# Patient Record
Sex: FEMALE | Race: OTHER | NOT HISPANIC OR LATINO | Employment: PART TIME | URBAN - METROPOLITAN AREA
[De-identification: names, ages, dates, MRNs, and addresses within clinical notes are randomized per-mention and may not be internally consistent; named-entity substitution may affect disease eponyms.]

---

## 2017-02-21 ENCOUNTER — GENERIC CONVERSION - ENCOUNTER (OUTPATIENT)
Dept: OTHER | Facility: OTHER | Age: 42
End: 2017-02-21

## 2017-03-17 ENCOUNTER — ALLSCRIPTS OFFICE VISIT (OUTPATIENT)
Dept: OTHER | Facility: OTHER | Age: 42
End: 2017-03-17

## 2017-04-18 ENCOUNTER — ALLSCRIPTS OFFICE VISIT (OUTPATIENT)
Dept: OTHER | Facility: OTHER | Age: 42
End: 2017-04-18

## 2017-05-10 RX ORDER — FAMOTIDINE 20 MG
1000 TABLET ORAL
COMMUNITY

## 2017-05-14 ENCOUNTER — ANESTHESIA EVENT (OUTPATIENT)
Dept: GASTROENTEROLOGY | Facility: MEDICAL CENTER | Age: 42
End: 2017-05-14
Payer: COMMERCIAL

## 2017-05-15 ENCOUNTER — ANESTHESIA (OUTPATIENT)
Dept: GASTROENTEROLOGY | Facility: MEDICAL CENTER | Age: 42
End: 2017-05-15
Payer: COMMERCIAL

## 2017-05-15 ENCOUNTER — HOSPITAL ENCOUNTER (OUTPATIENT)
Facility: MEDICAL CENTER | Age: 42
Setting detail: OUTPATIENT SURGERY
Discharge: HOME | End: 2017-05-15
Attending: INTERNAL MEDICINE | Admitting: INTERNAL MEDICINE
Payer: COMMERCIAL

## 2017-05-15 ENCOUNTER — GENERIC CONVERSION - ENCOUNTER (OUTPATIENT)
Dept: GASTROENTEROLOGY | Facility: MEDICAL CENTER | Age: 42
End: 2017-05-15

## 2017-05-15 VITALS
HEART RATE: 71 BPM | RESPIRATION RATE: 16 BRPM | WEIGHT: 180 LBS | DIASTOLIC BLOOD PRESSURE: 83 MMHG | BODY MASS INDEX: 28.25 KG/M2 | TEMPERATURE: 96.7 F | SYSTOLIC BLOOD PRESSURE: 119 MMHG | HEIGHT: 67 IN | OXYGEN SATURATION: 100 %

## 2017-05-15 RX ORDER — PROPOFOL 10 MG/ML
INJECTION, EMULSION INTRAVENOUS AS NEEDED
Status: DISCONTINUED | OUTPATIENT
Start: 2017-05-15 | End: 2017-05-15 | Stop reason: SURG

## 2017-05-15 RX ORDER — SODIUM CHLORIDE 9 MG/ML
125 INJECTION, SOLUTION INTRAVENOUS CONTINUOUS
Status: DISCONTINUED | OUTPATIENT
Start: 2017-05-15 | End: 2017-05-15 | Stop reason: HOSPADM

## 2017-05-15 RX ADMIN — LIDOCAINE HYDROCHLORIDE 50 MG: 20 INJECTION, SOLUTION INTRAVENOUS at 09:50

## 2017-05-15 RX ADMIN — PROPOFOL 75 MG: 10 INJECTION, EMULSION INTRAVENOUS at 10:00

## 2017-05-15 RX ADMIN — PROPOFOL 125 MG: 10 INJECTION, EMULSION INTRAVENOUS at 09:49

## 2017-05-15 RX ADMIN — PROPOFOL 50 MG: 10 INJECTION, EMULSION INTRAVENOUS at 10:10

## 2017-05-15 RX ADMIN — PROPOFOL 100 MG: 10 INJECTION, EMULSION INTRAVENOUS at 09:50

## 2017-05-15 RX ADMIN — SODIUM CHLORIDE 125 ML/HR: 0.9 INJECTION, SOLUTION INTRAVENOUS at 09:19

## 2017-06-15 ENCOUNTER — GENERIC CONVERSION - ENCOUNTER (OUTPATIENT)
Dept: OTHER | Facility: OTHER | Age: 42
End: 2017-06-15

## 2017-06-21 ENCOUNTER — GENERIC CONVERSION - ENCOUNTER (OUTPATIENT)
Dept: OTHER | Facility: OTHER | Age: 42
End: 2017-06-21

## 2017-07-17 ENCOUNTER — GENERIC CONVERSION - ENCOUNTER (OUTPATIENT)
Dept: OTHER | Facility: OTHER | Age: 42
End: 2017-07-17

## 2017-07-26 ENCOUNTER — GENERIC CONVERSION - ENCOUNTER (OUTPATIENT)
Dept: OTHER | Facility: OTHER | Age: 42
End: 2017-07-26

## 2017-09-06 ENCOUNTER — GENERIC CONVERSION - ENCOUNTER (OUTPATIENT)
Dept: OTHER | Facility: OTHER | Age: 42
End: 2017-09-06

## 2017-11-01 ENCOUNTER — GENERIC CONVERSION - ENCOUNTER (OUTPATIENT)
Dept: OTHER | Facility: OTHER | Age: 42
End: 2017-11-01

## 2017-11-01 ENCOUNTER — CONVERSION ENCOUNTER (OUTPATIENT)
Dept: MAMMOGRAPHY | Facility: CLINIC | Age: 42
End: 2017-11-01

## 2017-11-20 ENCOUNTER — ALLSCRIPTS OFFICE VISIT (OUTPATIENT)
Dept: OTHER | Facility: OTHER | Age: 42
End: 2017-11-20

## 2017-11-20 DIAGNOSIS — Z12.31 ENCOUNTER FOR SCREENING MAMMOGRAM FOR MALIGNANT NEOPLASM OF BREAST: ICD-10-CM

## 2017-11-21 NOTE — PROGRESS NOTES
Assessment    1  Well female exam with routine gynecological exam (V72 31) (Z01 419)   2  Contraceptive, surveillance, intrauterine device (V25 42) (Z30 431)   3  Visit for screening mammogram (V76 12) (Z12 31)    Plan  Contraceptive, surveillance, intrauterine device    · Follow-up PRN Evaluation and Treatment  Follow-up  Status: Complete  Done:20Nov2017 10:47AM   Ordered; Contraceptive, surveillance, intrauterine device; Ordered By: Obed Beckham Performed:  Due: 65AYY5183  Visit for screening mammogram    · * MAMMO SCREENING BILATERAL W CAD; Status:Hold For - Scheduling; Requestedfor:20Nov2017;    Perform:[facility] Radiology; [phone]; Ordered;for screening mammogram; Ordered By:Demetrius Roland; Well female exam with routine gynecological exam    · Follow-up visit in 1 year Evaluation and Treatment  Follow-up  Status: Hold For -Scheduling  Requested for: 16WLC2428   Ordered; Well female exam with routine gynecological exam; Ordered By: Obed Beckham Performed:  Due: 25BZL1344   · Vitamins can help you get daily requirements that your diet may not be giving you  ;Status:Complete;   Done: 26TOE9925 10:47AM   Ordered;female exam with routine gynecological exam; Ordered By:Demetrius Roland;   · Call [phone] if: You find a new or different kind of lump in your breast ;Status:Complete;   Done: 10RXY1479 10:47AM   Ordered;female exam with routine gynecological exam; Ordered By:Demetrius Roland;   · Call [phone] if: You have any warning signs of skin cancer ; Status:Complete;  Done: 24NXE3066 10:47AM   Ordered;female exam with routine gynecological exam; Ordered By:Demetrius Roland;    Discussion/Summary  health maintenance visit Currently, she eats a healthy diet and has an adequate exercise regimen  next cervical cancer screening is due 2018 Breast cancer screening: monthly self breast exam was advised, mammogram is current and mammogram has been ordered   Colorectal cancer screening: the next colonoscopy is due  Osteoporosis screening: bone mineral density testing is not indicated  Advice and education were given regarding nutrition, aerobic exercise, weight bearing exercise, weight loss, calcium supplements, vitamin D supplements, reproductive health, contraception and cardiovascular risk reduction  The patient has the current Goals: At goal  The patent has the current Barriers: None  Patient is able to Self-Care  Chief Complaint  Annual Gyn Examination      History of Present Illness  GYN HM, Adult Female [place]: The patient is being seen for a health maintenance evaluation  The last health maintenance visit was 1 year(s) ago  General Health: The patient's health since the last visit is described as good  She does not have regular dental visits  -- She complains of vision problems  -- She has hearing loss  Immunizations status: not up to date  Lifestyle:  She consumes a diverse and healthy diet  -- She has weight concerns  Weight control issues: overweight  -- She exercises regularly  She exercises 5 times per week for 60 minutes per session  Exercise includes walking,-- running/jogging-- and-- strength training -- She does not use tobacco -- She denies alcohol use  Reproductive health: the patient is premenopausal--   she reports no menstrual problems  Menstrual history:  age at menarche was 17  LMP: the last menstrual period was 10/24/2017-- and-- the duration was normal  Recent menstrual periods: she has been using 4 pads per 24 hours  The cycles have been regular-- and-- occur approximately every 28 days  The duration of her recent periods has been regular-- and-- usually last 8 days  -- she uses contraception  For contraception, she has an intrauterine device  -- she is sexually active  She is monogamous with a male partner,-- uses safe sex precautions-- and-- one lifetime partner  -- pregnancy history: G 4P 4,-- 4  Screening:   Additional History:  pt reports exercise 5x/week x 60 minreports 2-3 glasses milk daily, 1 serving of cheese daily 1 yogurt daily----1000mg ca++reports occ sbe and denies breast mases or nipple d/cfirst mammogram 11/1/2017--wnlpap 2015--wnl, repeat 2018    Review of Systems   Constitutional: No fever, no chills, feels well, no tiredness, no recent weight gain or loss-- and-- as noted in HPI   ENT: no ear ache, no loss of hearing, no nosebleeds or nasal discharge, no sore throat or hoarseness  Cardiovascular: no complaints of slow or fast heart rate, no chest pain, no palpitations, no leg claudication or lower extremity edema  Respiratory: no complaints of shortness of breath, no wheezing, no dyspnea on exertion, no orthopnea or PND  Breasts: no complaints of breast pain, breast lump or nipple discharge  Gastrointestinal: no complaints of abdominal pain, no constipation, no nausea or diarrhea, no vomiting, no bloody stools  Genitourinary: no complaints of dysuria, no incontinence, no pelvic pain, no dysmenorrhea, no vaginal discharge or abnormal vaginal bleeding  Musculoskeletal: no complaints of arthralgia, no myalgia, no joint swelling or stiffness, no limb pain or swelling  Integumentary: no complaints of skin rash or lesion, no itching or dry skin, no skin wounds  Neurological: no complaints of headache, no confusion, no numbness or tingling, no dizziness or fainting  Active Problems  1  Contraceptive, surveillance, intrauterine device (V25 42) (Z30 431)   2  Dietary calcium deficiency (269 3) (E58)   3  History of diverticulitis of colon (V12 79) (Z87 19)   4  History of Lack of exercise (V69 0) (Z72 3)   5  Low HDL (under 40) (272 5) (E78 6)   6   Well female exam with routine gynecological exam (V72 31) (Z01 419)    Past Medical History     · History of Chickenpox (052 9) (B01 9)   · History of Chronic folliculitis (918 7) (R63 3)   · History of Complication of intrauterine device (IUD), unspecified complication, initialencounter (996 76) (T83 9XXA)   · Denied: History of Genetic screening   · History of Gestational diabetes (648 80) (O24 419)   · Denied: History of Herpes simplex   · Denied: History of abnormal cervical Pap smear   · History of pregnancy (V13 29)   · History of Pap smear for cervical cancer screening (V76 2) (Z12 4)   · History of Vitamin D deficiency (268 9) (E55 9)    The active problems and past medical history were reviewed and updated today  Surgical History   · History of  Section   · History of Oral Surgery Tooth Extraction    The surgical history was reviewed and updated today  Family History  Mother    · Family history of uterine cancer (V16 49) (Z80 51)  Father    · No pertinent family history  Sister    · Family history of uterine fibroid (V18 7) (Z87 898)  Maternal Grandmother    · Family history of Brain cancer  Paternal Grandmother    · Family history of Hypertension  Maternal Aunt    · Family history of breast cancer (V16 3) (Z80 3)    The family history was reviewed and updated today  Social History     ·  ancestry   · Exercise habits   · active but no formal exercise   · Foods   · NO PORK! · High school graduate   · Denied: History of prescription drug abuse   · Housewife or homemaker   · Worship (Pentecostal) Araceli   · History of Lack of exercise (V69 0) (Z72 3)   ·    · Never a smoker   · No alcohol use   · No drug use  The social history was reviewed and updated today  The social history was reviewed and is unchanged  Current Meds   1  Paragard Intrauterine Copper Intrauterine Intrauterine Device; Therapy: (Recorded:2017) to Recorded   2  Vitamin D 1000 UNIT CAPS; Therapy: (Recorded:2016) to Recorded    Allergies  1   No Known Drug Allergies    Vitals   Recorded: 45IHX8021 70:81AD   Systolic 870, LUE, Sitting   Diastolic 70, LUE, Sitting   Height 5 ft 4 5 in   Weight 174 lb    BMI Calculated 29 41   BSA Calculated 1 85   LMP 2017       Physical Exam   Constitutional  General appearance: No acute distress, well appearing and well nourished  overweight  Neck  Neck: Normal, supple, trachea midline, no masses  Thyroid: Normal, no thyromegaly  Pulmonary  Respiratory effort: No increased work of breathing or signs of respiratory distress  Auscultation of lungs: Clear to auscultation  Cardiovascular  Auscultation of heart: Normal rate and rhythm, normal S1 and S2, no murmurs  Peripheral vascular exam: Normal pulses Throughout  Genitourinary  External genitalia: Normal and no lesions appreciated  Vagina: Normal, no lesions or dryness appreciated  Vagina: normal-- and-- no discharge  Urethra: Normal    Urethral meatus: Normal    Bladder: Normal, soft, non-tender and no prolapse or masses appreciated  Cervix: Normal, no palpable masses  Examination of the cervix revealed 2 cm, but-- normal findings,-- no polyps-- and-- no masses  A Pap smear was not performed  An IUD string  Uterus: Normal, non-tender, not enlarged, and no palpable masses  Adnexa/parametria: Normal, non-tender and no fullness or masses appreciated  Chest  Breasts: Normal and no dimpling or skin changes noted  normal appearance  Examination of the nipples revealed normal appearance-- and-- no discharge  Right Breast:  No masses palpated  Left Breast: No masses palpated  Normal axillary node palpated on the right  Normal axillary node palpated on the left  Abdomen  Abdomen: Normal, non-tender, and no organomegaly noted  Liver and spleen: No hepatomegaly or splenomegaly  Examination for hernias: No hernias appreciated  Lymphatic  Palpation of lymph nodes in neck, axillae, groin and/or other locations: No lymphadenopathy or masses noted  Skin  Skin and subcutaneous tissue: Normal skin turgor and no rashes     Psychiatric  Orientation to person, place, and time: Normal    Mood and affect: Normal        Signatures   Electronically signed by : RAMAN Alvarez ; Nov 20 2017 10:55AM EST (Author)

## 2018-01-09 NOTE — PROGRESS NOTES
Patient Health Assessment    Date:            06/15/2017  Blood Pressure:  117/72  Pulse:           76  Age:             42  Weight:          171 lbs  Height/Length:   5' 8"  Body Mass Index: 26 0  Provider:        [id number]  Clinic:          [facility]      comp exam and fmx  Medical Alert: none  Medications: VIT D2 1 25 MG (50,000 UNIT)  Allergies:      other  Since Last Visit: Medical Alert: No Change    Medications: Change    Allergies:        No Change  Pain Scale Type: Numeric Pain ScalePain Level: 0  Description: pt reports last cleaning 1-2 + years ago  pt reports a lot of  dental problems  initial charting   pt requested Dr Carrington Patterson pt speaks English and Japanese  Dr Oscar Rachel exam=dr whitney youssef plan pt case    ----- Signed on Thursday, Naomi 15, 2017 at 1:03:33 PM  -----  ----- Provider: [id number] - Declan Fabian RD -- Clinic: [place] -----

## 2018-01-10 NOTE — PROGRESS NOTES
Assessment    1  Chronic folliculitis (759 7) (B69 5)    Plan  Chronic folliculitis    · Follow-up PRN Evaluation and Treatment  Follow-up  Status: Complete  Done:  29CEC3072 04:37PM   Ordered; For: Chronic folliculitis; Ordered By: Benjamín Altamirano Performed:  Due: 53RIV2858    Discussion/Summary  Discussion Summary:   Breast awareness discussed  History of Present Illness  HPI: 48 yo  c LMP 12/30/15 using Paragard IUD for [facility] presents c/o change in color of breast inferiorly on the left  Pt first noticed this one week ago  The size has not changed since then  The area is not tender  Pt denies nipple discharge, F/C/N/V  Weight has been stable and appetite normal  The area is 1 mm diameter with brown discoloration  Pt has used nothing for it  Review of Systems   Female ROS: no pelvic pain and no dysuria  Focused-Female:   Constitutional: as noted in HPI  Cardiovascular: the heart rate was not fast    Respiratory: no shortness of breath  Breasts: as noted in HPI  Gastrointestinal: no complaints of abdominal pain, no constipation, no nausea or diarrhea, no vomiting, no bloody stools  Genitourinary: no complaints of dysuria, no incontinence, no pelvic pain, no dysmenorrhea, no vaginal discharge or abnormal vaginal bleeding  Musculoskeletal: no complaints of arthralgia, no myalgia, no joint swelling or stiffness, no limb pain or swelling  Integumentary: no complaints of skin rash or lesion, no itching or dry skin, no skin wounds  Neurological: no complaints of headache, no confusion, no numbness or tingling, no dizziness or fainting  Active Problems    1  Contraceptive, surveillance, intrauterine device (V25 42) (Z30 431)   2  Dietary calcium deficiency (269 3) (E58)   3  Lack of exercise (V69 0) (Z72 3)   4  Pap smear for cervical cancer screening (V76 2) (Z12 4)   5  Well female exam with routine gynecological exam (V72 31) (Z01 419)    Past Medical History    1   History of Chickenpox (052 9) (B01 9)   2  Denied: History of Genetic screening   3  History of Gestational diabetes (648 80) (O24 419)   4  Denied: History of Herpes simplex   5  Denied: History of abnormal cervical Pap smear   6  History of pregnancy (V13 29)  Active Problems And Past Medical History Reviewed: The active problems and past medical history were reviewed and updated today  Surgical History    1  History of  Section   2  History of Oral Surgery Tooth Extraction  Surgical History Reviewed: The surgical history was reviewed and updated today  Family History    1  No pertinent family history    2  No pertinent family history    3  Family history of Brain cancer    4  Family history of Hypertension  Family History Reviewed: The family history was reviewed and updated today  Social History    ·  ancestry   · Exercise habits   · Foods   · High school graduate   · Denied: History of prescription drug abuse   · Housewife or homemaker   · Jewish (Islam) Araceli   · Lack of exercise (V69 0) (Z72 3)   ·    · Never a smoker   · No alcohol use   · No drug use  Social History Reviewed: The social history was reviewed and updated today  Current Meds   1  Paragard Intrauterine Copper Intrauterine Intrauterine Device; ParaGard T 380A   Intrauterine IUD: place 1 device by intrauterine route once; Therapy: 70HSJ8858 to Recorded  Medication List Reviewed: The medication list was reviewed and updated today  Allergies    1  No Known Drug Allergies    Vitals  Vital Signs [Data Includes: Current Encounter]    Recorded: 97PPQ7943 46:68XR   Systolic 016   Diastolic 70   Height 5 ft 4 5 in   Weight 144 lb    BMI Calculated 24 34   BSA Calculated 1 71   LMP 59UCT3145     Physical Exam    Constitutional   General appearance: No acute distress, well appearing and well nourished  Neck   Neck: Normal, supple, trachea midline, no masses  - nodes     Pulmonary   Respiratory effort: No increased work of breathing or signs of respiratory distress  Chest 1 mm hyperpigmented follicle 3588 3 cm from areola of left breast  No hyperemia or fluctuance  NO breast masses or tenderness noted  Lymphatic neck and axillary nodes wnl  Skin   Skin and subcutaneous tissue: Normal skin turgor and no rashes      Psychiatric   Orientation to person, place, and time: Normal     Mood and affect: Normal        Signatures   Electronically signed by : RAMAN Roberts ; Jan 18 2016  4:49PM EST                       (Author)

## 2018-01-10 NOTE — PROGRESS NOTES
Patient Health Assessment    Date:            07/26/2017  Blood Pressure:  112/57  Pulse:           78  Age:             42  Weight:          171 lbs  Height/Length:   5' 8"  Body Mass Index: 26 0  Provider:        LANDY  Clinic:          [facility]  Medications: Augmentin 500-125 MG    VIT D2 1 25 MG (50,000 UNIT)    BETAMETH DIP CRE 0 05% 0 05    CIPROFLOXACN TAB 500MG 500    METRONIDAZOLE 500 MG TABLET 500 MG    SUPREP BOWEL PREP KIT  Allergies:      other  Since Last Visit: Medical Alert: No Change    Medications: No Change    Allergies:        No Change  Pain Scale Type: Numeric Pain ScalePain Level: 0  Description:      Patient presents 20 mins late for ext #14  Reviewed PMH, patient denies any changes  Explained benefits and risks of  procedure to pt, pt understands and consents to treatment  Signed and  uploaded consent form to doc center  Applied topical benzocaine, administered  carps of 2% Lidocaine w/ 1:100,000  via infiltartion Buccal and palatal,  carps 4% articaine 1:100,000 epi via  local infiltration  Gingiva elevated with #9 periosteal elevator, tooth #14  luxated with straight elevators and removed with forceps  Socket curetted  Placed gauze and provided verbal and written POI  Instructed pt to take OTC  pain meds PRN pain  Pt tolerated procedure well, left satisfied and ambulatory  Removed clasps on the existing paial to minimize tissue injury      NV: ext #18  MQ    ----- Signed on Wednesday, July 26, 2017 at 10:31:29 AM  -----  ----- Provider: LANDY Pichardo DDS -- Clinic: [facility] -----

## 2018-01-11 NOTE — PROGRESS NOTES
Patient has a chronic infection on non RCT tooth #18 with a post in the  distal canal  Possible sargenti tech used in [facility]  Patient says that occasionally her lymph nodes would swell up on the left  side only with pain  #18 has a large PAP  presented a limited treatment plan to address phase I  infection control  NV  section lower bridge remove crown on # 18 and remove the post start RCT  on #18        ----- Signed on Thursday, Naomi 15, 2017 at 1:13:09 PM  -----  ----- Provider: [id number] Maurice Vazquez DDS -- Clinic: [facility] -----

## 2018-01-12 VITALS
HEIGHT: 65 IN | BODY MASS INDEX: 30.16 KG/M2 | TEMPERATURE: 97.6 F | OXYGEN SATURATION: 97 % | HEART RATE: 72 BPM | WEIGHT: 181 LBS | DIASTOLIC BLOOD PRESSURE: 60 MMHG | SYSTOLIC BLOOD PRESSURE: 110 MMHG

## 2018-01-13 VITALS
HEIGHT: 65 IN | SYSTOLIC BLOOD PRESSURE: 122 MMHG | DIASTOLIC BLOOD PRESSURE: 70 MMHG | BODY MASS INDEX: 28.99 KG/M2 | WEIGHT: 174 LBS

## 2018-01-13 NOTE — PROGRESS NOTES
Pt presents for stage II  endo #18  Patient is complaining that #14 broke off  and it is bothering her  PMH reviewed, no changes  Applied topical  benzocaine, administered  carps 2% lido 1:100k epi via  and  carps 4% articaine   1:100k epi via   Caries removed on #14 , very deep mesially with gingival  overgrowth  Tooth is nonrestorable due to the extent of the decay  Patient  opted withextractions and  implant replacment  Also discussed the resorability of #18  #18 has no opposing dentitions  NV: Ext # 18 and 14      /MQ    ----- Signed on Monday, July 17, 2017 at 12:02:34 PM  -----  ----- Provider: [id number] Prakash Paredes DDS -- Clinic: [facility] -----

## 2018-01-15 NOTE — PROGRESS NOTES
Patient Health Assessment    Date:            09/06/2017  Blood Pressure:  107/59  Pulse:           67  Age:             42  Weight:          177 lbs  Height/Length:   5' 8"  Body Mass Index: 26 8  Provider:        LANDY  [facility]  Medications: Augmentin 500-125 MG    VIT D2 1 25 MG (50,000 UNIT)    BETAMETH DIP CRE 0 05% 0 05    CIPROFLOXACN TAB 500MG 500    METRONIDAZOLE 500 MG TABLET 500 MG    SUPREP BOWEL PREP KIT  Allergies:      other  Since Last Visit: Medical Alert: No Change    Medications: No Change    Allergies:        No Change  Pain Scale Type: Numeric Pain ScalePain Level: 0  Description:  Patient presents for ext #18  Reviewed PMH, patient denies any changes  Explained benefits and risks of  procedure to pt, pt understands and consents to treatment  Signed and  uploaded consent form to doc center  Applied topical benzocaine, administered  carps of 2% Lidocaine w/ 1:100,000  via RON block,  carps 4% articaine 1:100,000 epi via local infiltration  Gingiva elevated with #9 periosteal elevator, tooth # luxated with straight  elevators and removed with forceps     Placed gauze and provided verbal and  written POI  Instructed pt to take 600mg ibuprofen and 500mg tylenol PRN  pain  Pt tolerated procedure well, left satisfied and ambulatory  NV: Implant placement on the upper      MQ    ----- Signed on Wednesday, September 06, 2017 at 10:22:21 AM  -----  ----- Provider: LANDY Paul DDS -- [facility]: [facility] -----

## 2018-01-17 NOTE — PROGRESS NOTES
Patient was originally scheduled for alginates  She is complaining about #18  Today;  Gave 2 carp 2%lido with 1/100k epi  Sectioned the the bridge just distal to the pontic  Sectioned crown on #18   crown removed  removed recurrent decay  and used cavitron to loosen up  screwed post  Removed post and debride all canals, used the remaining tooth  structures as reference points  we need to recheck length at next visit  and  placed Ca(OH)2 and cotton pellet  and IRM  To minimize the risk of flare up  antibiotic we RX   NV  Instrumentation        ----- Signed on Wednesday, June 21, 2017 at 1:00:38 PM  -----  ----- Provider: [id number] Geovanny Copeland DDS -- Clinic: [facility] -----

## 2018-04-28 ENCOUNTER — HOSPITAL ENCOUNTER (OUTPATIENT)
Dept: RADIOLOGY | Facility: HOSPITAL | Age: 43
Discharge: HOME | End: 2018-04-28
Payer: COMMERCIAL

## 2018-04-28 ENCOUNTER — OFFICE VISIT (OUTPATIENT)
Dept: OBGYN CLINIC | Facility: HOSPITAL | Age: 43
End: 2018-04-28
Payer: COMMERCIAL

## 2018-04-28 VITALS
DIASTOLIC BLOOD PRESSURE: 76 MMHG | HEIGHT: 67 IN | WEIGHT: 171 LBS | BODY MASS INDEX: 26.84 KG/M2 | HEART RATE: 78 BPM | SYSTOLIC BLOOD PRESSURE: 128 MMHG

## 2018-04-28 DIAGNOSIS — M17.11 PRIMARY OSTEOARTHRITIS OF RIGHT KNEE: ICD-10-CM

## 2018-04-28 DIAGNOSIS — M25.561 ACUTE PAIN OF RIGHT KNEE: ICD-10-CM

## 2018-04-28 DIAGNOSIS — M25.561 ACUTE PAIN OF RIGHT KNEE: Primary | ICD-10-CM

## 2018-04-28 PROCEDURE — 73562 X-RAY EXAM OF KNEE 3: CPT

## 2018-04-28 PROCEDURE — 99203 OFFICE O/P NEW LOW 30 MIN: CPT | Performed by: PHYSICIAN ASSISTANT

## 2018-04-28 RX ORDER — CELECOXIB 200 MG/1
200 CAPSULE ORAL DAILY
Qty: 30 CAPSULE | Refills: 0 | Status: SHIPPED | OUTPATIENT
Start: 2018-04-28 | End: 2018-11-01 | Stop reason: ALTCHOICE

## 2018-04-28 RX ORDER — COPPER 313.4 MG/1
INTRAUTERINE DEVICE INTRAUTERINE
COMMUNITY
End: 2020-08-17

## 2018-04-28 NOTE — PROGRESS NOTES
Assessment/Plan   Diagnoses and all orders for this visit:    Acute pain of right knee  -     XR knee 3 vw right non injury; Future  -     celecoxib (CeleBREX) 200 mg capsule; Take 1 capsule (200 mg total) by mouth daily  -     Ambulatory referral to Physical Therapy; Future    Primary osteoarthritis of right knee  -     celecoxib (CeleBREX) 200 mg capsule; Take 1 capsule (200 mg total) by mouth daily  -     Ambulatory referral to Physical Therapy; Future    Other orders  -     PARAGARD INTRAUTERINE COPPER IUD; by Intrauterine route          Subjective   Patient ID: Rk Moon is a 41 y o  female  Vitals:    18 1113   BP: 128/76   Pulse: 66     44yo female comes in for an evaluation of her right knee  She has been having pain for approximately 2 years and has been diagnosed with arthritis  The pain is anterior and increases most when she is dancing  The dull, mild, pain does not radiate and is not associated with numbness  The following portions of the patient's history were reviewed and updated as appropriate: allergies, current medications, past family history, past medical history, past social history, past surgical history and problem list     Review of Systems  Ortho Exam  Past Medical History:   Diagnosis Date    Diverticulitis      Past Surgical History:   Procedure Laterality Date     SECTION      x4    [place] COLONOSCOPY FLX DX W/COLLJ SPEC WHEN PFRMD N/A 5/15/2017    Procedure: COLONOSCOPY;  Surgeon: Elias Gallego MD;  Location: [facility]; Service: Gastroenterology     History reviewed  No pertinent family history  Social History     Occupational History    Not on file  Social History Main Topics    Smoking status: Former Smoker    Smokeless tobacco: Former User     Quit date: 5/15/2016    Alcohol use No    Drug use: No    Sexual activity: Not on file       Review of Systems   Constitutional: Negative  HENT: Negative  Eyes: Negative      Respiratory: Negative  Cardiovascular: Negative  Gastrointestinal: Negative  Endocrine: Negative  Genitourinary: Negative  Musculoskeletal: As below      Allergic/Immunologic: Negative  Neurological: Negative  Hematological: Negative  Psychiatric/Behavioral: Negative  Objective   Physical Exam    · Constitutional: Awake, Alert, Oriented  · Eyes: EOMI  · Psych: Mood and affect appropriate  · Heart: regular rate and rhythm  · Lungs: No audible wheezing  · Abdomen: soft  · Lymph: no lymphedema   right Knee:  - Appearance   No swelling, discoloration, deformity, or ecchymosis  - Effusion   none  - Palpation   No tenderness about the medial / lateral joint lines, patella, patellar tendon, MCL, LCL, hamstrings, or medial / lateral plateaus   - ROM   Extension: 0 and Flexion: 140  - Special Tests   Sima's Test negative, Lachman's Test negative, Anterior Drawer Test negative, Posterior Drawer Test negative, Valgus Stress Test negative, Varus Stress Test negative and Patellar apprehension negative  - Motor   normal 5/5 in all planes  - NVI distally    I have personally reviewed pertinent films in PACS and my interpretation is bone spur formation of the patella, anterior compartment arthritis

## 2018-06-05 ENCOUNTER — TELEPHONE (OUTPATIENT)
Dept: OBGYN CLINIC | Facility: CLINIC | Age: 43
End: 2018-06-05

## 2018-06-05 NOTE — TELEPHONE ENCOUNTER
Patient called stating she has the [place] IUD and has been experiencing heavy bleeding and clots  She is changing her pad every 4 hours Per Dr Janell Mathews having the Paraguard could make her periods heavier, if she would like an appointment patient may schedule  Patient aware and will call back if worsens

## 2018-10-29 RX ORDER — ATENOLOL 25 MG/1
25 TABLET ORAL DAILY
Refills: 0 | COMMUNITY
Start: 2018-09-27 | End: 2018-11-01 | Stop reason: SDUPTHER

## 2018-11-01 ENCOUNTER — OFFICE VISIT (OUTPATIENT)
Dept: CARDIOLOGY CLINIC | Facility: CLINIC | Age: 43
End: 2018-11-01
Payer: COMMERCIAL

## 2018-11-01 VITALS
DIASTOLIC BLOOD PRESSURE: 70 MMHG | BODY MASS INDEX: 25.24 KG/M2 | OXYGEN SATURATION: 98 % | SYSTOLIC BLOOD PRESSURE: 108 MMHG | HEIGHT: 67 IN | HEART RATE: 81 BPM | WEIGHT: 160.8 LBS

## 2018-11-01 DIAGNOSIS — R00.2 PALPITATIONS: Primary | ICD-10-CM

## 2018-11-01 DIAGNOSIS — I49.3 PVC'S (PREMATURE VENTRICULAR CONTRACTIONS): ICD-10-CM

## 2018-11-01 PROCEDURE — 93000 ELECTROCARDIOGRAM COMPLETE: CPT | Performed by: INTERNAL MEDICINE

## 2018-11-01 PROCEDURE — 99244 OFF/OP CNSLTJ NEW/EST MOD 40: CPT | Performed by: INTERNAL MEDICINE

## 2018-11-01 RX ORDER — ATENOLOL 25 MG/1
25 TABLET ORAL DAILY
Qty: 90 TABLET | Refills: 3 | Status: SHIPPED | OUTPATIENT
Start: 2018-11-01 | End: 2019-02-04 | Stop reason: SDUPTHER

## 2018-11-01 RX ORDER — DIPHENOXYLATE HYDROCHLORIDE AND ATROPINE SULFATE 2.5; .025 MG/1; MG/1
1 TABLET ORAL
COMMUNITY

## 2018-11-01 NOTE — LETTER
November 1, 2018     Marilu Zepeda MD  [facility]    Patient: Tapan Gutiérrez   YOB: 1975   Date of Visit: 11/1/2018       Dear Dr Saba Public: Thank you for referring [employer] to me for evaluation  Below are my notes for this consultation  If you have questions, please do not hesitate to call me  I look forward to following your patient along with you  Sincerely,        Jose Antonio Logan MD        CC: No Recipients  Jose Antonio Logan MD  11/1/2018  2:39 PM  Sign at close encounter   [address]    [address], [address]  [address]  [address]  Phone#  [phone]  Fax#  [phone]  *-*-*-*-*-*-*-*-*-*-*-*-*-*-*-*-*-*-*-*-*-*-*-*-*-*-*-*-*-*-*-*-*-*-*-*-*-*-*-*-*-*-*-*-*-*-*-*-*-*-*-*-*-*    ENCOUNTER DATE: 11/01/18 2:38 PM  PATIENT NAME: Tapan Gutiérrez   1975    [id number]  Age: 41 y o  Sex: female  AUTHOR: Nataliia Hemphill MD  PRIMARYCARE PHYSICIAN: Marilu Zepeda MD    *-*-*-*-*-*-*-*-*-*-*-*-*-*-*-*-*-*-*-*-*-*-*-*-*-*-*-*-*-*-*-*-*-*-*-*-*-*-*-*-*-*-*-*-*-*-*-*-*-*-*-*-*-*-  REASON FOR REFERRAL:   Evaluation of PVCs    *-*-*-*-*-*-*-*-*-*-*-*-*-*-*-*-*-*-*-*-*-*-*-*-*-*-*-*-*-*-*-*-*-*-*-*-*-*-*-*-*-*-*-*-*-*-*-*-*-*-*-*-*-*-  CARDIOLOGY ASSESSMENT & PLAN:  Palpitations   41 year young female with the palpitations which seems to be more often occurring with emotional stress  Event monitoring suggesting of isolated premature ventricular contractions  Has found significant improvement with addition of beta-blocker  There is no recent decline in exercise tolerance  Current ECG is unremarkable for evidence of prior infarction  No family history of premature CAD or sudden cardiac death   Laboratory evaluation including thyroid function test is normal      Evaluation is consistent with probably benign PVCs, possibly precipitated in emotionally distressed state     -   Recommend exercise treadmill stress test and a 2D echocardiogram for further evaluation  If her left ventricular function is normal and she has no exercised provoked increasing PVCs or nonsustained or sustained arrhythmias then no further workup is necessary at this time  She would need periodic follow-up to assess the burden  in the future  -  We will continue her current beta-blocker medication atenolol  She is made awareMedication is associated with intrauterine growth  Retardation in pregnant women and has pregnancy category C   Status  She acknowledges that she cannot be pregnant at this time as she uses an IUD and she does not intend to become pregnant at this time  *-*-*-*-*-*-*-*-*-*-*-*-*-*-*-*-*-*-*-*-*-*-*-*-*-*-*-*-*-*-*-*-*-*-*-*-*-*-*-*-*-*-*-*-*-*-*-*-*-*-*-*-*-*-  CURRENT ECG:  Results for orders placed or performed in visit on 18   POCT ECG    Narrative    Sinus rhythm, HR 81 beats per min, left axis deviation, normal intervals, delayed R-wave transition, no significant ST-T abnormalities  No PVCs were noted on  this EKG        *-*-*-*-*-*-*-*-*-*-*-*-*-*-*-*-*-*-*-*-*-*-*-*-*-*-*-*-*-*-*-*-*-*-*-*-*-*-*-*-*-*-*-*-*-*-*-*-*-*-*-*-*-*-  HISTORY OF PRESENT ILLNESS:   patient is a 41 year female of [place] origin has been referred for evaluation of palpitations since 2018  She has no significant past medical history except for history of diverticulitis, history of  and history of intrauterine device implantation  She reports that since  2018  She has been experiencing on and off palpitation episodes  She reports that symptoms started a day after she had dental implants  She felt them like strong extra heart beats interspersed with normal heart beats  She was evaluated at [facility] on 2018 and was noted to have sinus rhythm with PVCs  Her blood work was overall unremarkable and subsequently she was discharged home    She followed up with her primary care physician and she was put on an event monitor between 2018 and 2018  This was significant for predominant sinus rhythm with occasional occurrence of PVCs in bigeminy pattern  The total burden of PVCs is not reported         She reports that since the event she went to the emergency room on an of she continues to feel the palpitations  The symptoms where worse in the month of 2018  She describes that the symptoms are often  Precipitated when she is in an emotionally stressed situation or when she is upset  Occasionally she also feels these after having a heavy meal   Symptoms are felt as is extra heartbeats rather than sustained palpitations  Events occur almost every day  These are not associated with diaphoresis, presyncope / syncope or typical chest pain  Symptoms often occur when she is active rather than when she is at rest  She does report that she feels very tired when she gets the PVCs  There is no relationship of symptoms with it with her menstrual cycle  In 2018 after her event monitor her primary care physician started her on atenolol therapy and since then she feels much better with reduction in frequency of symptoms  Now she rarely feels these and these are not its as prolonged as before  She denies any recent decline in her exercise tolerance   And also denies any typical anginal like symptoms  No orthopnea, PND or pedal edema either  *-*-*-*-*-*-*-*-*-*-*-*-*-*-*-*-*-*-*-*-*-*-*-*-*-*-*-*-*-*-*-*-*-*-*-*-*-*-*-*-*-*-*-*-*-*-*-*-*-*-*-*-*-*  PAST MEDICAL HISTORY:   Past Medical History:   Diagnosis Date    Diverticulitis        PAST SURGICAL HISTORY:   Past Surgical History:   Procedure Laterality Date     SECTION      x4    LA COLONOSCOPY FLX DX W/COLLJ SPEC WHEN PFRMD N/A 5/15/2017    Procedure: COLONOSCOPY;  Surgeon: Vita Lopez MD;  Location: 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  *-*-*-*-*-*-*-*-*-*-*-*-*-*-*-*-*-*-*-*-*-*-*-*-*-*-*-*-*-*-*-*-*-*-*-*-*-*-*-*-*-*-*-*-*-*-*-*-*-*-*-*-*-*  ALLERGIES:  No Known Allergies  *-*-*-*-*-*-*-*-*-*-*-*-*-*-*-*-*-*-*-*-*-*-*-*-*-*-*-*-*-*-*-*-*-*-*-*-*-*-*-*-*-*-*-*-*-*-*-*-*-*-*-*-*-*  CURRENT OUTPATIENT MEDICATIONS:     Current Outpatient Prescriptions:     atenolol (TENORMIN) 25 mg tablet, Take 25 mg by mouth daily, Disp: , Rfl: 0    multivitamin (THERAGRAN) TABS, Take 1 tablet by mouth, Disp: , Rfl:     PARAGARD INTRAUTERINE COPPER IUD, by Intrauterine route, Disp: , Rfl:     Vitamin D, Cholecalciferol, 1000 units CAPS, Take 1,000 Units by mouth, Disp: , Rfl:     *-*-*-*-*-*-*-*-*-*-*-*-*-*-*-*-*-*-*-*-*-*-*-*-*-*-*-*-*-*-*-*-*-*-*-*-*-*-*-*-*-*-*-*-*-*-*-*-*-*-*-*-*-*  REVIEW OF SYMPTOMS:    Positive for:   Palpitations, fatigue  Negative for: All remaining as reviewed below and in HPI  SYSTEM SYMPTOMS REVIEWED:  Generalweight change, fever, night sweats  Respirato      Cardiovascularchest pain, syncope, dyspnea on exertion, edema, decline in exercise tolerance, claudication   Gastrointestinalpersistent vomiting, diarrhea, abdominal distention, blood in stool   Muscular or skeletaljoint pain or swelling   Neurologicheadaches, syncope, abnormal movement  Hematologichistory of easy bruising and bleeding   Endocrinethyroid enlargement, heat or cold intolerance, polyuria   Psychiatricanxiety, depression     *-*-*-*-*-*-*-*-*-*-*-*-*-*-*-*-*-*-*-*-*-*-*-*-*-*-*-*-*-*-*-*-*-*-*-*-*-*-*-*-*-*-*-*-*-*-*-*-*-*-*-*-*-*-  VITAL SIGNS:  Vitals:    11/01/18 1347   BP: 108/70   Pulse: 81   SpO2: 98%   Weight: 72 9 kg (160 lb 12 8 oz)   Height: 5' 7" (1 702 m)       *-*-*-*-*-*-*-*-*-*-*-*-*-*-*-*-*-*-*-*-*-*-*-*-*-*-*-*-*-*-*-*-*-*-*-*-*-*-*-*-*-*-*-*-*-*-*-*-*-*-*-*-*-*-  PHYSICAL EXAM:  General Appearance:    Alert, cooperative, no distress, appears stated age   Head, Eyes, ENT:    No obvious abnormality, moist mucous mebranes  Neck:   Supple, no carotid bruit or JVD   Back:     Symmetric, no curvature  Lungs:     Respirations unlabored  Clear to auscultation bilaterally,    Chest wall:    No tenderness or deformity   Heart:    Regular rate and rhythm, S1 and S2 normal, no murmur, rub  or gallop  Abdomen:     Soft, non-tender, No obvious masses, or organomegaly   Extremities:   Extremities normal, no cyanosis or edema    Skin:   Skin color, texture, turgor normal, no rashes or lesions     *-*-*-*-*-*-*-*-*-*-*-*-*-*-*-*-*-*-*-*-*-*-*-*-*-*-*-*-*-*-*-*-*-*-*-*-*-*-*-*-*-*-*-*-*-*-*-*-*-*-*-*-*-*-  LABORATORY DATA:  I have personally reviewed pertinent labs  Recent blood work performed on October 20, 2018 indicates normal renal function and normal LFTs, normal hemoglobin and hematocrit, normal thyroid function tests, borderline decreased calcium at 8 0 3, low vitamin-D levels  No results found for: NA, K, CL, CO2, ANIONGAP, BUN, CREATININE, EGFR, GLUCOSE, CALCIUM, AST, ALT, ALKPHOS, PROT, BILITOT, MG  No results found for: WBC, HGB, PLT  No results found for: PT, PTT, INR  No results found for: CKMB, BNP, DIGOXIN  No results found for: TSH  No results found for: CHOL, HDL, LDL, TRIG   No results found for: HGBA1C  No results found for: BLOODCX, SPUTUMCULTUR, GRAMSTAIN, URINECX, WOUNDCULT, BODYFLUIDCUL, MRSACULTURE, INFLUAPCR, INFLUBPCR, RSVPCR, LEGIONELLAUR, CDIFFTOXINB    *-*-*-*-*-*-*-*-*-*-*-*-*-*-*-*-*-*-*-*-*-*-*-*-*-*-*-*-*-*-*-*-*-*-*-*-*-*-*-*-*-*-*-*-*-*-*-*-*-*-*-*-*-*-  RADIOLOGY RESULTS:  Xr Knee 3 Vw Right Non Injury    Result Date: 5/4/2018  Impression: Mild osteoarthritis   Workstation performed: [id number]       *-*-*-*-*-*-*-*-*-*-*-*-*-*-*-*-*-*-*-*-*-*-*-*-*-*-*-*-*-*-*-*-*-*-*-*-*-*-*-*-*-*-*-*-*-*-*-*-*-*-*-*-*-*-  ECHOCARDIOGRAM AND OTHER CARDIOLOGY RESULTS:  No results found for this or any previous visit  No results found for this or any previous visit  No results found for this or any previous visit  No results found for this or any previous visit       *-*-*-*-*-*-*-*-*-*-*-*-*-*-*-*-*-*-*-*-*-*-*-*-*-*-*-*-*-*-*-*-*-*-*-*-*-*-*-*-*-*-*-*-*-*-*-*-*-*-*-*-*-*-  SIGNATURES:   @[facility]@   Guido Mir MD     CC:   MD Rocky Ding MD

## 2018-11-01 NOTE — PATIENT INSTRUCTIONS
CARDIOLOGY ASSESSMENT & PLAN:  Palpitations   41 year young female with the palpitations which seems to be more often occurring with emotional stress  Event monitoring suggesting of isolated premature ventricular contractions  Has found significant improvement with addition of beta-blocker  There is no recent decline in exercise tolerance  Current ECG is unremarkable for evidence of prior infarction  No family history of premature CAD or sudden cardiac death  Laboratory evaluation including thyroid function test is normal      Evaluation is consistent with probably benign PVCs, possibly precipitated in emotionally distressed state     -   Recommend exercise treadmill stress test and a 2D echocardiogram for further evaluation  If her left ventricular function is normal and she has no exercised provoked increasing PVCs or nonsustained or sustained arrhythmias then no further workup is necessary at this time  She would need periodic follow-up to assess the burden  in the future  -  We will continue her current beta-blocker medication atenolol  She is made awareMedication is associated with intrauterine growth  Retardation in pregnant women and has pregnancy category C   Status  She acknowledges that she cannot be pregnant at this time as she uses an IUD and she does not intend to become pregnant at this time

## 2018-11-01 NOTE — LETTER
November 1, 2018     Flakita Watson MD  [facility]    Patient: Max Mcmullen   YOB: 1975   Date of Visit: 11/1/2018       Dear Dr Ruth Gibson: [facility]  Below are my notes for this consultation  If you have questions, please do not hesitate to call me  I look forward to following your patient along with you  Sincerely,        Jose Antonio Logan MD        CC: No Recipients  Jose Antonio Logan MD  11/1/2018  2:33 PM  [facility]   [facility]   [address] [place] [address], [address]   [address]  [address]  Phone#  [phone]  Fax#  [phone]  *-*-*-*-*-*-*-*-*-*-*-*-*-*-*-*-*-*-*-*-*-*-*-*-*-*-*-*-*-*-*-*-*-*-*-*-*-*-*-*-*-*-*-*-*-*-*-*-*-*-*-*-*-*    Sagrario Peck DATE: 11/01/18 2:18 PM  PATIENT NAME: Max Mcmullen   1975    [id number]  Age: 41 y o  Sex: female  AUTHOR: Mara Whitley MD  PRIMARYCARE PHYSICIAN: Flakita Watson MD    *-*-*-*-*-*-*-*-*-*-*-*-*-*-*-*-*-*-*-*-*-*-*-*-*-*-*-*-*-*-*-*-*-*-*-*-*-*-*-*-*-*-*-*-*-*-*-*-*-*-*-*-*-*-  REASON FOR REFERRAL:   Evaluation of PVCs    *-*-*-*-*-*-*-*-*-*-*-*-*-*-*-*-*-*-*-*-*-*-*-*-*-*-*-*-*-*-*-*-*-*-*-*-*-*-*-*-*-*-*-*-*-*-*-*-*-*-*-*-*-*-  CARDIOLOGY ASSESSMENT & PLAN:  No problem-specific Assessment & Plan notes found for this encounter  *-*-*-*-*-*-*-*-*-*-*-*-*-*-*-*-*-*-*-*-*-*-*-*-*-*-*-*-*-*-*-*-*-*-*-*-*-*-*-*-*-*-*-*-*-*-*-*-*-*-*-*-*-*-  CURRENT ECG:  Results for orders placed or performed in visit on 11/01/18   POCT ECG    Narrative    Sinus rhythm, HR 81 beats per min, left axis deviation, normal intervals, delayed R-wave transition, no significant ST-T abnormalities  No PVCs were noted on  this EKG        *-*-*-*-*-*-*-*-*-*-*-*-*-*-*-*-*-*-*-*-*-*-*-*-*-*-*-*-*-*-*-*-*-*-*-*-*-*-*-*-*-*-*-*-*-*-*-*-*-*-*-*-*-*-  HISTORY OF PRESENT ILLNESS:   patient is a 41 year female of [place] origin has been referred for evaluation of palpitations since 2018  She has no significant past medical history except for history of diverticulitis, history of  and history of intrauterine device implantation  She reports that since  2018  She has been experiencing on and off palpitation episodes  She reports that symptoms started a day after she had dental implants  She felt them like strong extra heart beats interspersed with normal heart beats  She was evaluated at [facility] on 2018 and was noted to have sinus rhythm with PVCs  Her blood work was overall unremarkable and subsequently she was discharged home  She followed up with her primary care physician and she was put on an event monitor between 2018 and 2018  This was significant for predominant sinus rhythm with occasional occurrence of PVCs in bigeminy pattern  The total burden of PVCs is not reported         She reports that since the event she went to the emergency room on an of she continues to feel the palpitations  The symptoms where worse in the month of 2018  She describes that the symptoms are often  Precipitated when she is in an emotionally stressed situation or when she is upset  Occasionally she also feels these after having a heavy meal   Symptoms are felt as is extra heartbeats rather than sustained palpitations  Events occur almost every day  These are not associated with diaphoresis, presyncope / syncope or typical chest pain  Symptoms often occur when she is active rather than when she is at rest  She does report that she feels very tired when she gets the PVCs  There is no relationship of symptoms with it with her menstrual cycle  In 2018 after her event monitor her primary care physician started her on atenolol therapy and since then she feels much better with reduction in frequency of symptoms  Now she rarely feels these and these are not its as prolonged as before    She denies any recent decline in her exercise tolerance   And also denies any typical anginal like symptoms  No orthopnea, PND or pedal edema either  *-*-*-*-*-*-*-*-*-*-*-*-*-*-*-*-*-*-*-*-*-*-*-*-*-*-*-*-*-*-*-*-*-*-*-*-*-*-*-*-*-*-*-*-*-*-*-*-*-*-*-*-*-*  PAST MEDICAL HISTORY:   Past Medical History:   Diagnosis Date    Diverticulitis        PAST SURGICAL HISTORY:   Past Surgical History:   Procedure Laterality Date     SECTION      x4    VT COLONOSCOPY FLX DX W/COLLJ SPEC WHEN PFRMD N/A 5/15/2017    Procedure: COLONOSCOPY;  Surgeon: Yuli Biggs MD;  Location: [facility] GI [facility]; Service: Gastroenterology       FAMILY HISTORY:  No family history on file  SOCIAL HISTORY:  @[facility]3@  History   Smoking Status    Former Smoker   Smokeless Tobacco    Former User    Quit date: 5/15/2016     History   Alcohol Use No     History   Drug Use No       She smokes hookah occasionally, once 1-2 week  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  *-*-*-*-*-*-*-*-*-*-*-*-*-*-*-*-*-*-*-*-*-*-*-*-*-*-*-*-*-*-*-*-*-*-*-*-*-*-*-*-*-*-*-*-*-*-*-*-*-*-*-*-*-*  ALLERGIES:  No Known Allergies  *-*-*-*-*-*-*-*-*-*-*-*-*-*-*-*-*-*-*-*-*-*-*-*-*-*-*-*-*-*-*-*-*-*-*-*-*-*-*-*-*-*-*-*-*-*-*-*-*-*-*-*-*-*  CURRENT OUTPATIENT MEDICATIONS:     Current Outpatient Prescriptions:     atenolol (TENORMIN) 25 mg tablet, Take 25 mg by mouth daily, Disp: , Rfl: 0    multivitamin (THERAGRAN) TABS, Take 1 tablet by mouth, Disp: , Rfl:     PARAGARD INTRAUTERINE COPPER IUD, by Intrauterine route, Disp: , Rfl:     Vitamin D, Cholecalciferol, 1000 units CAPS, Take 1,000 Units by mouth, Disp: , Rfl:     *-*-*-*-*-*-*-*-*-*-*-*-*-*-*-*-*-*-*-*-*-*-*-*-*-*-*-*-*-*-*-*-*-*-*-*-*-*-*-*-*-*-*-*-*-*-*-*-*-*-*-*-*-*  REVIEW OF SYMPTOMS:    Positive for:  ***  Negative for: All remaining as reviewed below and in HPI      SYSTEM SYMPTOMS REVIEWED:  Generalweight change, fever, night sweats  Respirato  Cardiovascularchest pain, syncope, dyspnea on exertion, edema, decline in exercise tolerance, claudication   Gastrointestinalpersistent vomiting, diarrhea, abdominal distention, blood in stool   Muscular or skeletaljoint pain or swelling   Neurologicheadaches, syncope, abnormal movement  Hematologichistory of easy bruising and bleeding   Endocrinethyroid enlargement, heat or cold intolerance, polyuria   Psychiatricanxiety, depression     *-*-*-*-*-*-*-*-*-*-*-*-*-*-*-*-*-*-*-*-*-*-*-*-*-*-*-*-*-*-*-*-*-*-*-*-*-*-*-*-*-*-*-*-*-*-*-*-*-*-*-*-*-*-  VITAL SIGNS:  Vitals:    11/01/18 1347   BP: 108/70   Pulse: 81   SpO2: 98%   Weight: 72 9 kg (160 lb 12 8 oz)   Height: 5' 7" (1 702 m)       *-*-*-*-*-*-*-*-*-*-*-*-*-*-*-*-*-*-*-*-*-*-*-*-*-*-*-*-*-*-*-*-*-*-*-*-*-*-*-*-*-*-*-*-*-*-*-*-*-*-*-*-*-*-  PHYSICAL EXAM:  General Appearance:    Alert, cooperative, no distress, appears stated age***   Head, Eyes, ENT:    No obvious abnormality, moist mucous mebranes  Neck:   Supple, no carotid bruit or JVD   Back:     Symmetric, no curvature  Lungs:     Respirations unlabored  Clear to auscultation bilaterally,    Chest wall:    No tenderness or deformity   Heart:    Regular rate and rhythm, S1 and S2 normal, no murmur, rub  or gallop  ***   Abdomen:     Soft, non-tender, No obvious masses, or organomegaly   Extremities:   Extremities normal, no cyanosis or edema ***   Skin:   Skin color, texture, turgor normal, no rashes or lesions     *-*-*-*-*-*-*-*-*-*-*-*-*-*-*-*-*-*-*-*-*-*-*-*-*-*-*-*-*-*-*-*-*-*-*-*-*-*-*-*-*-*-*-*-*-*-*-*-*-*-*-*-*-*-  LABORATORY DATA:  I have personally reviewed pertinent labs  Recent blood work performed on October 20, 2018 indicates normal renal function and normal LFTs, normal hemoglobin and hematocrit, normal thyroid function tests, borderline decreased calcium at 8 0 3, low vitamin-D levels      No results found for: NA, K, CL, CO2, ANIONGAP, BUN, CREATININE, EGFR, GLUCOSE, CALCIUM, AST, ALT, ALKPHOS, PROT, BILITOT, MG  No results found for: WBC, HGB, PLT  No results found for: PT, PTT, INR  No results found for: CKMB, BNP, DIGOXIN  No results found for: TSH  No results found for: CHOL, HDL, LDL, TRIG   No results found for: HGBA1C  No results found for: BLOODCX, SPUTUMCULTUR, GRAMSTAIN, URINECX, WOUNDCULT, BODYFLUIDCUL, MRSACULTURE, INFLUAPCR, INFLUBPCR, RSVPCR, LEGIONELLAUR, CDIFFTOXINB    *-*-*-*-*-*-*-*-*-*-*-*-*-*-*-*-*-*-*-*-*-*-*-*-*-*-*-*-*-*-*-*-*-*-*-*-*-*-*-*-*-*-*-*-*-*-*-*-*-*-*-*-*-*-  RADIOLOGY RESULTS:  Xr Knee 3 Vw Right Non Injury    Result Date: 5/4/2018  Impression: Mild osteoarthritis  Workstation performed: [id number]       *-*-*-*-*-*-*-*-*-*-*-*-*-*-*-*-*-*-*-*-*-*-*-*-*-*-*-*-*-*-*-*-*-*-*-*-*-*-*-*-*-*-*-*-*-*-*-*-*-*-*-*-*-*-  ECHOCARDIOGRAM AND OTHER CARDIOLOGY RESULTS:  No results found for this or any previous visit  No results found for this or any previous visit  No results found for this or any previous visit  No results found for this or any previous visit       *-*-*-*-*-*-*-*-*-*-*-*-*-*-*-*-*-*-*-*-*-*-*-*-*-*-*-*-*-*-*-*-*-*-*-*-*-*-*-*-*-*-*-*-*-*-*-*-*-*-*-*-*-*-  SIGNATURES:   [unfilled]   Mara Whitley MD     CC:   MD Nory Olivia MD

## 2018-11-01 NOTE — PROGRESS NOTES
[address]    [address], [address]   [address]  [address]  Phone#  [phone]  Fax#  [phone]  *-*-*-*-*-*-*-*-*-*-*-*-*-*-*-*-*-*-*-*-*-*-*-*-*-*-*-*-*-*-*-*-*-*-*-*-*-*-*-*-*-*-*-*-*-*-*-*-*-*-*-*-*-*    ENCOUNTER DATE: 11/01/18 2:38 PM  PATIENT NAME: Jazmyn Pleitez   1975    [id number]  Age: 41 y o  Sex: female  AUTHOR: Mary Amin MD  PRIMARYCARE PHYSICIAN: Colette Fuller MD    *-*-*-*-*-*-*-*-*-*-*-*-*-*-*-*-*-*-*-*-*-*-*-*-*-*-*-*-*-*-*-*-*-*-*-*-*-*-*-*-*-*-*-*-*-*-*-*-*-*-*-*-*-*-  REASON FOR REFERRAL:   Evaluation of PVCs    *-*-*-*-*-*-*-*-*-*-*-*-*-*-*-*-*-*-*-*-*-*-*-*-*-*-*-*-*-*-*-*-*-*-*-*-*-*-*-*-*-*-*-*-*-*-*-*-*-*-*-*-*-*-  CARDIOLOGY ASSESSMENT & PLAN:  Palpitations   41 year young female with the palpitations which seems to be more often occurring with emotional stress  Event monitoring suggesting of isolated premature ventricular contractions  Has found significant improvement with addition of beta-blocker  There is no recent decline in exercise tolerance  Current ECG is unremarkable for evidence of prior infarction  No family history of premature CAD or sudden cardiac death  Laboratory evaluation including thyroid function test is normal      Evaluation is consistent with probably benign PVCs, possibly precipitated in emotionally distressed state     -   Recommend exercise treadmill stress test and a 2D echocardiogram for further evaluation  If her left ventricular function is normal and she has no exercised provoked increasing PVCs or nonsustained or sustained arrhythmias then no further workup is necessary at this time  She would need periodic follow-up to assess the burden  in the future  -  We will continue her current beta-blocker medication atenolol  She is made awareMedication is associated with intrauterine growth  Retardation in pregnant women and has pregnancy category C   Status   She acknowledges that she cannot be pregnant at this time as she uses an IUD and she does not intend to become pregnant at this time  *-*-*-*-*-*-*-*-*-*-*-*-*-*-*-*-*-*-*-*-*-*-*-*-*-*-*-*-*-*-*-*-*-*-*-*-*-*-*-*-*-*-*-*-*-*-*-*-*-*-*-*-*-*-  CURRENT ECG:  Results for orders placed or performed in visit on 18   POCT ECG    Narrative    Sinus rhythm, HR 81 beats per min, left axis deviation, normal intervals, delayed R-wave transition, no significant ST-T abnormalities  No PVCs were noted on  this EKG        *-*-*-*-*-*-*-*-*-*-*-*-*-*-*-*-*-*-*-*-*-*-*-*-*-*-*-*-*-*-*-*-*-*-*-*-*-*-*-*-*-*-*-*-*-*-*-*-*-*-*-*-*-*-  HISTORY OF PRESENT ILLNESS:   patient is a 41 year female of [place] origin has been referred for evaluation of palpitations since 2018  She has no significant past medical history except for history of diverticulitis, history of  and history of intrauterine device implantation  She reports that since  2018  She has been experiencing on and off palpitation episodes  She reports that symptoms started a day after she had dental implants  She felt them like strong extra heart beats interspersed with normal heart beats  She was evaluated at [facility] Room on 2018 and was noted to have sinus rhythm with PVCs  Her blood work was overall unremarkable and subsequently she was discharged home  She followed up with her primary care physician and she was put on an event monitor between 2018 and 2018  This was significant for predominant sinus rhythm with occasional occurrence of PVCs in bigeminy pattern  The total burden of PVCs is not reported         She reports that since the event she went to the emergency room on an of she continues to feel the palpitations  The symptoms where worse in the month of 2018  She describes that the symptoms are often  Precipitated when she is in an emotionally stressed situation or when she is upset  Occasionally she also feels these after having a heavy meal   Symptoms are felt as is extra heartbeats rather than sustained palpitations  Events occur almost every day  These are not associated with diaphoresis, presyncope / syncope or typical chest pain  Symptoms often occur when she is active rather than when she is at rest  She does report that she feels very tired when she gets the PVCs  There is no relationship of symptoms with it with her menstrual cycle  In 2018 after her event monitor her primary care physician started her on atenolol therapy and since then she feels much better with reduction in frequency of symptoms  Now she rarely feels these and these are not its as prolonged as before  She denies any recent decline in her exercise tolerance   And also denies any typical anginal like symptoms  No orthopnea, PND or pedal edema either  *-*-*-*-*-*-*-*-*-*-*-*-*-*-*-*-*-*-*-*-*-*-*-*-*-*-*-*-*-*-*-*-*-*-*-*-*-*-*-*-*-*-*-*-*-*-*-*-*-*-*-*-*-*  PAST MEDICAL HISTORY:   Past Medical History:   Diagnosis Date    Diverticulitis        PAST SURGICAL HISTORY:   Past Surgical History:   Procedure Laterality Date     SECTION      x4    ID COLONOSCOPY FLX DX W/COLLJ SPEC WHEN PFRMD N/A 5/15/2017    Procedure: COLONOSCOPY;  Surgeon: Merline Monique MD;  Location: [facility] GI [facility]; Service: Gastroenterology       FAMILY HISTORY:  No family history on file  SOCIAL HISTORY:  [unfilled]  History   Smoking Status    Former Smoker   Smokeless Tobacco    Former User    Quit date: 5/15/2016     History   Alcohol Use No     History   Drug Use No       She smokes hookah occasionally, once 1-2 week  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      *-*-*-*-*-*-*-*-*-*-*-*-*-*-*-*-*-*-*-*-*-*-*-*-*-*-*-*-*-*-*-*-*-*-*-*-*-*-*-*-*-*-*-*-*-*-*-*-*-*-*-*-*-*  ALLERGIES:  No Known Allergies  *-*-*-*-*-*-*-*-*-*-*-*-*-*-*-*-*-*-*-*-*-*-*-*-*-*-*-*-*-*-*-*-*-*-*-*-*-*-*-*-*-*-*-*-*-*-*-*-*-*-*-*-*-*  CURRENT OUTPATIENT MEDICATIONS:     Current Outpatient Prescriptions:     atenolol (TENORMIN) 25 mg tablet, Take 25 mg by mouth daily, Disp: , Rfl: 0    multivitamin (THERAGRAN) TABS, Take 1 tablet by mouth, Disp: , Rfl:     PARAGARD INTRAUTERINE COPPER IUD, by Intrauterine route, Disp: , Rfl:     Vitamin D, Cholecalciferol, 1000 units CAPS, Take 1,000 Units by mouth, Disp: , Rfl:     *-*-*-*-*-*-*-*-*-*-*-*-*-*-*-*-*-*-*-*-*-*-*-*-*-*-*-*-*-*-*-*-*-*-*-*-*-*-*-*-*-*-*-*-*-*-*-*-*-*-*-*-*-*  REVIEW OF SYMPTOMS:    Positive for:   Palpitations, fatigue  Negative for: All remaining as reviewed below and in HPI  SYSTEM SYMPTOMS REVIEWED:  General--weight change, fever, night sweats  Respirato  Cardiovascular--chest pain, syncope, dyspnea on exertion, edema, decline in exercise tolerance, claudication   Gastrointestinal--persistent vomiting, diarrhea, abdominal distention, blood in stool   Muscular or skeletal--joint pain or swelling   Neurologic--headaches, syncope, abnormal movement  Hematologic--history of easy bruising and bleeding   Endocrine--thyroid enlargement, heat or cold intolerance, polyuria   Psychiatric--anxiety, depression     *-*-*-*-*-*-*-*-*-*-*-*-*-*-*-*-*-*-*-*-*-*-*-*-*-*-*-*-*-*-*-*-*-*-*-*-*-*-*-*-*-*-*-*-*-*-*-*-*-*-*-*-*-*-  VITAL SIGNS:  Vitals:    11/01/18 1347   BP: 108/70   Pulse: 81   SpO2: 98%   Weight: 72 9 kg (160 lb 12 8 oz)   Height: 5' 7" (1 702 m)       *-*-*-*-*-*-*-*-*-*-*-*-*-*-*-*-*-*-*-*-*-*-*-*-*-*-*-*-*-*-*-*-*-*-*-*-*-*-*-*-*-*-*-*-*-*-*-*-*-*-*-*-*-*-  PHYSICAL EXAM:  General Appearance:    Alert, cooperative, no distress, appears stated age   Head, Eyes, ENT:    No obvious abnormality, moist mucous mebranes  Neck:   Supple, no carotid bruit or JVD   Back:     Symmetric, no curvature  Lungs:     Respirations unlabored   Clear to auscultation bilaterally,    Chest wall:    No tenderness or deformity   Heart:    Regular rate and rhythm, S1 and S2 normal, no murmur, rub  or gallop  Abdomen:     Soft, non-tender, No obvious masses, or organomegaly   Extremities:   Extremities normal, no cyanosis or edema    Skin:   Skin color, texture, turgor normal, no rashes or lesions     *-*-*-*-*-*-*-*-*-*-*-*-*-*-*-*-*-*-*-*-*-*-*-*-*-*-*-*-*-*-*-*-*-*-*-*-*-*-*-*-*-*-*-*-*-*-*-*-*-*-*-*-*-*-  LABORATORY DATA:  I have personally reviewed pertinent labs  Recent blood work performed on October 20, 2018 indicates normal renal function and normal LFTs, normal hemoglobin and hematocrit, normal thyroid function tests, borderline decreased calcium at 8 0 3, low vitamin-D levels  No results found for: NA, K, CL, CO2, ANIONGAP, BUN, CREATININE, EGFR, GLUCOSE, CALCIUM, AST, ALT, ALKPHOS, PROT, BILITOT, MG  No results found for: WBC, HGB, PLT  No results found for: PT, PTT, INR  No results found for: CKMB, BNP, DIGOXIN  No results found for: TSH  No results found for: CHOL, HDL, LDL, TRIG   No results found for: HGBA1C  No results found for: BLOODCX, SPUTUMCULTUR, GRAMSTAIN, URINECX, WOUNDCULT, BODYFLUIDCUL, MRSACULTURE, INFLUAPCR, INFLUBPCR, RSVPCR, LEGIONELLAUR, CDIFFTOXINB    *-*-*-*-*-*-*-*-*-*-*-*-*-*-*-*-*-*-*-*-*-*-*-*-*-*-*-*-*-*-*-*-*-*-*-*-*-*-*-*-*-*-*-*-*-*-*-*-*-*-*-*-*-*-  RADIOLOGY RESULTS:  Xr Knee 3 Vw Right Non Injury    Result Date: 5/4/2018  Impression: Mild osteoarthritis  Workstation performed: [id number]       *-*-*-*-*-*-*-*-*-*-*-*-*-*-*-*-*-*-*-*-*-*-*-*-*-*-*-*-*-*-*-*-*-*-*-*-*-*-*-*-*-*-*-*-*-*-*-*-*-*-*-*-*-*-  ECHOCARDIOGRAM AND OTHER CARDIOLOGY RESULTS:  No results found for this or any previous visit  No results found for this or any previous visit  No results found for this or any previous visit  No results found for this or any previous visit  *-*-*-*-*-*-*-*-*-*-*-*-*-*-*-*-*-*-*-*-*-*-*-*-*-*-*-*-*-*-*-*-*-*-*-*-*-*-*-*-*-*-*-*-*-*-*-*-*-*-*-*-*-*-  SIGNATURES:   @LVX@   [facility] MD Alfonzo     CC:   MD Hemant Valentine MD

## 2018-11-01 NOTE — ASSESSMENT & PLAN NOTE
41 year young female with the palpitations which seems to be more often occurring with emotional stress  Event monitoring suggesting of isolated premature ventricular contractions  Has found significant improvement with addition of beta-blocker  There is no recent decline in exercise tolerance  Current ECG is unremarkable for evidence of prior infarction  No family history of premature CAD or sudden cardiac death  Laboratory evaluation including thyroid function test is normal      Evaluation is consistent with probably benign PVCs, possibly precipitated in emotionally distressed state     -   Recommend exercise treadmill stress test and a 2D echocardiogram for further evaluation  If her left ventricular function is normal and she has no exercised provoked increasing PVCs or nonsustained or sustained arrhythmias then no further workup is necessary at this time  She would need periodic follow-up to assess the burden  in the future  -  We will continue her current beta-blocker medication atenolol  She is made awareMedication is associated with intrauterine growth  Retardation in pregnant women and has pregnancy category C   Status  She acknowledges that she cannot be pregnant at this time as she uses an IUD and she does not intend to become pregnant at this time

## 2018-11-09 ENCOUNTER — TRANSCRIBE ORDERS (OUTPATIENT)
Dept: ADMINISTRATIVE | Facility: HOSPITAL | Age: 43
End: 2018-11-09

## 2018-11-09 DIAGNOSIS — Z12.39 SCREENING BREAST EXAMINATION: Primary | ICD-10-CM

## 2018-11-14 ENCOUNTER — HOSPITAL ENCOUNTER (OUTPATIENT)
Dept: MAMMOGRAPHY | Facility: CLINIC | Age: 43
Discharge: HOME | End: 2018-11-14
Payer: COMMERCIAL

## 2018-11-14 VITALS — BODY MASS INDEX: 25.11 KG/M2 | HEIGHT: 67 IN | WEIGHT: 160 LBS

## 2018-11-14 DIAGNOSIS — Z12.39 SCREENING BREAST EXAMINATION: ICD-10-CM

## 2018-11-14 PROCEDURE — 77067 SCR MAMMO BI INCL CAD: CPT

## 2018-11-19 ENCOUNTER — HOSPITAL ENCOUNTER (OUTPATIENT)
Dept: NON INVASIVE DIAGNOSTICS | Facility: HOSPITAL | Age: 43
Discharge: HOME | End: 2018-11-19
Attending: INTERNAL MEDICINE
Payer: COMMERCIAL

## 2018-11-19 DIAGNOSIS — R00.2 PALPITATIONS: ICD-10-CM

## 2018-11-19 DIAGNOSIS — I49.3 PVC'S (PREMATURE VENTRICULAR CONTRACTIONS): ICD-10-CM

## 2018-11-19 PROCEDURE — 93017 CV STRESS TEST TRACING ONLY: CPT

## 2018-11-19 PROCEDURE — 93016 CV STRESS TEST SUPVJ ONLY: CPT | Performed by: INTERNAL MEDICINE

## 2018-11-19 PROCEDURE — 93306 TTE W/DOPPLER COMPLETE: CPT | Performed by: INTERNAL MEDICINE

## 2018-11-19 PROCEDURE — 93018 CV STRESS TEST I&R ONLY: CPT | Performed by: INTERNAL MEDICINE

## 2018-11-19 PROCEDURE — 93306 TTE W/DOPPLER COMPLETE: CPT

## 2018-11-23 LAB
CHEST PAIN STATEMENT: NORMAL
MAX DIASTOLIC BP: 50 MMHG
MAX HEART RATE: 164 BPM
MAX PREDICTED HEART RATE: 179 BPM
MAX. SYSTOLIC BP: 136 MMHG
PROTOCOL NAME: NORMAL
REASON FOR TERMINATION: NORMAL
TARGET HR FORMULA: NORMAL
TEST INDICATION: NORMAL
TIME IN EXERCISE PHASE: NORMAL

## 2018-12-19 ENCOUNTER — ANNUAL EXAM (OUTPATIENT)
Dept: OBGYN CLINIC | Facility: CLINIC | Age: 43
End: 2018-12-19
Payer: COMMERCIAL

## 2018-12-19 VITALS
HEIGHT: 67 IN | OXYGEN SATURATION: 98 % | HEART RATE: 74 BPM | BODY MASS INDEX: 25.58 KG/M2 | DIASTOLIC BLOOD PRESSURE: 64 MMHG | WEIGHT: 163 LBS | SYSTOLIC BLOOD PRESSURE: 116 MMHG

## 2018-12-19 DIAGNOSIS — Z30.431 CONTRACEPTIVE, SURVEILLANCE, INTRAUTERINE DEVICE: ICD-10-CM

## 2018-12-19 DIAGNOSIS — Z12.31 VISIT FOR SCREENING MAMMOGRAM: ICD-10-CM

## 2018-12-19 DIAGNOSIS — Z01.419 ENCOUNTER FOR ANNUAL ROUTINE GYNECOLOGICAL EXAMINATION: Primary | ICD-10-CM

## 2018-12-19 DIAGNOSIS — E58 DIETARY CALCIUM DEFICIENCY: ICD-10-CM

## 2018-12-19 DIAGNOSIS — Z12.4 PAP SMEAR FOR CERVICAL CANCER SCREENING: ICD-10-CM

## 2018-12-19 PROCEDURE — 87624 HPV HI-RISK TYP POOLED RSLT: CPT | Performed by: OBSTETRICS & GYNECOLOGY

## 2018-12-19 PROCEDURE — 99396 PREV VISIT EST AGE 40-64: CPT | Performed by: OBSTETRICS & GYNECOLOGY

## 2018-12-19 PROCEDURE — G0145 SCR C/V CYTO,THINLAYER,RESCR: HCPCS | Performed by: OBSTETRICS & GYNECOLOGY

## 2018-12-19 RX ORDER — ERGOCALCIFEROL 1.25 MG/1
50000 CAPSULE ORAL WEEKLY
Refills: 0 | COMMUNITY
Start: 2018-11-01 | End: 2018-12-19

## 2018-12-19 NOTE — PROGRESS NOTES
Pt is a 41 y o   with Patient's last menstrual period was 2018 (exact date)  using [facility] presents for preventive care  She notes the same partner since her last STI evaluation  In her lifetime she has been involved with 1 partner   Safe sexual practices (monogomy, condoms) are followed consistently  · She does  feel safe in the relationship  She does feel safe in her home  · Her calcium intake encompasses multivitamin and milk (cow, goat, almond, cashew, soy, etc) for a total of 3-4 servings daily on average  She does take additional Vitamin D (MVI or supplement)  · She exercises 5 times per week  · Her menses occur every 28 Days, last 8-10 days and require panty liner for spotting x 3, maxi pad every 6 hours x 4 days, and then panty liner x 3 days  Menstrual History:  OB History      Para Term  AB Living    4 4 4     4    SAB TAB Ectopic Multiple Live Births                     Obstetric Comments    FT C/S x 4    Menarche: 14    28/8-10/3 days spotting, 4 days menses, 3 days spotting  Menarche age: 17  Patient's last menstrual period was 2018 (exact date)  ·      · She has never recieved an HPV vaccine  · tobacco use : does not use tobacco              · Colonoscopy:  wnl  · Pap: --wnl, repeat today  · Mammogram: 2018--wnl, repeat 2019    Past Medical History:   Diagnosis Date    Diverticulitis     Palpitations     Varicella vaccination        Past Surgical History:   Procedure Laterality Date     SECTION      x4    MOUTH SURGERY      implant of tooth x 2    NC COLONOSCOPY FLX DX W/COLLJ SPEC WHEN PFRMD N/A 5/15/2017    Procedure: COLONOSCOPY;  Surgeon: Lana Gastelum MD;  Location: [facility] GI [facility];   Service: Gastroenterology    WISDOM TOOTH EXTRACTION         OB History    Para Term  AB Living   4 4 4     4   SAB TAB Ectopic Multiple Live Births                  # Outcome Date GA Lbr Isaias/2nd Weight Sex Delivery Anes PTL Lv   4 Term            3 Term            2 Term            1 Term               Obstetric Comments   FT C/S x 4      Menarche: 14      28/8-10/3 days spotting, 4 days menses, 3 days spotting          Gyn HX:  denies STD, abnormal pap, significant dysmenorrhea or irregular menses       Current Outpatient Prescriptions:     atenolol (TENORMIN) 25 mg tablet, Take 1 tablet (25 mg total) by mouth daily, Disp: 90 tablet, Rfl: 3    multivitamin (THERAGRAN) TABS, Take 1 tablet by mouth, Disp: , Rfl:     PARAGARD INTRAUTERINE COPPER IUD, by Intrauterine route, Disp: , Rfl:     Vitamin D, Cholecalciferol, 1000 units CAPS, Take 1,000 Units by mouth, Disp: , Rfl:     No Known Allergies    Social History     Social History    Marital status: /Civil Union     Spouse name: Constantino Soulier     Number of children: 4    Years of education: HS     Occupational History    unemployed      Social History Main Topics    Smoking status: Former Smoker    Smokeless tobacco: Never Used      Comment: Hooka    Alcohol use No    Drug use: No    Sexual activity: Yes     Partners: Male     Birth control/ protection: IUD      Comment: Lifetime patners; 1     Other Topics Concern    None     Social History Narrative    Church: [employer]    Accepts blood products        Exercise: 5x/week x 30 min    Calcium: Multivitamin daily, 1 c milk daily       Family History   Problem Relation Age of Onset    Brain cancer Maternal Grandmother     Breast cancer Maternal Aunt 79    Uterine cancer Mother     Diabetes Mother     Hypertension Mother     No Known Problems Father     Fibroids Sister     Other Sister         bilateral hip replacement    No Known Problems Brother     Liver disease Sister         cyst on liver    No Known Problems Sister     No Known Problems Sister     No Known Problems Sister     No Known Problems Brother     Lung disease Brother     Hypertension Brother     No Known Problems Brother  Other Brother         palpitations    No Known Problems Half-Brother     Lung cancer Maternal Uncle     Ovarian cancer Neg Hx     Colon cancer Neg Hx        Blood pressure 116/64, pulse 74, height 5' 7" (1 702 m), weight 73 9 kg (163 lb), last menstrual period 2018, SpO2 98 %  and Body mass index is 25 53 kg/m²  Physical Exam   Constitutional: She is oriented to person, place, and time  She appears well-developed and well-nourished  HENT:   Head: Normocephalic and atraumatic  Eyes: Conjunctivae and EOM are normal    Neck: Normal range of motion  Neck supple  No tracheal deviation present  No thyromegaly present  Cardiovascular: Normal rate, regular rhythm and normal heart sounds  Pulmonary/Chest: Effort normal and breath sounds normal  No stridor  No respiratory distress  She has no wheezes  She has no rales  Abdominal: Soft  Bowel sounds are normal  She exhibits no distension and no mass  There is no tenderness  There is no rebound and no guarding  Musculoskeletal: Normal range of motion  She exhibits no edema or tenderness  Lymphadenopathy:     She has no cervical adenopathy  Neurological: She is alert and oriented to person, place, and time  Skin: Skin is warm  No rash noted  No erythema  Psychiatric: She has a normal mood and affect  Her behavior is normal  Judgment and thought content normal      Breasts: breasts appear normal, no suspicious masses, no skin or nipple changes or axillary nodes, symmetric fibrous changes in both upper outer quadrants         vulva: normal external genitalia for age and no lesions, masses, epithelial changes, or exudate  vagina: color pink and rugae  well formed rugae  cervix: parous, no lesions ,  IUD string  3 cm and pap obtained  uterus: NSSC, AF, NT, mobile  adnexa: no masses or tenderness      A/P:  Pt is a 41 y o   with      Diagnoses and all orders for this visit:    Encounter for annual routine gynecological examination    Pap smear for cervical cancer screening  -     Cancel: Thinprep Tis Pap and HPV mRNA E6/E7 Reflex HPV 16,18/45  -     Liquid-based pap, screening    Visit for screening mammogram  -     Mammo screening bilateral w cad; Future    Contraceptive, surveillance, intrauterine device    Dietary calcium deficiency       Patient advised recommendation of daily dietary calcium of  1000 mg calcium  Patient advised recommendation of exercise 5 times per week for 30 minutes  Patient advised recommendation of BMI to be between 19-25

## 2018-12-21 LAB
HPV HR 12 DNA CVX QL NAA+PROBE: NEGATIVE
HPV16 DNA CVX QL NAA+PROBE: NEGATIVE
HPV18 DNA CVX QL NAA+PROBE: NEGATIVE

## 2018-12-24 LAB
LAB AP GYN PRIMARY INTERPRETATION: NORMAL
Lab: NORMAL

## 2019-02-04 ENCOUNTER — OFFICE VISIT (OUTPATIENT)
Dept: CARDIOLOGY CLINIC | Facility: CLINIC | Age: 44
End: 2019-02-04
Payer: COMMERCIAL

## 2019-02-04 VITALS
HEIGHT: 67 IN | DIASTOLIC BLOOD PRESSURE: 60 MMHG | HEART RATE: 62 BPM | BODY MASS INDEX: 26.21 KG/M2 | OXYGEN SATURATION: 97 % | WEIGHT: 167 LBS | SYSTOLIC BLOOD PRESSURE: 100 MMHG

## 2019-02-04 DIAGNOSIS — R00.2 PALPITATIONS: Primary | ICD-10-CM

## 2019-02-04 DIAGNOSIS — I49.3 PVC'S (PREMATURE VENTRICULAR CONTRACTIONS): ICD-10-CM

## 2019-02-04 PROCEDURE — 93000 ELECTROCARDIOGRAM COMPLETE: CPT | Performed by: INTERNAL MEDICINE

## 2019-02-04 PROCEDURE — 99214 OFFICE O/P EST MOD 30 MIN: CPT | Performed by: INTERNAL MEDICINE

## 2019-02-04 RX ORDER — ATENOLOL 25 MG/1
25 TABLET ORAL DAILY
Qty: 90 TABLET | Refills: 6 | Status: SHIPPED | OUTPATIENT
Start: 2019-02-04 | End: 2020-02-19

## 2019-02-04 NOTE — ASSESSMENT & PLAN NOTE
Improved symptoms on beta blockers along with giving up smoking hookah  Echocardiogram and stress test in November 2018 were overall unremarkable  Normal thyroid function at last blood work in October  Blood pressure is borderline low but she is not symptomatic     - Patient is advised to continue current medical therapy  - Dietary and medical compliance are reinforced  - Advised  to report any concerning symptoms such as chest pain, shortness of breath, decline in exercise tolerance or presyncope/syncope  I have spent 20 minutes with Patient  today in which greater than 50% of this time was spent in counseling/coordination of care regarding Diagnostic results  Patient and family education Prognosis

## 2019-02-04 NOTE — PATIENT INSTRUCTIONS
CARDIOLOGY ASSESSMENT & PLAN:  Palpitations  Improved symptoms on beta blockers along with giving up smoking jose  Echocardiogram and stress test in November 2018 were overall unremarkable  Normal thyroid function at last blood work in October  Blood pressure is borderline low but she is not symptomatic     - Patient is advised to continue current medical therapy  - Dietary and medical compliance are reinforced  - Advised  to report any concerning symptoms such as chest pain, shortness of breath, decline in exercise tolerance or presyncope/syncope  I have spent 20 minutes with Patient  today in which greater than 50% of this time was spent in counseling/coordination of care regarding Diagnostic results  Patient and family education Prognosis

## 2019-02-04 NOTE — PROGRESS NOTES
[facility]   [address] [facility], [place] [address]  Phone#  [phone]  Fax#  [phone]  *-*-*-*-*-*-*-*-*-*-*-*-*-*-*-*-*-*-*-*-*-*-*-*-*-*-*-*-*-*-*-*-*-*-*-*-*-*-*-*-*-*-*-*-*-*-*-*-*-*-*-*-*-*    ENCOUNTER DATE: 19 2:33 PM    PATIENT NAME: Magan Link   1975    [id number]  Age: 41 y o  Sex: female    AUTHOR: Jose Antonio Logan MD  PRIMARYCARE PHYSICIAN: Mirta Martell MD    DIAGNOSES:  1  Symptomatic premature ventricular contractions  2  History of  in the past  3  History of uterine implant  4  History of diverticulitis  Echocardiogram 2018:  - Normal left ventricular cavity size, wall thickness and systolic and diastolic function  EF approximately 60-65%  - No significant chamber hypertrophy or enlargement  - Mild aortic valve sclerosis, no aortic valve stenosis or regurgitation  - mild mitral valve leaflet sclerosis and early changes of mitral annular calcification, trace mitral and tricuspid valve regurgitation   - No obvious pulmonary hypertension   - No pericardial effusion  Exercise treadmill stress test in 2018:  Stress results: Duration of exercise was 10 min and 30 sec  Target heart rate was achieved  There was no chest pain during stress  IMPRESSIONS: 1  Exercise treadmill stress test is negative for ischemia at 91% of patient's age predicted maximal heart rate  2  Good exercise capacity  3  Normal resting blood pressure and appropriate blood pressure response to exercise  4  No chest pain reported with exercise  5  No significant arrhythmia noted with exercise  CURRENT ECG:  Results for orders placed or performed in visit on 19   POCT ECG    Narrative    Sinus rhythm, HR 62 beats per min, left axis deviation, borderline low QRS voltages, borderline incomplete right bundle-branch block, no significant ST T wave abnormalities         CARDIOLOGY ASSESSMENT & PLAN:  Palpitations  Improved symptoms on beta blockers along with giving up smoking hookah  Echocardiogram and stress test in November 2018 were overall unremarkable  Normal thyroid function at last blood work in October  Blood pressure is borderline low but she is not symptomatic     - Patient is advised to continue current medical therapy  - Dietary and medical compliance are reinforced  - Advised  to report any concerning symptoms such as chest pain, shortness of breath, decline in exercise tolerance or presyncope/syncope  I have spent 20 minutes with Patient  today in which greater than 50% of this time was spent in counseling/coordination of care regarding Diagnostic results  Patient and family education Prognosis  INTERVAL HISTORY & HISTORY OF PRESENT ILLNESS:  Ronald Fong is here for follow-up regarding her cardiac comorbidities which include:  Palpitations due to symptomatic premature ventricular contractions  She was seen by us in November 2018 and was referred to undergo a stress test and echocardiogram   She was advised to discontinue use of Hookah and continue use of beta-blocker  Today she reports improved symptoms of palpitations since last visit with only rare episodes of transient feeling of extra heartbeats  She has given up smoking hookah for 3 months and feels much better  Echocardiogram and treadmill stress test done in November were overall good  There have been no recent active symptoms of chest discomfort or exertional angina  There is no dyspnea with usual activities or exertion  No orthopnea, PND or pedal edema  No lightheadedness, presyncope, or syncope  Denies any recent hospitalizations or other illnesses  Reports being compliant with medications  She is very active and exercises regularly  REVIEW OF SYMPTOMS:    Positive for:  Occasional transient feeling of extra heartbeats  Negative for: All remaining as reviewed below and in HPI      SYSTEM SYMPTOMS REVIEWED:  General--weight change, fever, night sweats  Respirato  Cardiovascular--chest pain, syncope, dyspnea on exertion, edema, decline in exercise tolerance, claudication   Gastrointestinal--persistent vomiting, diarrhea, abdominal distention, blood in stool   Muscular or skeletal--joint pain or swelling   Neurologic--headaches, syncope, abnormal movement  Hematologic--history of easy bruising and bleeding   Endocrine--thyroid enlargement, heat or cold intolerance, polyuria   Psychiatric--anxiety, depression     *-*-*-*-*-*-*-*-*-*-*-*-*-*-*-*-*-*-*-*-*-*-*-*-*-*-*-*-*-*-*-*-*-*-*-*-*-*-*-*-*-*-*-*-*-*-*-*-*-*-*-*-*-*-  VITAL SIGNS:  Vitals:    02/04/19 1347   BP: 100/60   BP Location: Left arm   Patient Position: Sitting   Cuff Size: Adult   Pulse: 62   SpO2: 97%   Weight: 75 8 kg (167 lb)   Height: 5' 7" (1 702 m)       *-*-*-*-*-*-*-*-*-*-*-*-*-*-*-*-*-*-*-*-*-*-*-*-*-*-*-*-*-*-*-*-*-*-*-*-*-*-*-*-*-*-*-*-*-*-*-*-*-*-*-*-*-*-  PHYSICAL EXAM:  General Appearance:    Alert, cooperative, no distress, appears stated age   Head, Eyes, ENT:    No obvious abnormality, moist mucous mebranes  Neck:   Supple, no carotid bruit or JVD   Back:     Symmetric, no curvature  Lungs:     Respirations unlabored  Clear to auscultation bilaterally,    Chest wall:    No tenderness or deformity   Heart:    Regular rate and rhythm, S1 and S2 normal, no murmur, rub  or gallop     Abdomen:     Soft, non-tender, No obvious masses, or organomegaly   Extremities:   Extremities normal, no cyanosis or edema    Skin:   Skin color, texture, turgor normal, no rashes or lesions      *-*-*-*-*-*-*-*-*-*-*-*-*-*-*-*-*-*-*-*-*-*-*-*-*-*-*-*-*-*-*-*-*-*-*-*-*-*-*-*-*-*-*-*-*-*-*-*-*-*-*-*-*-*-  CURRENT MEDICATION LIST:    Current Outpatient Prescriptions:     atenolol (TENORMIN) 25 mg tablet, Take 1 tablet (25 mg total) by mouth daily, Disp: 90 tablet, Rfl: 3    multivitamin (THERAGRAN) TABS, Take 1 tablet by mouth, Disp: , Rfl:     PARAGARD INTRAUTERINE COPPER IUD, by Intrauterine route, Disp: , Rfl:     Vitamin D, Cholecalciferol, 1000 units CAPS, Take 1,000 Units by mouth, Disp: , Rfl:     ALLERGIES:  No Known Allergies    *-*-*-*-*-*-*-*-*-*-*-*-*-*-*-*-*-*-*-*-*-*-*-*-*-*-*-*-*-*-*-*-*-*-*-*-*-*-*-*-*-*-*-*-*-*-*-*-*-*-*-*-*-*-    LABORATORY DATA:  I have personally reviewed pertinent labs  No results found for: NA, K, CL, CO2, ANIONGAP, BUN, CREATININE, EGFR, GLUCOSE, CALCIUM, AST, ALT, ALKPHOS, PROT, BILITOT, MG  No results found for: WBC, HGB, PLT  No results found for: PT, PTT, INR  No results found for: CKMB, BNP, DIGOXIN  No results found for: TSH  No results found for: CHOL, HDL, LDL, TRIG   No results found for: HGBA1C  No results found for: Jasmin Tigre, GRAMSTAIN, URINECX, WOUNDCULT, BODYFLUIDCUL, MRSACULTURE, INFLUAPCR, INFLUBPCR, RSVPCR, LEGIONELLAUR, CDIFFTOXINB    *-*-*-*-*-*-*-*-*-*-*-*-*-*-*-*-*-*-*-*-*-*-*-*-*-*-*-*-*-*-*-*-*-*-*-*-*-*-*-*-*-*-*-*-*-*-*-*-*-*-*-*-*-*-  PREVIOUS CARDIOLOGY & RADIOLOGY RESULTS:  Results for orders placed during the hospital encounter of 18   Echo complete with contrast if indicated    [place] [address] [place], [place] [address]    Transthoracic Echocardiogram  2D, M-mode, Doppler, and Color Doppler    Study date:  2018    Patient: Joshua Zaldivar  MR number: [id number]  Account number: [de-identified]  : 1975  Age: 41 years  Gender: Female  Status: Outpatient  Location: [address] [place] [address]  Height: 67 in  Weight: 160 lb  BP: 108/ 70 mmHg    Indications: Tachycardia, palpitations    Diagnoses: R00 0 - Tachycardia, unspecified    Sonographer:  Umu Vidal CCT,RCS  Primary Physician:  Marion Sauceda MD  Referring Physician:  Mendez Pablo MD  Group:  [facility]  Interpreting Physician:  Mendez Pablo MD    IMPRESSIONS:  - Normal left ventricular cavity size, wall thickness and systolic and diastolic function  EF approximately 60-65%    - No significant chamber hypertrophy or enlargement  - Mild aortic valve sclerosis, no aortic valve stenosis or regurgitation  - mild mitral valve leaflet sclerosis and early changes of mitral annular calcification, trace mitral and tricuspid valve regurgitation   - No obvious pulmonary hypertension   - No pericardial effusion  No previous echocardiogram is available for comparison  SUMMARY    LEFT VENTRICLE:  Normal left ventricular cavity size, normal wall thickness, normal left ventricle systolic function and wall motion  Ejection fraction is estimated as around 60-65%  Normal diastolic function  RIGHT VENTRICLE:  Normal right ventricular size and systolic function  Normal estimated right ventricular systolic pressure  LEFT ATRIUM:  Normal left atrial cavity size  Intact interatrial septum  RIGHT ATRIUM:  Normal right atrial cavity size  MITRAL VALVE:  Mild mitral valve leaflet sclerosis, early changes of mitral annular calcification  Trace mitral valve regurgitation  AORTIC VALVE:  Tricuspid aortic valve with mild sclerosis  No aortic valve stenosis or regurgitation  TRICUSPID VALVE:  Trace tricuspid valve regurgitation  PULMONIC VALVE:  No significant pulmonic valve regurgitation  AORTA:  Aortic root and proximal ascending aorta are normal in size on 2D imaging  IVC, HEPATIC VEINS:  Inferior vena cava is normal in size and demonstrates appropriate respiratory phasic changes in diameter  PERICARDIUM:  No pericardial effusion  HISTORY: PRIOR HISTORY: Palpitations, former smoker    PROCEDURE: The study was performed in the [facility]  This was a routine study  The transthoracic approach was used  The study included complete 2D imaging, M-mode, complete spectral Doppler, and color Doppler  The heart rate was  72 bpm, at the start of the study  Images were obtained from the parasternal, apical, subcostal, and suprasternal notch acoustic windows   Image quality was adequate  LEFT VENTRICLE: Normal left ventricular cavity size, normal wall thickness, normal left ventricle systolic function and wall motion  Ejection fraction is estimated as around 60-65%  Normal diastolic function  RIGHT VENTRICLE: Normal right ventricular size and systolic function  Normal estimated right ventricular systolic pressure  LEFT ATRIUM: Normal left atrial cavity size  Intact interatrial septum  RIGHT ATRIUM: Normal right atrial cavity size  MITRAL VALVE: Mild mitral valve leaflet sclerosis, early changes of mitral annular calcification  Trace mitral valve regurgitation  AORTIC VALVE: Tricuspid aortic valve with mild sclerosis  No aortic valve stenosis or regurgitation  TRICUSPID VALVE: Trace tricuspid valve regurgitation  PULMONIC VALVE: No significant pulmonic valve regurgitation  PERICARDIUM: No pericardial effusion  AORTA: Aortic root and proximal ascending aorta are normal in size on 2D imaging  SYSTEMIC VEINS: IVC: Inferior vena cava is normal in size and demonstrates appropriate respiratory phasic changes in diameter      SYSTEM MEASUREMENT TABLES    2D  %FS: 32 43 %  Ao Diam: 2 76 cm  EDV(Teich): 84 73 ml  EF(Teich): 61 01 %  ESV(Teich): 33 04 ml  IVSd: 0 93 cm  LA Area: 14 46 cm2  LA Diam: 2 87 cm  LVEDV MOD A4C: 98 34 ml  LVEF MOD A4C: 64 89 %  LVESV MOD A4C: 34 52 ml  LVIDd: 4 34 cm  LVIDs: 2 93 cm  LVLd A4C: 8 36 cm  LVLs A4C: 6 8 cm  LVPWd: 0 86 cm  RA Area: 9 46 cm2  RVIDd: 3 02 cm  SV MOD A4C: 63 81 ml  SV(Teich): 51 7 ml    CW  AV Vmax: 1 62 m/s  AV maxPG: 10 44 mmHg  RAP: 10 mmHg  TR Vmax: 2 27 m/s  TR maxP 6 mmHg    PW  E': 0 15 m/s  E' Sept: 0 15 m/s  E/E': 5 13  E/E' Sept: 5 05  LVOT Vmax: 0 96 m/s  LVOT maxPG: 3 69 mmHg  MV A Rasmey: 0 65 m/s  MV Dec Yancey: 3 72 m/s2  MV DecT: 207 9 ms  MV E Ramsey: 0 77 m/s  MV E/A Ratio: 1 19  MV PHT: 60 29 ms  MVA By PHT: 3 65 cm2  PAEDP: 15 56 mmHg  PRend P 56 mmHg  PRend Vmax: 1 18 m/s  RVSP: 30 6 mmHg    [facility]redited [facility]    Prepared and electronically signed by    Janalyn Oppenheim, MD  Signed [id number] 10:30:11       No results found for this or any previous visit  No results found for this or any previous visit  No results found for this or any previous visit  No results found  *-*-*-*-*-*-*-*-*-*-*-*-*-*-*-*-*-*-*-*-*-*-*-*-*-*-*-*-*-*-*-*-*-*-*-*-*-*-*-*-*-*-*-*-*-*-*-*-*-*-*-*-*-*-  SIGNATURES:   @INR@   Janalyn Oppenheim, MD     CC:   Boston Rodriguez MD   No ref   provider found   *-*-*-*-*-*-*-*-*-*-*-*-*-*-*-*-*-*-*-*-*-*-*-*-*-*-*-*-*-*-*-*-*-*-*-*-*-*-*-*-*-*-*-*-*-*-*-*-*-*-*-*-*-*-    Social History     Social History    Marital status: /Civil Union     Spouse name: Leslee Zayas Number of children: 4    Years of education: HS     Occupational History    unemployed      Social History Main Topics    Smoking status: Former Smoker    Smokeless tobacco: Never Used      Comment: Hooka    Alcohol use No    Drug use: No    Sexual activity: Yes     Partners: Male     Birth control/ protection: IUD      Comment: Lifetime patners; 1     Other Topics Concern    Not on file     Social History Narrative    Jehovah's witness: Baptism    Accepts blood products        Exercise: 5x/week x 30 min    Calcium: Multivitamin daily, 1 c milk daily      Family History   Problem Relation Age of Onset    Brain cancer Maternal Grandmother     Breast cancer Maternal Aunt 79    Uterine cancer Mother     Diabetes Mother     Hypertension Mother     No Known Problems Father     Fibroids Sister     Other Sister         bilateral hip replacement    No Known Problems Brother     Liver disease Sister         cyst on liver    No Known Problems Sister     No Known Problems Sister     No Known Problems Sister     No Known Problems Brother     Lung disease Brother     Hypertension Brother     No Known Problems Brother     Other Brother         palpitations    No Known Problems Half-Brother     Lung cancer Maternal Uncle     Ovarian cancer Neg Hx     Colon cancer Neg Hx      Past Surgical History:   Procedure Laterality Date     SECTION      x4    MOUTH SURGERY      implant of tooth x 2    IA COLONOSCOPY FLX DX W/COLLJ SPEC WHEN PFRMD N/A 5/15/2017    Procedure: COLONOSCOPY;  Surgeon: Davis You MD;  Location: [facility];   Service: Gastroenterology    WISDOM TOOTH EXTRACTION

## 2019-02-04 NOTE — LETTER
February 4, 2019     Boston Rodriguez MD  [address] [place] [address]    Patient: Madeleine Abraham   YOB: 1975   Date of Visit: 2/4/2019       Dear Dr Matt King: Thank you for referring [facility] to me for evaluation  Below are my notes for this consultation  If you have questions, please do not hesitate to call me  I look forward to following your patient along with you           Sincerely,        Jose Antonio Logan MD        CC: No Recipients

## 2019-05-07 ENCOUNTER — TRANSCRIBE ORDERS (OUTPATIENT)
Dept: ADMINISTRATIVE | Facility: HOSPITAL | Age: 44
End: 2019-05-07

## 2019-05-07 ENCOUNTER — HOSPITAL ENCOUNTER (OUTPATIENT)
Dept: RADIOLOGY | Facility: HOSPITAL | Age: 44
Discharge: HOME | End: 2019-05-07
Payer: COMMERCIAL

## 2019-05-07 DIAGNOSIS — M25.512 LEFT SHOULDER PAIN, UNSPECIFIED CHRONICITY: ICD-10-CM

## 2019-05-07 DIAGNOSIS — M25.512 LEFT SHOULDER PAIN, UNSPECIFIED CHRONICITY: Primary | ICD-10-CM

## 2019-05-07 PROCEDURE — 73030 X-RAY EXAM OF SHOULDER: CPT

## 2019-05-17 ENCOUNTER — TELEPHONE (OUTPATIENT)
Dept: OBGYN CLINIC | Facility: CLINIC | Age: 44
End: 2019-05-17

## 2019-11-04 ENCOUNTER — TELEPHONE (OUTPATIENT)
Dept: OBGYN CLINIC | Facility: CLINIC | Age: 44
End: 2019-11-04

## 2019-11-04 DIAGNOSIS — N91.0 DELAYED MENSES: Primary | ICD-10-CM

## 2019-11-04 NOTE — TELEPHONE ENCOUNTER
Patient asking to speak to Dr Alon Orantes - she was becoming increasingly frustrated due to language barrier and not being able to explain what she needed  She stated she has her Paraguard and has not gotten her period for over 5 days and is very concerned  She tried explaining more information regarding a medication, but I was unable to assist her

## 2019-11-05 ENCOUNTER — APPOINTMENT (OUTPATIENT)
Dept: LAB | Facility: HOSPITAL | Age: 44
End: 2019-11-05
Payer: COMMERCIAL

## 2019-11-05 ENCOUNTER — TELEPHONE (OUTPATIENT)
Dept: OBGYN CLINIC | Facility: CLINIC | Age: 44
End: 2019-11-05

## 2019-11-05 DIAGNOSIS — N91.0 DELAYED MENSES: ICD-10-CM

## 2019-11-05 DIAGNOSIS — Z00.00 ROUTINE ADULT HEALTH MAINTENANCE: ICD-10-CM

## 2019-11-05 LAB
25(OH)D3 SERPL-MCNC: 33.4 NG/ML (ref 30–100)
ALBUMIN SERPL BCP-MCNC: 4.5 G/DL (ref 3–5.2)
ALP SERPL-CCNC: 49 U/L (ref 43–122)
ALT SERPL W P-5'-P-CCNC: 22 U/L (ref 9–52)
ANION GAP SERPL CALCULATED.3IONS-SCNC: 8 MMOL/L (ref 5–14)
AST SERPL W P-5'-P-CCNC: 21 U/L (ref 14–36)
BASOPHILS # BLD AUTO: 0.1 THOUSANDS/ΜL (ref 0–0.1)
BASOPHILS NFR BLD AUTO: 1 % (ref 0–1)
BILIRUB SERPL-MCNC: 0.6 MG/DL
BUN SERPL-MCNC: 9 MG/DL (ref 5–25)
CALCIUM SERPL-MCNC: 9.3 MG/DL (ref 8.4–10.2)
CHLORIDE SERPL-SCNC: 103 MMOL/L (ref 97–108)
CHOLEST SERPL-MCNC: 175 MG/DL
CO2 SERPL-SCNC: 29 MMOL/L (ref 22–30)
CREAT SERPL-MCNC: 0.67 MG/DL (ref 0.6–1.2)
EOSINOPHIL # BLD AUTO: 0.1 THOUSAND/ΜL (ref 0–0.4)
EOSINOPHIL NFR BLD AUTO: 1 % (ref 0–6)
ERYTHROCYTE [DISTWIDTH] IN BLOOD BY AUTOMATED COUNT: 12.1 %
GFR SERPL CREATININE-BSD FRML MDRD: 109 ML/MIN/1.73SQ M
GLUCOSE P FAST SERPL-MCNC: 94 MG/DL (ref 70–99)
HCG SERPL QL: NEGATIVE
HCT VFR BLD AUTO: 37.1 % (ref 36–46)
HDLC SERPL-MCNC: 41 MG/DL
HGB BLD-MCNC: 12.8 G/DL (ref 12–16)
LDLC SERPL CALC-MCNC: 117 MG/DL
LYMPHOCYTES # BLD AUTO: 2.5 THOUSANDS/ΜL (ref 0.5–4)
LYMPHOCYTES NFR BLD AUTO: 27 % (ref 25–45)
MCH RBC QN AUTO: 29.6 PG (ref 26–34)
MCHC RBC AUTO-ENTMCNC: 34.5 G/DL (ref 31–36)
MCV RBC AUTO: 86 FL (ref 80–100)
MONOCYTES # BLD AUTO: 0.5 THOUSAND/ΜL (ref 0.2–0.9)
MONOCYTES NFR BLD AUTO: 6 % (ref 1–10)
NEUTROPHILS # BLD AUTO: 6.1 THOUSANDS/ΜL (ref 1.8–7.8)
NEUTS SEG NFR BLD AUTO: 66 % (ref 45–65)
NONHDLC SERPL-MCNC: 134 MG/DL
PLATELET # BLD AUTO: 264 THOUSANDS/UL (ref 150–450)
PMV BLD AUTO: 9.7 FL (ref 8.9–12.7)
POTASSIUM SERPL-SCNC: 4.3 MMOL/L (ref 3.6–5)
PROT SERPL-MCNC: 7.6 G/DL (ref 5.9–8.4)
RBC # BLD AUTO: 4.32 MILLION/UL (ref 4–5.2)
SODIUM SERPL-SCNC: 140 MMOL/L (ref 137–147)
TRIGL SERPL-MCNC: 87 MG/DL
WBC # BLD AUTO: 9.3 THOUSAND/UL (ref 4.5–11)

## 2019-11-05 PROCEDURE — 82306 VITAMIN D 25 HYDROXY: CPT

## 2019-11-05 PROCEDURE — 36415 COLL VENOUS BLD VENIPUNCTURE: CPT

## 2019-11-05 PROCEDURE — 80053 COMPREHEN METABOLIC PANEL: CPT

## 2019-11-05 PROCEDURE — 85025 COMPLETE CBC W/AUTO DIFF WBC: CPT

## 2019-11-05 PROCEDURE — 84703 CHORIONIC GONADOTROPIN ASSAY: CPT

## 2019-11-05 PROCEDURE — 80061 LIPID PANEL: CPT

## 2019-11-05 NOTE — TELEPHONE ENCOUNTER
----- Message from Ady Jackson MD sent at 11/5/2019  1:37 PM EST -----  Please call the patient and reassure her that her pregnancy test is negative

## 2019-11-05 NOTE — TELEPHONE ENCOUNTER
I spoke to the patient  She reports she is 10 days late for her menses, and is asymptomatic  She took a pregnancy test that was negative  When she was cleaning out the garbage 6 hours later, pt noted the test appeared positive so she is very confused  Pt currently at [facility] lab getting blood work for her PCP  HCG qualitative added on to her blood work  Will call pt with results   Pt concerned as she has an IUD

## 2019-12-04 ENCOUNTER — OFFICE VISIT (OUTPATIENT)
Dept: CARDIOLOGY CLINIC | Facility: CLINIC | Age: 44
End: 2019-12-04
Payer: COMMERCIAL

## 2019-12-04 VITALS
SYSTOLIC BLOOD PRESSURE: 114 MMHG | DIASTOLIC BLOOD PRESSURE: 72 MMHG | BODY MASS INDEX: 26.37 KG/M2 | WEIGHT: 168 LBS | HEART RATE: 88 BPM | OXYGEN SATURATION: 100 % | HEIGHT: 67 IN

## 2019-12-04 DIAGNOSIS — R00.2 PALPITATIONS: Primary | ICD-10-CM

## 2019-12-04 DIAGNOSIS — R12 HEARTBURN: ICD-10-CM

## 2019-12-04 PROCEDURE — 99214 OFFICE O/P EST MOD 30 MIN: CPT | Performed by: INTERNAL MEDICINE

## 2019-12-04 RX ORDER — FAMOTIDINE 20 MG/1
20 TABLET, FILM COATED ORAL DAILY
Qty: 30 TABLET | Refills: 3 | Status: SHIPPED | OUTPATIENT
Start: 2019-12-04 | End: 2020-07-23

## 2019-12-04 NOTE — ASSESSMENT & PLAN NOTE
Palpitations secondary to premature ventricular contractions although there is no documented evidence  Exercise stress test and echocardiogram in 2018 were benign  Symptoms seem to be precipitated by fatty and protein rich foods  Recently checked electrolytes were normal     - am starting her on a course of Pepcid 20 mg daily to see if symptoms improve  If symptoms persist and are relating to food then would consider evaluation for gallbladder disease   - for now we will continue her current atenolol   - I am advising her to keep a diary of symptoms and call us and let us know if symptoms persist over the next few weeks in that case we will consider doing Holter monitor

## 2019-12-04 NOTE — PROGRESS NOTES
[facility]  [address] [facility], [place] [address]  Phone#  [phone]  Fax#  [phone]  *-*-*-*-*-*-*-*-*-*-*-*-*-*-*-*-*-*-*-*-*-*-*-*-*-*-*-*-*-*-*-*-*-*-*-*-*-*-*-*-*-*-*-*-*-*-*-*-*-*-*-*-*-*  ENCOUNTER DATE: 19 4:51 PM  PATIENT NAME: Buddy Gilman   1975    [id number]  Age: 45 y o  Sex: female  AUTHOR: Jose Antonio Logan MD  PRIMARYCARE PHYSICIAN: Cassandra Lorenz MD    DIAGNOSES:  1  Symptomatic premature ventricular contractions  2  History of  in the past  3  History of uterine implant  4  History of diverticulitis  Echocardiogram 2018:  - Normal left ventricular cavity size, wall thickness and systolic and diastolic function  EF approximately 60-65%  - No significant chamber hypertrophy or enlargement  - Mild aortic valve sclerosis, no aortic valve stenosis or regurgitation  - mild mitral valve leaflet sclerosis and early changes of mitral annular calcification, trace mitral and tricuspid valve regurgitation   - No obvious pulmonary hypertension   - No pericardial effusion  Exercise treadmill stress test in 2018:  Stress results: Duration of exercise was 10 min and 30 sec  Target heart rate was achieved  There was no chest pain during stress  IMPRESSIONS: 1  Exercise treadmill stress test is negative for ischemia at 91% of patient's age predicted maximal heart rate  2  Good exercise capacity  3  Normal resting blood pressure and appropriate blood pressure response to exercise  4  No chest pain reported with exercise  5  No significant arrhythmia noted with exercise  CURRENT ECG:  No results found for this visit on 19  CARDIOLOGY ASSESSMENT & PLAN:  1  Palpitations     2  Heartburn  famotidine (PEPCID) 20 mg tablet     Palpitations  Palpitations secondary to premature ventricular contractions although there is no documented evidence  Exercise stress test and echocardiogram in 2018 were benign    Symptoms seem to be precipitated by fatty and protein rich foods  Recently checked electrolytes were normal     - am starting her on a course of Pepcid 20 mg daily to see if symptoms improve  If symptoms persist and are relating to food then would consider evaluation for gallbladder disease   - for now we will continue her current atenolol   - I am advising her to keep a diary of symptoms and call us and let us know if symptoms persist over the next few weeks in that case we will consider doing Holter monitor  INTERVAL HISTORY & HISTORY OF PRESENT ILLNESS:  Mildred Shahid is here for follow-up regarding her cardiac comorbidities which include:  Symptomatic PVCs  Patient is here for follow-up  She mentions that she was overall feeling well until about 2 weeks back when following heavy meal she started to developed palpitations again  She felt the PVCs as strong extra heartbeats occurring frequently  Symptom was associated with fatigue but no definite dizziness  No presyncope/syncope  Is since that event 2 weeks back the following days also she experienced some symptoms mostly around eating food especially when it is heavy  Over the next week week and have symptoms are gradually improving but she still fees extra heartbeats occasionally  Other than this she was doing well with good exercise tolerance  Has had no orthopnea, PND or pedal edema  She continues to be active  REVIEW OF SYMPTOMS:    Positive for:  Intermittent palpitations felt as extra heartbeats, nausea, a GERD symptoms    Negative for: All remaining as reviewed below and in HPI      SYSTEM SYMPTOMS REVIEWED:  General--weight change, fever, night sweats  Respiratory--cough, wheezing, shortness of breath, sputum production  Cardiovascular--chest pain, syncope, dyspnea on exertion, edema, decline in exercise tolerance, claudication   Gastrointestinal--persistent vomiting, diarrhea, abdominal distention, blood in stool   Muscular or skeletal--joint pain or swelling   Neurologic--headaches, syncope, abnormal movement  Hematologic--history of easy bruising and bleeding   Endocrine--thyroid enlargement, heat or cold intolerance, polyuria   Psychiatric--anxiety, depression     *-*-*-*-*-*-*-*-*-*-*-*-*-*-*-*-*-*-*-*-*-*-*-*-*-*-*-*-*-*-*-*-*-*-*-*-*-*-*-*-*-*-*-*-*-*-*-*-*-*-*-*-*-*-  VITAL SIGNS:  Vitals:    12/04/19 1602   BP: 114/72   BP Location: Right arm   Patient Position: Sitting   Cuff Size: Adult   Pulse: 88   SpO2: 100%   Weight: 76 2 kg (168 lb)   Height: 5' 7" (1 702 m)     Weight (last 2 days)     Date/Time   Weight    12/04/19 1602   76 2 (168)           ,   Wt Readings from Last 3 Encounters:   12/04/19 76 2 kg (168 lb)   10/29/19 78 5 kg (173 lb)   02/04/19 75 8 kg (167 lb)    , Body mass index is 26 31 kg/m²  *-*-*-*-*-*-*-*-*-*-*-*-*-*-*-*-*-*-*-*-*-*-*-*-*-*-*-*-*-*-*-*-*-*-*-*-*-*-*-*-*-*-*-*-*-*-*-*-*-*-*-*-*-*-  PHYSICAL EXAM:  General Appearance:    Alert, cooperative, no distress, appears stated age   Head, Eyes, ENT:    No obvious abnormality, moist mucous mebranes  Neck:   Supple, no carotid bruit or JVD   Back:     Symmetric, no curvature  Lungs:     Respirations unlabored  Clear to auscultation bilaterally,    Chest wall:    No tenderness or deformity   Heart:    Regular rate and rhythm, S1 and S2 normal, no murmur, rub  or gallop     Abdomen:     Soft, non-tender, No obvious masses, or organomegaly   Extremities:   Extremities normal, no cyanosis or edema    Skin:   Skin color, texture, turgor normal, no rashes or lesions     *-*-*-*-*-*-*-*-*-*-*-*-*-*-*-*-*-*-*-*-*-*-*-*-*-*-*-*-*-*-*-*-*-*-*-*-*-*-*-*-*-*-*-*-*-*-*-*-*-*-*-*-*-*-  CURRENT MEDICATION LIST:    Current Outpatient Medications:     atenolol (TENORMIN) 25 mg tablet, Take 1 tablet (25 mg total) by mouth daily, Disp: 90 tablet, Rfl: 6    multivitamin (THERAGRAN) TABS, Take 1 tablet by mouth, Disp: , Rfl:     PARAGARD INTRAUTERINE COPPER IUD, by Intrauterine route, Disp: , Rfl:     Vitamin D, Cholecalciferol, 1000 units CAPS, Take 1,000 Units by mouth, Disp: , Rfl:     famotidine (PEPCID) 20 mg tablet, Take 1 tablet (20 mg total) by mouth daily, Disp: 30 tablet, Rfl: 3    ALLERGIES:  No Known Allergies    *-*-*-*-*-*-*-*-*-*-*-*-*-*-*-*-*-*-*-*-*-*-*-*-*-*-*-*-*-*-*-*-*-*-*-*-*-*-*-*-*-*-*-*-*-*-*-*-*-*-*-*-*-*-  The LABORATORY DATA:  I have personally reviewed pertinent labs  Potassium   Date Value Ref Range Status   11/05/2019 4 3 3 6 - 5 0 mmol/L Final     Chloride   Date Value Ref Range Status   11/05/2019 103 97 - 108 mmol/L Final     CO2   Date Value Ref Range Status   11/05/2019 29 22 - 30 mmol/L Final     BUN   Date Value Ref Range Status   11/05/2019 9 5 - 25 mg/dL Final     Creatinine   Date Value Ref Range Status   11/05/2019 0 67 0 60 - 1 20 mg/dL Final     Comment:     Standardized to IDMS reference method     eGFR   Date Value Ref Range Status   11/05/2019 109 >60 ml/min/1 73sq m Final     Calcium   Date Value Ref Range Status   11/05/2019 9 3 8 4 - 10 2 mg/dL Final     AST   Date Value Ref Range Status   11/05/2019 21 14 - 36 U/L Final     Comment:       Specimen collection should occur prior to Sulfasalazine administration due to the potential for falsely depressed results  ALT   Date Value Ref Range Status   11/05/2019 22 9 - 52 U/L Final     Comment:       Specimen collection should occur prior to Sulfasalazine administration due to the potential for falsely depressed results        Alkaline Phosphatase   Date Value Ref Range Status   11/05/2019 49 43 - 122 U/L Final     WBC   Date Value Ref Range Status   11/05/2019 9 30 4 50 - 11 00 Thousand/uL Final     Hemoglobin   Date Value Ref Range Status   11/05/2019 12 8 12 0 - 16 0 g/dL Final     Platelets   Date Value Ref Range Status   11/05/2019 264 150 - 450 Thousands/uL Final     No results found for: PT, PTT, INR  No results found for: CKMB, DIGOXIN  No results found for: TSH  HDL, Direct   Date Value Ref Range Status   2019 41 >=40 mg/dL Final     Comment:       HDL Cholesterol:       Low     <41 mg/dL  Specimen collection should occur prior to Metamizole administration due to the potential for falsley depressed results  Triglycerides   Date Value Ref Range Status   2019 87 <150 mg/dL Final     Comment:       Triglyceride:     Normal          <150 mg/dl     Borderline High 150-199 mg/dl     High            200-499 mg/dl        Very High       >499 mg/dl    Specimen collection should occur prior to N-Acetylcysteine or Metamizole administration due to the potential for falsely depressed results  No results found for: HGBA1C  No results found for: [place] Beat, GRAMSTAIN, URINECX, WOUNDCULT, BODYFLUIDCUL, MRSACULTURE, INFLUAPCR, INFLUBPCR, RSVPCR, LEGIONELLAUR, CDIFFTOXINB    *-*-*-*-*-*-*-*-*-*-*-*-*-*-*-*-*-*-*-*-*-*-*-*-*-*-*-*-*-*-*-*-*-*-*-*-*-*-*-*-*-*-*-*-*-*-*-*-*-*-*-*-*-*-  PREVIOUS CARDIOLOGY & RADIOLOGY RESULTS:  Results for orders placed during the hospital encounter of 18   Echo complete with contrast if indicated    [address]    Transthoracic Echocardiogram  2D, M-mode, Doppler, and Color Doppler    Study date:  2018    Patient: Viky Mejia  MR number: [id number]  Account number: [de-identified]  : 1975  Age: 41 years  Gender: Female  Status: Outpatient  Location: [address] [place] [address]  Height: 67 in  Weight: 160 lb  BP: 108/ 70 mmHg    Indications: Tachycardia, palpitations    Diagnoses: R00 0 - Tachycardia, unspecified    Sonographer:  CHELSEA Potts,RCS  Primary Physician:  Elberta Sicard, MD  Referring Physician:  Yaron Villarreal MD  [facility]  Interpreting Physician:  Yaron Villarreal MD    IMPRESSIONS:  - Normal left ventricular cavity size, wall thickness and systolic and diastolic function  EF approximately 60-65%    - No significant chamber hypertrophy or enlargement  - Mild aortic valve sclerosis, no aortic valve stenosis or regurgitation  - mild mitral valve leaflet sclerosis and early changes of mitral annular calcification, trace mitral and tricuspid valve regurgitation   - No obvious pulmonary hypertension   - No pericardial effusion  No previous echocardiogram is available for comparison  SUMMARY    LEFT VENTRICLE:  Normal left ventricular cavity size, normal wall thickness, normal left ventricle systolic function and wall motion  Ejection fraction is estimated as around 60-65%  Normal diastolic function  RIGHT VENTRICLE:  Normal right ventricular size and systolic function  Normal estimated right ventricular systolic pressure  LEFT ATRIUM:  Normal left atrial cavity size  Intact interatrial septum  RIGHT ATRIUM:  Normal right atrial cavity size  MITRAL VALVE:  Mild mitral valve leaflet sclerosis, early changes of mitral annular calcification  Trace mitral valve regurgitation  AORTIC VALVE:  Tricuspid aortic valve with mild sclerosis  No aortic valve stenosis or regurgitation  TRICUSPID VALVE:  Trace tricuspid valve regurgitation  PULMONIC VALVE:  No significant pulmonic valve regurgitation  AORTA:  Aortic root and proximal ascending aorta are normal in size on 2D imaging  IVC, HEPATIC VEINS:  Inferior vena cava is normal in size and demonstrates appropriate respiratory phasic changes in diameter  PERICARDIUM:  No pericardial effusion  HISTORY: PRIOR HISTORY: Palpitations, former smoker    PROCEDURE: The study was performed in the [employer]  This was a routine study  The transthoracic approach was used  The study included complete 2D imaging, M-mode, complete spectral Doppler, and color Doppler  The heart rate was  72 bpm, at the start of the study  Images were obtained from the parasternal, apical, subcostal, and suprasternal notch acoustic windows  Image quality was adequate      LEFT VENTRICLE: Normal left ventricular cavity size, normal wall thickness, normal left ventricle systolic function and wall motion  Ejection fraction is estimated as around 60-65%  Normal diastolic function  RIGHT VENTRICLE: Normal right ventricular size and systolic function  Normal estimated right ventricular systolic pressure  LEFT ATRIUM: Normal left atrial cavity size  Intact interatrial septum  RIGHT ATRIUM: Normal right atrial cavity size  MITRAL VALVE: Mild mitral valve leaflet sclerosis, early changes of mitral annular calcification  Trace mitral valve regurgitation  AORTIC VALVE: Tricuspid aortic valve with mild sclerosis  No aortic valve stenosis or regurgitation  TRICUSPID VALVE: Trace tricuspid valve regurgitation  PULMONIC VALVE: No significant pulmonic valve regurgitation  PERICARDIUM: No pericardial effusion  AORTA: Aortic root and proximal ascending aorta are normal in size on 2D imaging  SYSTEMIC VEINS: IVC: Inferior vena cava is normal in size and demonstrates appropriate respiratory phasic changes in diameter      SYSTEM MEASUREMENT TABLES    2D  %FS: 32 43 %  Ao Diam: 2 76 cm  EDV(Teich): 84 73 ml  EF(Teich): 61 01 %  ESV(Teich): 33 04 ml  IVSd: 0 93 cm  LA Area: 14 46 cm2  LA Diam: 2 87 cm  LVEDV MOD A4C: 98 34 ml  LVEF MOD A4C: 64 89 %  LVESV MOD A4C: 34 52 ml  LVIDd: 4 34 cm  LVIDs: 2 93 cm  LVLd A4C: 8 36 cm  LVLs A4C: 6 8 cm  LVPWd: 0 86 cm  RA Area: 9 46 cm2  RVIDd: 3 02 cm  SV MOD A4C: 63 81 ml  SV(Teich): 51 7 ml    CW  AV Vmax: 1 62 m/s  AV maxPG: 10 44 mmHg  RAP: 10 mmHg  TR Vmax: 2 27 m/s  TR maxP 6 mmHg    PW  E': 0 15 m/s  E' Sept: 0 15 m/s  E/E': 5 13  E/E' Sept: 5 05  LVOT Vmax: 0 96 m/s  LVOT maxPG: 3 69 mmHg  MV A Ramsey: 0 65 m/s  MV Dec [place]: 3 72 m/s2  MV DecT: 207 9 ms  MV E Ramsey: 0 77 m/s  MV E/A Ratio: 1 19  MV PHT: 60 29 ms  MVA By PHT: 3 65 cm2  PAEDP: 15 56 mmHg  PRend P 56 mmHg  PRend Vmax: 1 18 m/s  RVSP: 30 6 mmHg    [employer] Accredited Echocardiography [facility]    Prepared and electronically signed by    Donaldo Browning MD  Signed [id number] 10:30:11       No results found for this or any previous visit  No results found for this or any previous visit  No results found for this or any previous visit  XR shoulder 2+ vw left  Narrative: LEFT SHOULDER    INDICATION:   M25 512: Pain in left shoulder  COMPARISON:  None    VIEWS:  XR SHOULDER 2+ VW LEFT     FINDINGS:    There is no acute fracture or dislocation  No significant degenerative changes  No lytic or blastic lesions are seen  Soft tissues are unremarkable  Impression: No acute osseous abnormality      Workstation performed: [id number]        *-*-*-*-*-*-*-*-*-*-*-*-*-*-*-*-*-*-*-*-*-*-*-*-*-*-*-*-*-*-*-*-*-*-*-*-*-*-*-*-*-*-*-*-*-*-*-*-*-*-*-*-*-*-  SIGNATURES:   @BAB@   Donaldo Browning MD     *-*-*-*-*-*-*-*-*-*-*-*-*-*-*-*-*-*-*-*-*-*-*-*-*-*-*-*-*-*-*-*-*-*-*-*-*-*-*-*-*-*-*-*-*-*-*-*-*-*-*-*-*-*-    Social History     Socioeconomic History    Marital status: /Civil Union     Spouse name: Laquita Sawant Number of children: 4    Years of education: HS    Highest education level: Not on file   Occupational History    Occupation: unemployed   Social Needs    Financial resource strain: Not on file    Food insecurity:     Worry: Not on file     Inability: Not on file   ePatientFinder needs:     Medical: Not on file     Non-medical: Not on file   Tobacco Use    Smoking status: Former Smoker    Smokeless tobacco: Former User     Quit date: 5/15/2016    Tobacco comment: Hooka   Substance and Sexual Activity    Alcohol use: No    Drug use: No    Sexual activity: Yes     Partners: Male     Birth control/protection: IUD     Comment: Lifetime patners; 1   Lifestyle    Physical activity:     Days per week: Not on file     Minutes per session: Not on file    Stress: Not on file   Relationships    Social connections:     Talks on phone: Not on file     Gets together: Not on file     Attends Alevism service: Not on file     Active member of club or organization: Not on file     Attends meetings of clubs or organizations: Not on file     Relationship status: Not on file    Intimate partner violence:     Fear of current or ex partner: Not on file     Emotionally abused: Not on file     Physically abused: Not on file     Forced sexual activity: Not on file   Other Topics Concern    Not on file   Social History Narrative    Caodaism: Buddhism    Accepts blood products        Exercise: 5x/week x 30 min    Calcium: Multivitamin daily, 1 c milk daily      Family History   Problem Relation Age of Onset    Brain cancer Maternal Grandmother     Breast cancer Maternal Aunt 79    Uterine cancer Mother     Diabetes Mother     Hypertension Mother     No Known Problems Father     Fibroids Sister     Other Sister         bilateral hip replacement    No Known Problems Brother     Liver disease Sister         cyst on liver    No Known Problems Sister     No Known Problems Sister     No Known Problems Sister     No Known Problems Brother     Lung disease Brother     Hypertension Brother     No Known Problems Brother     Other Brother         palpitations    No Known Problems Half-Brother     Lung cancer Maternal Uncle     Ovarian cancer Neg Hx     Colon cancer Neg Hx      Past Surgical History:   Procedure Laterality Date     SECTION      x4    MOUTH SURGERY      implant of tooth x 2    OR COLONOSCOPY FLX DX W/COLLJ SPEC WHEN PFRMD N/A 5/15/2017    Procedure: COLONOSCOPY;  Surgeon: Stefany Pozo MD;  Location: [facility];   Service: Gastroenterology    WISDOM TOOTH EXTRACTION

## 2019-12-04 NOTE — PATIENT INSTRUCTIONS
CARDIOLOGY ASSESSMENT & PLAN:  1  Palpitations       Palpitations  Palpitations secondary to premature ventricular contractions although there is no documented evidence  Exercise stress test and echocardiogram in 2018 were benign  Symptoms seem to be precipitated by fatty and protein rich foods  Recently checked electrolytes were normal     - am starting her on a course of Pepcid 20 mg daily to see if symptoms improve  If symptoms persist and are relating to food then would consider evaluation for gallbladder disease   - for now we will continue her current atenolol   - I am advising her to keep a diary of symptoms and call us and let us know if symptoms persist over the next few weeks in that case we will consider doing Holter monitor  DIAGNOSES:  1  Symptomatic premature ventricular contractions  2  History of  in the past  3  History of uterine implant  4  History of diverticulitis  Echocardiogram 2018:  - Normal left ventricular cavity size, wall thickness and systolic and diastolic function  EF approximately 60-65%  - No significant chamber hypertrophy or enlargement  - Mild aortic valve sclerosis, no aortic valve stenosis or regurgitation  - mild mitral valve leaflet sclerosis and early changes of mitral annular calcification, trace mitral and tricuspid valve regurgitation   - No obvious pulmonary hypertension   - No pericardial effusion  Exercise treadmill stress test in 2018:  Stress results: Duration of exercise was 10 min and 30 sec  Target heart rate was achieved  There was no chest pain during stress  IMPRESSIONS: 1  Exercise treadmill stress test is negative for ischemia at 91% of patient's age predicted maximal heart rate  2  Good exercise capacity  3  Normal resting blood pressure and appropriate blood pressure response to exercise  4  No chest pain reported with exercise  5  No significant arrhythmia noted with exercise

## 2019-12-23 ENCOUNTER — HOSPITAL ENCOUNTER (OUTPATIENT)
Dept: MAMMOGRAPHY | Facility: CLINIC | Age: 44
Discharge: HOME | End: 2019-12-23
Payer: COMMERCIAL

## 2019-12-23 VITALS — HEIGHT: 66 IN | WEIGHT: 168 LBS | BODY MASS INDEX: 27 KG/M2

## 2019-12-23 DIAGNOSIS — Z12.31 VISIT FOR SCREENING MAMMOGRAM: ICD-10-CM

## 2019-12-23 PROCEDURE — 77067 SCR MAMMO BI INCL CAD: CPT

## 2019-12-23 PROCEDURE — 77063 BREAST TOMOSYNTHESIS BI: CPT

## 2020-01-02 ENCOUNTER — ANNUAL EXAM (OUTPATIENT)
Dept: OBGYN CLINIC | Facility: CLINIC | Age: 45
End: 2020-01-02
Payer: COMMERCIAL

## 2020-01-02 VITALS
BODY MASS INDEX: 32.91 KG/M2 | DIASTOLIC BLOOD PRESSURE: 72 MMHG | WEIGHT: 174.3 LBS | HEIGHT: 61 IN | SYSTOLIC BLOOD PRESSURE: 116 MMHG

## 2020-01-02 DIAGNOSIS — E66.9 OBESITY, CLASS I, BMI 30-34.9: ICD-10-CM

## 2020-01-02 DIAGNOSIS — Z12.31 VISIT FOR SCREENING MAMMOGRAM: ICD-10-CM

## 2020-01-02 DIAGNOSIS — Z01.419 ENCOUNTER FOR ANNUAL ROUTINE GYNECOLOGICAL EXAMINATION: Primary | ICD-10-CM

## 2020-01-02 DIAGNOSIS — E58 DIETARY CALCIUM DEFICIENCY: ICD-10-CM

## 2020-01-02 DIAGNOSIS — Z30.431 CONTRACEPTIVE, SURVEILLANCE, INTRAUTERINE DEVICE: ICD-10-CM

## 2020-01-02 PROBLEM — E66.811 OBESITY, CLASS I, BMI 30-34.9: Status: ACTIVE | Noted: 2020-01-02

## 2020-01-02 PROBLEM — R00.2 HEART PALPITATIONS: Status: ACTIVE | Noted: 2020-01-02

## 2020-01-02 PROCEDURE — S0612 ANNUAL GYNECOLOGICAL EXAMINA: HCPCS | Performed by: OBSTETRICS & GYNECOLOGY

## 2020-01-02 NOTE — PROGRESS NOTES
Pt is a 45 y o  [id number] with Patient's last menstrual period was 2019 (approximate)  using Paragard for [facility] presents for preventive care  She notes the same partner since her last STI evaluation  In her lifetime she has been involved with 1 partner   Safe sexual practices (monogomy, condoms) are followed consistently  · She does  feel safe in the relationship  She does feel safe in her home  · Her calcium intake encompasses multivitamin, milk (cow, goat, almond, cashew, soy, etc) and yogurt for a total of 3-4 servings daily on average  She does take additional Vitamin D (MVI or supplement)  · She exercises 4-5 times per week  · Her menses occur every 28 Days, last 8-10 days and require panty liner for 3 days followed by 4 days of regular pad every 4 hours followed by pantyliner every 6-7 hours  Menstrual History:  OB History        4    Para   4    Term   4            AB        Living   4       SAB        TAB        Ectopic        Multiple        Live Births               Obstetric Comments   FT C/S x 4    Menarche: 14    28/8-10/3 days spotting, 4 days menses, 3 days spotting  On 4 days of menses regular pad every 4 hours            Menarche age: 17  Patient's last menstrual period was 2019 (approximate)  ·      · She has never recieved an HPV vaccine and does not desire to begin or continue the HPV vaccination series    · tobacco use : does not use tobacco              · Colonoscopy: -diverticulosis, repeat   · Pap: 2018-wnl, HRHPV neg  · Mammogram: 3D 2019-wnl, Repeat rx for 1 year given    Past Medical History:   Diagnosis Date    Diverticulitis     Palpitations     Varicella vaccination        Past Surgical History:   Procedure Laterality Date     SECTION      x4    MOUTH SURGERY      implant of tooth x 2    UT COLONOSCOPY FLX DX W/COLLJ SPEC WHEN PFRMD N/A 5/15/2017    Procedure: COLONOSCOPY;  Surgeon: Gustavo Raygoza MD;  Location: [facility]; Service: Gastroenterology    WISDOM TOOTH EXTRACTION         OB History    Para Term  AB Living   4 4 4     4   SAB TAB Ectopic Multiple Live Births                  # Outcome Date GA Lbr Isaias/2nd Weight Sex Delivery Anes PTL Lv   4 Term            3 Term            2 Term            1 Term               Obstetric Comments   FT C/S x 4      Menarche: 14      28/8-10/3 days spotting, 4 days menses, 3 days spotting   On 4 days of menses regular pad every 4 hours            Current Outpatient Medications:     atenolol (TENORMIN) 25 mg tablet, Take 1 tablet (25 mg total) by mouth daily, Disp: 90 tablet, Rfl: 6    famotidine (PEPCID) 20 mg tablet, Take 1 tablet (20 mg total) by mouth daily, Disp: 30 tablet, Rfl: 3    multivitamin (THERAGRAN) TABS, Take 1 tablet by mouth, Disp: , Rfl:     PARAGARD INTRAUTERINE COPPER IUD, by Intrauterine route, Disp: , Rfl:     Vitamin D, Cholecalciferol, 1000 units CAPS, Take 1,000 Units by mouth, Disp: , Rfl:     No Known Allergies    Social History     Socioeconomic History    Marital status: /Civil Union     Spouse name: Broderick Boast Number of children: 4    Years of education: HS    Highest education level: None   Occupational History    Occupation: unemployed   Social Needs    Financial resource strain: None    Food insecurity:     Worry: None     Inability: None    Transportation needs:     Medical: None     Non-medical: None   Tobacco Use    Smoking status: Former Smoker    Smokeless tobacco: Never Used    Tobacco comment: Hooka   Substance and Sexual Activity    Alcohol use: No    Drug use: No    Sexual activity: Yes     Partners: Male     Birth control/protection: IUD     Comment: Lifetime patners; 1   Lifestyle    Physical activity:     Days per week: None     Minutes per session: None    Stress: None   Relationships    Social connections:     Talks on phone: None     Gets together: None     Attends Caodaism service: None Active member of club or organization: None     Attends meetings of clubs or organizations: None     Relationship status: None    Intimate partner violence:     Fear of current or ex partner: None     Emotionally abused: None     Physically abused: None     Forced sexual activity: None   Other Topics Concern    None   Social History Narrative    Yazdanism: Buddhism    Accepts blood products        Exercise: 5x/week x 30 min    Calcium: Multivitamin daily, 1 c almond milk daily, 1 yogurt 3-4x/week        Family History   Problem Relation Age of Onset    Brain cancer Maternal Grandmother     Breast cancer Maternal Aunt 79    Uterine cancer Mother     Diabetes Mother     Hypertension Mother     No Known Problems Father     Fibroids Sister     Other Sister         bilateral hip replacement    No Known Problems Brother     Liver disease Sister         cyst on liver    No Known Problems Sister     No Known Problems Sister     No Known Problems Sister     No Known Problems Brother     Lung disease Brother     Hypertension Brother     No Known Problems Brother     Other Brother         palpitations    No Known Problems Half-Brother     Lung cancer Maternal Uncle     No Known Problems Daughter     No Known Problems Maternal Grandfather     Hypertension Paternal Grandmother     Diabetes Paternal Grandmother     No Known Problems Paternal Grandfather     No Known Problems Daughter     No Known Problems Daughter     Fibroids Maternal Aunt 40        hysterectomy    Ovarian cancer Neg Hx     Colon cancer Neg Hx        Blood pressure 116/72, height 5' 0 65" (1 541 m), weight 79 1 kg (174 lb 4 8 oz), last menstrual period 12/06/2019  and Body mass index is 33 31 kg/m²  Physical Exam   Constitutional: She is oriented to person, place, and time  She appears well-developed and well-nourished  HENT:   Head: Normocephalic and atraumatic     Eyes: Conjunctivae and EOM are normal    Neck: Normal range of motion  Neck supple  No tracheal deviation present  No thyromegaly present  Cardiovascular: Normal rate, regular rhythm and normal heart sounds  Pulmonary/Chest: Effort normal and breath sounds normal  No stridor  No respiratory distress  She has no wheezes  She has no rales  Abdominal: Soft  Bowel sounds are normal  She exhibits no distension and no mass  There is no tenderness  There is no rebound and no guarding  Musculoskeletal: Normal range of motion  She exhibits no edema or tenderness  Lymphadenopathy:     She has no cervical adenopathy  Neurological: She is alert and oriented to person, place, and time  Skin: Skin is warm  No rash noted  No erythema  Psychiatric: She has a normal mood and affect  Her behavior is normal  Judgment and thought content normal        Breasts: breasts appear normal, no suspicious masses, no skin or nipple changes or axillary nodes, symmetric fibrous changes in both upper outer quadrants  vulva: normal external genitalia for age and no lesions, masses, epithelial changes, or exudate  vagina: color pink, rugae  well formed rugae and bleeding  scant amount  cervix: parous, no lesions  and  IUD string  3 cm  uterus: NSSC, AF, NT, mobile  adnexa: no masses or tenderness      A/P:  Pt is a 45 y o  [id number] with      Diagnoses and all orders for this visit:    Encounter for annual routine gynecological examination    Visit for screening mammogram  -     Mammo screening bilateral w 3d & cad; Future    Contraceptive, surveillance, intrauterine device  -iud strings visualized    Dietary calcium deficiency  Patient advised recommendation of daily dietary calcium of  1000 mg calcium  Obesity, Class I, BMI 30-34 9  Patient advised recommendation of BMI to be between 19-25

## 2020-01-22 ENCOUNTER — HOSPITAL ENCOUNTER (OUTPATIENT)
Dept: RADIOLOGY | Facility: HOSPITAL | Age: 45
Discharge: HOME | End: 2020-01-22
Payer: COMMERCIAL

## 2020-01-22 ENCOUNTER — TRANSCRIBE ORDERS (OUTPATIENT)
Dept: ADMINISTRATIVE | Facility: HOSPITAL | Age: 45
End: 2020-01-22

## 2020-01-22 DIAGNOSIS — R76.11 NONSPECIFIC REACTION TO TUBERCULIN TEST: Primary | ICD-10-CM

## 2020-01-22 DIAGNOSIS — R76.11 NONSPECIFIC REACTION TO TUBERCULIN TEST: ICD-10-CM

## 2020-01-22 PROCEDURE — 71046 X-RAY EXAM CHEST 2 VIEWS: CPT

## 2020-02-19 DIAGNOSIS — I49.3 PVC'S (PREMATURE VENTRICULAR CONTRACTIONS): ICD-10-CM

## 2020-02-19 DIAGNOSIS — R00.2 PALPITATIONS: ICD-10-CM

## 2020-02-19 RX ORDER — ATENOLOL 25 MG/1
TABLET ORAL
Qty: 90 TABLET | Refills: 5 | Status: SHIPPED | OUTPATIENT
Start: 2020-02-19 | End: 2021-04-16 | Stop reason: SDUPTHER

## 2020-07-23 ENCOUNTER — OFFICE VISIT (OUTPATIENT)
Dept: OBGYN CLINIC | Facility: CLINIC | Age: 45
End: 2020-07-23
Payer: COMMERCIAL

## 2020-07-23 VITALS
HEART RATE: 74 BPM | SYSTOLIC BLOOD PRESSURE: 120 MMHG | DIASTOLIC BLOOD PRESSURE: 80 MMHG | BODY MASS INDEX: 26.15 KG/M2 | WEIGHT: 162 LBS | TEMPERATURE: 98.1 F

## 2020-07-23 DIAGNOSIS — N92.1 MENORRHAGIA WITH IRREGULAR CYCLE: Primary | ICD-10-CM

## 2020-07-23 DIAGNOSIS — Z30.431 CONTRACEPTIVE, SURVEILLANCE, INTRAUTERINE DEVICE: ICD-10-CM

## 2020-07-23 PROCEDURE — 99214 OFFICE O/P EST MOD 30 MIN: CPT | Performed by: NURSE PRACTITIONER

## 2020-07-23 PROCEDURE — 1036F TOBACCO NON-USER: CPT | Performed by: NURSE PRACTITIONER

## 2020-07-23 RX ORDER — AMOXICILLIN AND CLAVULANATE POTASSIUM 875; 125 MG/1; MG/1
TABLET, FILM COATED ORAL
COMMUNITY
Start: 2020-07-07 | End: 2020-07-23

## 2020-07-23 NOTE — PROGRESS NOTES
Assessment/Plan:    1  Menorrhagia with irregular cycle  Check lab work and pelvic US  Possible endometrial biopsy  RV 2-3 w for review of findings/ management     - US pelvis complete non OB; Future  - TSH, 3rd generation with Free T4 reflex; Future  - CBC and differential; Future  - Follicle stimulating hormone; Future  - Estradiol; Future  - hCG, quantitative; Future    2  Contraceptive, surveillance, intrauterine device  Normal IUD check      Subjective:      Patient ID: Dylan Woo is a 39 y o  female  HPI   PROBLEM VISIT  CC: menorrhagia w/ irregular cycle    38 yo  presents for evaluation of heavy, elongated and irregular bleeding  She states that she has had a paraguard IUD for 7 years  Over the past 3 years her periods have become irregular  More recently progressively worse  Bleeding will last 13 days, it'll reoccur 1-2 wks later  Flow can be very heavy with clots, x 4 days  See ROS    Seen last 2020 by SB for yearly    The following portions of the patient's history were reviewed and updated as appropriate: allergies, current medications, past family history, past medical history, past social history, past surgical history and problem list     Review of Systems   Constitutional: Negative for chills, fatigue and fever  Respiratory: Negative for cough and shortness of breath  Cardiovascular: Positive for palpitations (known hx of )  Negative for chest pain  Gastrointestinal: Negative for abdominal distention, abdominal pain, constipation, diarrhea, nausea and vomiting  Diverticulitis -- treated with antibiotic   Genitourinary: Positive for menstrual problem (irregular, heavy; cramps w/ menses) and vaginal bleeding  Negative for difficulty urinating, dysuria, frequency, genital sores, pelvic pain, urgency and vaginal discharge  Musculoskeletal: Negative for arthralgias and myalgias  Neurological: Negative for dizziness, syncope, light-headedness and headaches  Psychiatric/Behavioral: Negative for agitation, dysphoric mood and sleep disturbance  The patient is not nervous/anxious  Objective:    /80 (BP Location: Left arm, Patient Position: Sitting, Cuff Size: Standard)   Pulse 74   Temp 98 1 °F (36 7 °C) (Tympanic)   Wt 73 5 kg (162 lb)   LMP 07/22/2020 (Exact Date)   BMI 26 15 kg/m²      Physical Exam   Constitutional: She is oriented to person, place, and time  She appears well-developed and well-nourished  No distress  HENT:   Head: Normocephalic and atraumatic  Eyes: Pupils are equal, round, and reactive to light  Pulmonary/Chest: Effort normal    Abdominal: Soft  Genitourinary: Pelvic exam was performed with patient supine  There is no rash, tenderness, lesion or injury on the right labia  There is no rash, tenderness, lesion or injury on the left labia  Uterus is not enlarged and not tender  Cervix exhibits no motion tenderness, no discharge and no friability  Right adnexum displays no mass and no tenderness  Left adnexum displays no mass and no tenderness  There is bleeding (scant) in the vagina  No erythema or tenderness in the vagina  No foreign body in the vagina  No signs of injury around the vagina  No vaginal discharge found  Lymphadenopathy:        Right: No inguinal adenopathy present  Left: No inguinal adenopathy present  Neurological: She is alert and oriented to person, place, and time  Psychiatric: She has a normal mood and affect   Her behavior is normal  Thought content normal

## 2020-07-25 ENCOUNTER — HOSPITAL ENCOUNTER (OUTPATIENT)
Dept: ULTRASOUND IMAGING | Facility: HOSPITAL | Age: 45
Discharge: HOME | End: 2020-07-25
Payer: COMMERCIAL

## 2020-07-25 DIAGNOSIS — N92.1 MENORRHAGIA WITH IRREGULAR CYCLE: ICD-10-CM

## 2020-07-25 PROCEDURE — 76856 US EXAM PELVIC COMPLETE: CPT

## 2020-07-25 PROCEDURE — 76830 TRANSVAGINAL US NON-OB: CPT

## 2020-07-27 ENCOUNTER — APPOINTMENT (OUTPATIENT)
Dept: LAB | Facility: HOSPITAL | Age: 45
End: 2020-07-27
Payer: COMMERCIAL

## 2020-07-27 DIAGNOSIS — N92.1 MENORRHAGIA WITH IRREGULAR CYCLE: ICD-10-CM

## 2020-07-27 DIAGNOSIS — N92.1 MENORRHAGIA WITH IRREGULAR CYCLE: Primary | ICD-10-CM

## 2020-07-27 LAB
B-HCG SERPL-ACNC: <3 MIU/ML
BASOPHILS # BLD AUTO: 0.1 THOUSANDS/ΜL (ref 0–0.1)
BASOPHILS NFR BLD AUTO: 1 % (ref 0–1)
EOSINOPHIL # BLD AUTO: 0.2 THOUSAND/ΜL (ref 0–0.4)
EOSINOPHIL NFR BLD AUTO: 3 % (ref 0–6)
ERYTHROCYTE [DISTWIDTH] IN BLOOD BY AUTOMATED COUNT: 12.7 %
ESTRADIOL SERPL-MCNC: 19 PG/ML
FSH SERPL-ACNC: 37.9 MIU/ML
HCT VFR BLD AUTO: 37.9 % (ref 36–46)
HGB BLD-MCNC: 12.7 G/DL (ref 12–16)
LYMPHOCYTES # BLD AUTO: 2.3 THOUSANDS/ΜL (ref 0.5–4)
LYMPHOCYTES NFR BLD AUTO: 29 % (ref 25–45)
MCH RBC QN AUTO: 28.7 PG (ref 26–34)
MCHC RBC AUTO-ENTMCNC: 33.6 G/DL (ref 31–36)
MCV RBC AUTO: 86 FL (ref 80–100)
MONOCYTES # BLD AUTO: 0.6 THOUSAND/ΜL (ref 0.2–0.9)
MONOCYTES NFR BLD AUTO: 7 % (ref 1–10)
NEUTROPHILS # BLD AUTO: 4.9 THOUSANDS/ΜL (ref 1.8–7.8)
NEUTS SEG NFR BLD AUTO: 61 % (ref 45–65)
PLATELET # BLD AUTO: 261 THOUSANDS/UL (ref 150–450)
PMV BLD AUTO: 9.4 FL (ref 8.9–12.7)
RBC # BLD AUTO: 4.43 MILLION/UL (ref 4–5.2)
TSH SERPL DL<=0.05 MIU/L-ACNC: 1.27 UIU/ML (ref 0.47–4.68)
WBC # BLD AUTO: 8.1 THOUSAND/UL (ref 4.5–11)

## 2020-07-27 PROCEDURE — 82670 ASSAY OF TOTAL ESTRADIOL: CPT

## 2020-07-27 PROCEDURE — 36415 COLL VENOUS BLD VENIPUNCTURE: CPT

## 2020-07-27 PROCEDURE — 84702 CHORIONIC GONADOTROPIN TEST: CPT

## 2020-07-27 PROCEDURE — 85025 COMPLETE CBC W/AUTO DIFF WBC: CPT

## 2020-07-27 PROCEDURE — 84443 ASSAY THYROID STIM HORMONE: CPT

## 2020-07-27 PROCEDURE — 83001 ASSAY OF GONADOTROPIN (FSH): CPT

## 2020-07-29 ENCOUNTER — TELEPHONE (OUTPATIENT)
Dept: OBGYN CLINIC | Facility: CLINIC | Age: 45
End: 2020-07-29

## 2020-07-29 NOTE — TELEPHONE ENCOUNTER
LMOM with detailed message, patient should call office back to schedule a follow up ultrasound and endometrial biopsy

## 2020-07-29 NOTE — TELEPHONE ENCOUNTER
----- Message from Bozena Robert 35 sent at 7/27/2020 10:30 AM EDT -----  Please inform patient of ultrasound findings  IUD is in place  Bilateral ovarian cysts for which we recommend a repeat US in 8-12 wks to check for resolution  In terms of her abnormal bleeding, I would recommend endometrial sampling and discussion regarding management of bleeding

## 2020-08-04 ENCOUNTER — PROCEDURE VISIT (OUTPATIENT)
Dept: OBGYN CLINIC | Facility: CLINIC | Age: 45
End: 2020-08-04
Payer: COMMERCIAL

## 2020-08-04 VITALS
DIASTOLIC BLOOD PRESSURE: 58 MMHG | TEMPERATURE: 98.8 F | SYSTOLIC BLOOD PRESSURE: 100 MMHG | BODY MASS INDEX: 26.21 KG/M2 | HEART RATE: 81 BPM | WEIGHT: 162.4 LBS | OXYGEN SATURATION: 99 %

## 2020-08-04 DIAGNOSIS — N83.202 BILATERAL OVARIAN CYSTS: ICD-10-CM

## 2020-08-04 DIAGNOSIS — N83.201 BILATERAL OVARIAN CYSTS: ICD-10-CM

## 2020-08-04 DIAGNOSIS — N92.1 MENORRHAGIA WITH IRREGULAR CYCLE: Primary | ICD-10-CM

## 2020-08-04 PROCEDURE — 58100 BIOPSY OF UTERUS LINING: CPT | Performed by: NURSE PRACTITIONER

## 2020-08-04 PROCEDURE — 99213 OFFICE O/P EST LOW 20 MIN: CPT | Performed by: NURSE PRACTITIONER

## 2020-08-04 PROCEDURE — 1036F TOBACCO NON-USER: CPT | Performed by: NURSE PRACTITIONER

## 2020-08-04 PROCEDURE — 88305 TISSUE EXAM BY PATHOLOGIST: CPT | Performed by: PATHOLOGY

## 2020-08-04 PROCEDURE — ND001 PR NO DOCUMENTATION: Performed by: NURSE PRACTITIONER

## 2020-08-04 NOTE — PROGRESS NOTES
Assessment/Plan:    1  Menorrhagia with irregular cycle  In 40 yo with Paraguard IUD  Discussed lab work and ultrasound findings in detail, bleeding likely due to perimenopausal hormonal changes  Explained rationale for endometrial sampling-- patient agrees and Ebx was performed today  Discussed management options for bleeding  She agrees to switching her paraguard to a Mirena IUD  precert request sent  RV 8/17/20 for IUD change  2   Bilateral ovarian cysts  Right 3 5 cm complex, left complex 19 mm  recommend f/u US 8-12w  Reviewed with patient  Subjective:      Patient ID: Jazmyn Pleitez is a 39 y o  female  HPI  FOLLOW UP  CC: menorrhagia with irregular cycle    40 yo with irregular bleeding, paraguard IUD  FSH 37 9, estradiol 19, hcg negative, TSH 1 27; hgb 12 7/ hct 37 9  7/25/20 pelvic US: eml 5 mm; SHIVANI 3 5 cm complex, LOC complex 19 mm; rec f/u US 8-12w  offer iud removal and LN IUD    Used AVERY in past but had chronic abnormal bleeding while using it  The following portions of the patient's history were reviewed and updated as appropriate: allergies, current medications, past family history, past medical history, past social history, past surgical history and problem list     Review of Systems   Constitutional: Negative for chills and fever  Respiratory: Negative for cough and shortness of breath  Genitourinary: Positive for menstrual problem and vaginal bleeding  Negative for difficulty urinating, dysuria, frequency, pelvic pain and vaginal discharge  Objective:    /58 (BP Location: Left arm, Patient Position: Sitting, Cuff Size: Standard)   Pulse 81   Temp 98 8 °F (37 1 °C)   Wt 73 7 kg (162 lb 6 4 oz)   LMP 07/24/2020 (Exact Date)   SpO2 99%   BMI 26 21 kg/m²      Physical Exam   Constitutional: She appears well-developed  She does not appear ill  No distress  HENT:   Head: Normocephalic and atraumatic     Eyes: Pupils are equal, round, and reactive to light    Pulmonary/Chest: Effort normal    Abdominal: Normal appearance  Genitourinary: There is no rash, tenderness, lesion or injury on the right labia  There is no rash, tenderness, lesion or injury on the left labia  Cervix exhibits no motion tenderness, no discharge and no friability  No vaginal discharge, erythema, tenderness or bleeding  No erythema, tenderness or bleeding in the vagina  No foreign body in the vagina  No signs of injury in the vagina  Neurological: She is alert  Skin: Skin is warm and dry  She is not diaphoretic  Psychiatric: Her behavior is normal  Mood, judgment and thought content normal        Endometrial biopsy    Date/Time: 8/4/2020 10:24 AM  Performed by: NIEVES Hoffman  Authorized by: NIEVES Hoffman     Consent:     Consent obtained:  Written    Consent given by:  Patient    Procedural risks discussed:  Bleeding and infection    Patient questions answered: yes      Patient agrees, verbalizes understanding, and wants to proceed: yes      Educational handouts given: yes      Instructions and paperwork completed: yes    Indication:     Indications: Other disorder of menstruation and other abnormal bleeding from female genital tract    Procedure:     A bivalve speculum was placed in the vagina: yes      Cervix cleaned and prepped: yes      A paracervical block was performed: no      An intracervical block was performed: no      The cervix was dilated: no      Uterus sounded: yes      Uterus sound depth (cm):  8    Specimen collected: specimen collected and sent to pathology      Patient tolerated procedure well with no complications: yes    Comments:      Post ebx instructions provided

## 2020-08-04 NOTE — PATIENT INSTRUCTIONS
Over the next 3 to 5 days, call us if you experience symptoms of fever, chills, foul smelling vaginal discharge, heavy bleeding or pain that is not getting better  Use pads only during this period of bleeding  (not tampons)  Avoid sex for 3 days, or use condoms for infection prevention  You may take ibuprofen, aleve or tylenol for any ongoing cramping  We will call you with result of the testing and what to do next

## 2020-08-14 ENCOUNTER — OFFICE VISIT (OUTPATIENT)
Dept: CARDIOLOGY CLINIC | Facility: CLINIC | Age: 45
End: 2020-08-14
Payer: COMMERCIAL

## 2020-08-14 VITALS
BODY MASS INDEX: 25.68 KG/M2 | TEMPERATURE: 98.2 F | DIASTOLIC BLOOD PRESSURE: 78 MMHG | HEIGHT: 66 IN | WEIGHT: 159.8 LBS | HEART RATE: 68 BPM | SYSTOLIC BLOOD PRESSURE: 110 MMHG

## 2020-08-14 DIAGNOSIS — R00.2 PALPITATIONS: Primary | ICD-10-CM

## 2020-08-14 DIAGNOSIS — R00.2 HEART PALPITATIONS: ICD-10-CM

## 2020-08-14 PROBLEM — E66.811 OBESITY, CLASS I, BMI 30-34.9: Status: RESOLVED | Noted: 2020-01-02 | Resolved: 2020-08-14

## 2020-08-14 PROBLEM — E66.9 OBESITY, CLASS I, BMI 30-34.9: Status: RESOLVED | Noted: 2020-01-02 | Resolved: 2020-08-14

## 2020-08-14 PROCEDURE — 93000 ELECTROCARDIOGRAM COMPLETE: CPT | Performed by: INTERNAL MEDICINE

## 2020-08-14 PROCEDURE — 3008F BODY MASS INDEX DOCD: CPT | Performed by: INTERNAL MEDICINE

## 2020-08-14 PROCEDURE — 99214 OFFICE O/P EST MOD 30 MIN: CPT | Performed by: INTERNAL MEDICINE

## 2020-08-14 PROCEDURE — 1036F TOBACCO NON-USER: CPT | Performed by: INTERNAL MEDICINE

## 2020-08-14 NOTE — PATIENT INSTRUCTIONS
CARDIOLOGY ASSESSMENT & PLAN:  1  Palpitations  POCT ECG    Holter monitor - 48 hour   2  Heart palpitations       Heart palpitations  Ms Purnima Asencio is noted to have intermittent palpitations which are occurring in association with anxiety  She has previous history of symptomatic PVCs  Her recent thyroid function is normal   ECG is overall unremarkable  Her blood pressure is well controlled  She had workup with stress test and echocardiogram in November 2018 which were overall unremarkable  I think her symptoms are probably anxiety related  However due to persisting symptoms we will arrange for a 48 hour Holter monitor  -- I am advising her to keep a diary of symptoms, continue normal activity including exercise and avoid anxiety and stress  -- Dietary and medical compliance are reinforced  -- Advised  to report any concerning symptoms such as chest pain, shortness of breath, decline in exercise tolerance or presyncope/syncope

## 2020-08-14 NOTE — PROGRESS NOTES
[facility]  [address] [place] [address], Omar Garcia  Phone#  [phone]  Fax#  [phone]  *-*-*-*-*-*-*-*-*-*-*-*-*-*-*-*-*-*-*-*-*-*-*-*-*-*-*-*-*-*-*-*-*-*-*-*-*-*-*-*-*-*-*-*-*-*-*-*-*-*-*-*-*-*  ENCOUNTER DATE: 20 9:36 AM  PATIENT NAME: Purnima Asencio   1975    [id number]  Age: 39 y o  Sex: female  AUTHOR: Jose Antonio Logan MD  PRIMARYCARE PHYSICIAN: Paul Little MD    DIAGNOSES:  1  Symptomatic premature ventricular contractions  2  History of  in the past  3  History of uterine implant  4  History of diverticulitis  5  Palpitations  6  Menorrhagia  7  Ovarian cyst  8  Colonic diverticulosis    Echocardiogram 2018:  - Normal left ventricular cavity size, wall thickness and systolic and diastolic function  EF approximately 60-65%  - No significant chamber hypertrophy or enlargement  - Mild aortic valve sclerosis, no aortic valve stenosis or regurgitation  - mild mitral valve leaflet sclerosis and early changes of mitral annular calcification, trace mitral and tricuspid valve regurgitation   - No obvious pulmonary hypertension   - No pericardial effusion  Exercise treadmill stress test in 2018:  Stress results: Duration of exercise was 10 min and 30 sec  Target heart rate was achieved  There was no chest pain during stress  IMPRESSIONS: 1  Exercise treadmill stress test is negative for ischemia at 91% of patient's age predicted maximal heart rate  2  Good exercise capacity  3  Normal resting blood pressure and appropriate blood pressure response to exercise  4  No chest pain reported with exercise  5  No significant arrhythmia noted with exercise  CURRENT ECG:  Results for orders placed or performed in visit on 20   POCT ECG    Narrative    Normal sinus rhythm, leftward axis, HR 68 beats per minute, no significant ST T-wave abnormalities  Slightly delayed R-wave transition  CARDIOLOGY ASSESSMENT & PLAN:  1   Palpitations  POCT ECG    Holter monitor - 48 hour   2  Heart palpitations       Heart palpitations  Ms Rinku Garcia is noted to have intermittent palpitations which are occurring in association with anxiety  She has previous history of symptomatic PVCs  Her recent thyroid function is normal   ECG is overall unremarkable  Her blood pressure is well controlled  She had workup with stress test and echocardiogram in November 2018 which were overall unremarkable  I think her symptoms are probably anxiety related  However due to persisting symptoms we will arrange for a 48 hour Holter monitor  -- I am advising her to keep a diary of symptoms, continue normal activity including exercise and avoid anxiety and stress  -- Dietary and medical compliance are reinforced  -- Advised  to report any concerning symptoms such as chest pain, shortness of breath, decline in exercise tolerance or presyncope/syncope  INTERVAL HISTORY & HISTORY OF PRESENT ILLNESS:  Rinku Garcia is here for follow-up regarding her cardiac comorbidities which include: Palpitations, PVCs  Patient is here for follow-up  Reports that lately she has been feeling palpitations especially when she is resting and when she is nervous and stressed  Symptoms seem to dissipate when she becomes active and walks around  She is very active and exercises regularly  She has not experienced any decline in her exercise tolerance  Denies any sustained palpitations  Denies orthopnea, PND or pedal edema  She has been diagnosed menorrhagia and ovarian cysts and is following with gynecologist   Was recently treated for suspected diverticulitis  Reports that she is scheduled to undergo EGD and colonoscopy  Functional capacity status:  Good   (Excellent- >10 METs; Good: (7-10 METs); Moderate (4-7 METs); Poor (<= 4 METs)    Any chronic stressors:  None   (feeling of poor health, financial problems, and social isolation etc)      Tobacco or alcohol dependence: None      REVIEW OF SYMPTOMS:    Positive for:  Palpitations, some anxiety  Negative for: All remaining as reviewed below and in HPI  SYSTEM SYMPTOMS REVIEWED:  General--weight change, fever, night sweats  Respiratory--cough, wheezing, shortness of breath, sputum production  Cardiovascular--chest pain, syncope, dyspnea on exertion, edema, decline in exercise tolerance, claudication   Gastrointestinal--persistent vomiting, diarrhea, abdominal distention, blood in stool   Muscular or skeletal--joint pain or swelling   Neurologic--headaches, syncope, abnormal movement  Hematologic--history of easy bruising and bleeding   Endocrine--thyroid enlargement, heat or cold intolerance, polyuria   Psychiatric--anxiety, depression     *-*-*-*-*-*-*-*-*-*-*-*-*-*-*-*-*-*-*-*-*-*-*-*-*-*-*-*-*-*-*-*-*-*-*-*-*-*-*-*-*-*-*-*-*-*-*-*-*-*-*-*-*-*-  VITAL SIGNS:  Vitals:    08/14/20 0908   BP: 110/78   Pulse: 68   Temp: 98 2 °F (36 8 °C)   Weight: 72 5 kg (159 lb 12 8 oz)   Height: 5' 6" (1 676 m)     Weight (last 2 days)     Date/Time   Weight    08/14/20 0908   72 5 (159 8)           ,   Wt Readings from Last 3 Encounters:   08/14/20 72 5 kg (159 lb 12 8 oz)   08/04/20 73 7 kg (162 lb 6 4 oz)   07/23/20 73 5 kg (162 lb)    , Body mass index is 25 79 kg/m²  *-*-*-*-*-*-*-*-*-*-*-*-*-*-*-*-*-*-*-*-*-*-*-*-*-*-*-*-*-*-*-*-*-*-*-*-*-*-*-*-*-*-*-*-*-*-*-*-*-*-*-*-*-*-  PHYSICAL EXAM:  General Appearance:    Alert, cooperative, no distress, appears stated age   Head, Eyes, ENT:    No obvious abnormality, moist mucous mebranes  Neck:   Supple, no carotid bruit or JVD   Back:     Symmetric, no curvature  Lungs:     Respirations unlabored  Clear to auscultation bilaterally,    Chest wall:    No tenderness or deformity   Heart:    Regular rate and rhythm, S1 and S2 normal, no murmur, rub  or gallop     Abdomen:     Soft, non-tender, No obvious masses, or organomegaly   Extremities:   Extremities normal, no cyanosis or edema    Skin: Skin color, texture, turgor normal, no rashes or lesions     *-*-*-*-*-*-*-*-*-*-*-*-*-*-*-*-*-*-*-*-*-*-*-*-*-*-*-*-*-*-*-*-*-*-*-*-*-*-*-*-*-*-*-*-*-*-*-*-*-*-*-*-*-*-  CURRENT MEDICATION LIST:    Current Outpatient Medications:     atenolol (TENORMIN) 25 mg tablet, TAKE ONE TABLET BY MOUTH EVERY DAY , Disp: 90 tablet, Rfl: 5    multivitamin (THERAGRAN) TABS, Take 1 tablet by mouth, Disp: , Rfl:     PARAGARD INTRAUTERINE COPPER IUD, by Intrauterine route, Disp: , Rfl:     Vitamin D, Cholecalciferol, 1000 units CAPS, Take 1,000 Units by mouth, Disp: , Rfl:     ALLERGIES:  No Known Allergies    *-*-*-*-*-*-*-*-*-*-*-*-*-*-*-*-*-*-*-*-*-*-*-*-*-*-*-*-*-*-*-*-*-*-*-*-*-*-*-*-*-*-*-*-*-*-*-*-*-*-*-*-*-*-  The LABORATORY DATA:  I have personally reviewed pertinent labs  No results found for: NA  Potassium   Date Value Ref Range Status   11/05/2019 4 3 3 6 - 5 0 mmol/L Final     Chloride   Date Value Ref Range Status   11/05/2019 103 97 - 108 mmol/L Final     CO2   Date Value Ref Range Status   11/05/2019 29 22 - 30 mmol/L Final     BUN   Date Value Ref Range Status   11/05/2019 9 5 - 25 mg/dL Final     Creatinine   Date Value Ref Range Status   11/05/2019 0 67 0 60 - 1 20 mg/dL Final     Comment:     Standardized to IDMS reference method     eGFR   Date Value Ref Range Status   11/05/2019 109 >60 ml/min/1 73sq m Final     Calcium   Date Value Ref Range Status   11/05/2019 9 3 8 4 - 10 2 mg/dL Final     AST   Date Value Ref Range Status   11/05/2019 21 14 - 36 U/L Final     Comment:       Specimen collection should occur prior to Sulfasalazine administration due to the potential for falsely depressed results  ALT   Date Value Ref Range Status   11/05/2019 22 9 - 52 U/L Final     Comment:       Specimen collection should occur prior to Sulfasalazine administration due to the potential for falsely depressed results        Alkaline Phosphatase   Date Value Ref Range Status   11/05/2019 49 43 - 122 U/L Final WBC   Date Value Ref Range Status   2020 8 10 4 50 - 11 00 Thousand/uL Final   2019 9 30 4 50 - 11 00 Thousand/uL Final     Hemoglobin   Date Value Ref Range Status   2020 12 7 12 0 - 16 0 g/dL Final   2019 12 8 12 0 - 16 0 g/dL Final     Platelets   Date Value Ref Range Status   2020 261 150 - 450 Thousands/uL Final   2019 264 150 - 450 Thousands/uL Final     No results found for: PT, PTT, INR  No results found for: CKMB, DIGOXIN  No results found for: TSH  No results found for: CHOL   No results found for: HGBA1C  No results found for: Cristi Alu, GRAMSTAIN, URINECX, WOUNDCULT, BODYFLUIDCUL, MRSACULTURE, INFLUAPCR, INFLUBPCR, RSVPCR, LEGIONELLAUR, CDIFFTOXINB    *-*-*-*-*-*-*-*-*-*-*-*-*-*-*-*-*-*-*-*-*-*-*-*-*-*-*-*-*-*-*-*-*-*-*-*-*-*-*-*-*-*-*-*-*-*-*-*-*-*-*-*-*-*-  PREVIOUS CARDIOLOGY & RADIOLOGY RESULTS:  Results for orders placed during the hospital encounter of 18   Echo complete with contrast if indicated    [address]    Transthoracic Echocardiogram  2D, M-mode, Doppler, and Color Doppler    Study date:  2018    Patient: Kelly Dickens  MR number: [id number]  Account number: [de-identified]  : 1975  Age: 41 years  Gender: Female  Status: Outpatient  Location: [address] [place] [address]  Height: 67 in  Weight: 160 lb  BP: 108/ 70 mmHg    Indications: Tachycardia, palpitations    Diagnoses: R00 0 - Tachycardia, unspecified    Sonographer:  Jessica Lombardo, CCT,RCS  Primary Physician:  Cherry Isidro MD  Referring Physician:  Kaley Campbell MD  Group:  [facility]  Interpreting Physician:  Kaley Campbell MD    IMPRESSIONS:  - Normal left ventricular cavity size, wall thickness and systolic and diastolic function  EF approximately 60-65%  - No significant chamber hypertrophy or enlargement    - Mild aortic valve sclerosis, no aortic valve stenosis or regurgitation  - mild mitral valve leaflet sclerosis and early changes of mitral annular calcification, trace mitral and tricuspid valve regurgitation   - No obvious pulmonary hypertension   - No pericardial effusion  No previous echocardiogram is available for comparison  SUMMARY    LEFT VENTRICLE:  Normal left ventricular cavity size, normal wall thickness, normal left ventricle systolic function and wall motion  Ejection fraction is estimated as around 60-65%  Normal diastolic function  RIGHT VENTRICLE:  Normal right ventricular size and systolic function  Normal estimated right ventricular systolic pressure  LEFT ATRIUM:  Normal left atrial cavity size  Intact interatrial septum  RIGHT ATRIUM:  Normal right atrial cavity size  MITRAL VALVE:  Mild mitral valve leaflet sclerosis, early changes of mitral annular calcification  Trace mitral valve regurgitation  AORTIC VALVE:  Tricuspid aortic valve with mild sclerosis  No aortic valve stenosis or regurgitation  TRICUSPID VALVE:  Trace tricuspid valve regurgitation  PULMONIC VALVE:  No significant pulmonic valve regurgitation  AORTA:  Aortic root and proximal ascending aorta are normal in size on 2D imaging  IVC, HEPATIC VEINS:  Inferior vena cava is normal in size and demonstrates appropriate respiratory phasic changes in diameter  PERICARDIUM:  No pericardial effusion  HISTORY: PRIOR HISTORY: Palpitations, former smoker    PROCEDURE: The study was performed in the [employer]  This was a routine study  The transthoracic approach was used  The study included complete 2D imaging, M-mode, complete spectral Doppler, and color Doppler  The heart rate was  72 bpm, at the start of the study  Images were obtained from the parasternal, apical, subcostal, and suprasternal notch acoustic windows  Image quality was adequate      LEFT VENTRICLE: Normal left ventricular cavity size, normal wall thickness, normal left ventricle systolic function and wall motion  Ejection fraction is estimated as around 60-65%  Normal diastolic function  RIGHT VENTRICLE: Normal right ventricular size and systolic function  Normal estimated right ventricular systolic pressure  LEFT ATRIUM: Normal left atrial cavity size  Intact interatrial septum  RIGHT ATRIUM: Normal right atrial cavity size  MITRAL VALVE: Mild mitral valve leaflet sclerosis, early changes of mitral annular calcification  Trace mitral valve regurgitation  AORTIC VALVE: Tricuspid aortic valve with mild sclerosis  No aortic valve stenosis or regurgitation  TRICUSPID VALVE: Trace tricuspid valve regurgitation  PULMONIC VALVE: No significant pulmonic valve regurgitation  PERICARDIUM: No pericardial effusion  AORTA: Aortic root and proximal ascending aorta are normal in size on 2D imaging  SYSTEMIC VEINS: IVC: Inferior vena cava is normal in size and demonstrates appropriate respiratory phasic changes in diameter      SYSTEM MEASUREMENT TABLES    2D  %FS: 32 43 %  Ao Diam: 2 76 cm  EDV(Teich): 84 73 ml  EF(Teich): 61 01 %  ESV(Teich): 33 04 ml  IVSd: 0 93 cm  LA Area: 14 46 cm2  LA Diam: 2 87 cm  LVEDV MOD A4C: 98 34 ml  LVEF MOD A4C: 64 89 %  LVESV MOD A4C: 34 52 ml  LVIDd: 4 34 cm  LVIDs: 2 93 cm  LVLd A4C: 8 36 cm  LVLs A4C: 6 8 cm  LVPWd: 0 86 cm  RA Area: 9 46 cm2  RVIDd: 3 02 cm  SV MOD A4C: 63 81 ml  SV(Teich): 51 7 ml    CW  AV Vmax: 1 62 m/s  AV maxPG: 10 44 mmHg  RAP: 10 mmHg  TR Vmax: 2 27 m/s  TR maxP 6 mmHg    PW  E': 0 15 m/s  E' Sept: 0 15 m/s  E/E': 5 13  E/E' Sept: 5 05  LVOT Vmax: 0 96 m/s  LVOT maxPG: 3 69 mmHg  MV A Ramsey: 0 65 m/s  MV Dec Scott: 3 72 m/s2  MV DecT: 207 9 ms  MV E Ramsey: 0 77 m/s  MV E/A Ratio: 1 19  MV PHT: 60 29 ms  MVA By PHT: 3 65 cm2  PAEDP: 15 56 mmHg  PRend P 56 mmHg  PRend Vmax: 1 18 m/s  RVSP: 30 6 mmHg    [facility]    Prepared and electronically signed by    Lily Bonilla MD  Signed [id number] 10:30:11       No results found for this or any previous visit  No results found for this or any previous visit  No results found for this or any previous visit  US pelvis complete w transvaginal  Narrative: PELVIC ULTRASOUND, COMPLETE    INDICATION:  39years old  N92 1: Excessive and frequent menstruation with irregular cycle  COMPARISON: None    TECHNIQUE:   Transabdominal pelvic ultrasound was performed in sagittal and transverse planes with a curvilinear transducer  Additional transvaginal imaging was performed to better evaluate the endometrium and ovaries  Imaging included volumetric   sweeps as well as traditional still imaging technique  FINDINGS:    UTERUS:  The uterus is anteverted in position, measuring 9 5 x 4 6 x 5 2 cm  Contour and echotexture appear normal   The cervix shows no suspicious abnormality  ENDOMETRIUM:    Normal caliber of 5 mm  An IUD is present  OVARIES/ADNEXA:  Right ovary:  5 3 x 3 8 x 3 2 cm  Complex right adnexal cyst likely representing a hemorrhagic cyst  Dimensions of 3 5 x 3 0 x 3 3 cm  Doppler flow within normal limits  Left ovary:  2 5 x 2 1 x 1 4 cm  Mildly complex 19 mm left adnexal cyst  Doppler flow within normal limits  No suspicious adnexal mass or loculated collections  There is no free fluid  Impression:    Complex bilateral adnexal lesions  Right adnexal lesion measuring 3 5 cm is likely a hemorrhagic cyst  19 mm left adnexal cyst is likely a complex follicle  Follow-up with repeat ultrasound in 8-12 weeks  An IUD is in place      Workstation performed: [id number]        *-*-*-*-*-*-*-*-*-*-*-*-*-*-*-*-*-*-*-*-*-*-*-*-*-*-*-*-*-*-*-*-*-*-*-*-*-*-*-*-*-*-*-*-*-*-*-*-*-*-*-*-*-*-  SIGNATURES:   @NBK@   Lily Bonilla MD     *-*-*-*-*-*-*-*-*-*-*-*-*-*-*-*-*-*-*-*-*-*-*-*-*-*-*-*-*-*-*-*-*-*-*-*-*-*-*-*-*-*-*-*-*-*-*-*-*-*-*-*-*-*-    PAST MEDICAL HISTORY:  Past Medical History:   Diagnosis Date    Diverticulitis     Palpitations     Varicella vaccination     PAST SURGICAL HISTORY:   Past Surgical History:   Procedure Laterality Date     SECTION      x4    MOUTH SURGERY      implant of tooth x 2    CA COLONOSCOPY FLX DX W/COLLJ SPEC WHEN PFRMD N/A 5/15/2017    Procedure: COLONOSCOPY;  Surgeon: Romel Carranza MD;  Location: [facility];   Service: Gastroenterology    WISDOM TOOTH EXTRACTION           FAMILY HISTORY:  Family History   Problem Relation Age of Onset    Brain cancer Maternal Grandmother     Breast cancer Maternal Aunt 79    Uterine cancer Mother     Diabetes Mother     Hypertension Mother     No Known Problems Father     Fibroids Sister     Other Sister         bilateral hip replacement    No Known Problems Brother     Liver disease Sister         cyst on liver    No Known Problems Sister     No Known Problems Sister     No Known Problems Sister     No Known Problems Brother     Lung disease Brother     Hypertension Brother     No Known Problems Brother     Other Brother         palpitations    No Known Problems Half-Brother     Lung cancer Maternal Uncle     No Known Problems Daughter     No Known Problems Maternal Grandfather     Hypertension Paternal Grandmother     Diabetes Paternal Grandmother     No Known Problems Paternal Grandfather     No Known Problems Daughter     No Known Problems Daughter     Fibroids Maternal Aunt 36        hysterectomy    Ovarian cancer Neg Hx     Colon cancer Neg Hx     SOCIAL HISTORY:  Social History     Tobacco Use   Smoking Status Former Smoker   Smokeless Tobacco Never Used   Tobacco Comment    Hooka      Social History     Substance and Sexual Activity   Alcohol Use No     Social History     Substance and Sexual Activity   Drug Use No    [id number]     *-*-*-*-*-*-*-*-*-*-*-*-*-*-*-*-*-*-*-*-*-*-*-*-*-*-*-*-*-*-*-*-*-*-*-*-*-*-*-*-*-*-*-*-*-*-*-*-*-*-*-*-*-*  ALLERGIES:  No Known Allergies CURRENT SCHEDULED MEDICATIONS:    Current Outpatient Medications:     atenolol (TENORMIN) 25 mg tablet, TAKE ONE TABLET BY MOUTH EVERY DAY , Disp: 90 tablet, Rfl: 5    multivitamin (THERAGRAN) TABS, Take 1 tablet by mouth, Disp: , Rfl:     PARAGARD INTRAUTERINE COPPER IUD, by Intrauterine route, Disp: , Rfl:     Vitamin D, Cholecalciferol, 1000 units CAPS, Take 1,000 Units by mouth, Disp: , Rfl:      *-*-*-*-*-*-*-*-*-*-*-*-*-*-*-*-*-*-*-*-*-*-*-*-*-*-*-*-*-*-*-*-*-*-*-*-*-*-*-*-*-*-*-*-*-*-*-*-*-*-*-*-*-*

## 2020-08-14 NOTE — ASSESSMENT & PLAN NOTE
Ms Marion Espino is noted to have intermittent palpitations which are occurring in association with anxiety  She has previous history of symptomatic PVCs  Her recent thyroid function is normal   ECG is overall unremarkable  Her blood pressure is well controlled  She had workup with stress test and echocardiogram in November 2018 which were overall unremarkable  I think her symptoms are probably anxiety related  However due to persisting symptoms we will arrange for a 48 hour Holter monitor  -- I am advising her to keep a diary of symptoms, continue normal activity including exercise and avoid anxiety and stress  -- Dietary and medical compliance are reinforced  -- Advised  to report any concerning symptoms such as chest pain, shortness of breath, decline in exercise tolerance or presyncope/syncope

## 2020-08-17 ENCOUNTER — OFFICE VISIT (OUTPATIENT)
Dept: OBGYN CLINIC | Facility: CLINIC | Age: 45
End: 2020-08-17
Payer: COMMERCIAL

## 2020-08-17 VITALS
HEART RATE: 79 BPM | TEMPERATURE: 98.7 F | WEIGHT: 161.2 LBS | SYSTOLIC BLOOD PRESSURE: 104 MMHG | BODY MASS INDEX: 26.02 KG/M2 | DIASTOLIC BLOOD PRESSURE: 72 MMHG

## 2020-08-17 DIAGNOSIS — N83.202 BILATERAL OVARIAN CYSTS: ICD-10-CM

## 2020-08-17 DIAGNOSIS — N83.201 BILATERAL OVARIAN CYSTS: ICD-10-CM

## 2020-08-17 DIAGNOSIS — Z30.433 ENCOUNTER FOR REMOVAL AND REINSERTION OF INTRAUTERINE CONTRACEPTIVE DEVICE (IUD): Primary | ICD-10-CM

## 2020-08-17 PROCEDURE — 58301 REMOVE INTRAUTERINE DEVICE: CPT | Performed by: NURSE PRACTITIONER

## 2020-08-17 PROCEDURE — 58300 INSERT INTRAUTERINE DEVICE: CPT | Performed by: NURSE PRACTITIONER

## 2020-08-17 NOTE — PROGRESS NOTES
Iud removal    Date/Time: 8/17/2020 3:39 PM  Performed by: NIEVES Coleman  Authorized by: NIEVES Coleman     Consent:     Consent obtained:  Written    Consent given by:  Patient    Procedure risks and benefits discussed: yes      Patient questions answered: yes      Patient agrees, verbalizes understanding, and wants to proceed: yes    Procedure:     Removed with no complications: yes      Removal due to mechanical complications of IUD: no      Removal due to infection and inflammatory reaction: no      Other reason for removal:  Abnormal uterine bleeding  Iud insertions    Date/Time: 8/17/2020 3:40 PM  Performed by: NIEVES Coleman  Authorized by: NIEVES Coleman     Consent:     Consent obtained:  Written    Consent given by:  Patient    Procedure risks and benefits discussed: yes      Patient questions answered: yes      Patient agrees, verbalizes understanding, and wants to proceed: yes      Educational handouts given: yes      Instructions and paperwork completed: yes    Procedure:     Cervix cleaned and prepped: yes      Speculum placed in vagina: yes      Tenaculum applied to cervix: yes      Uterus sounded: yes      Uterus sound depth (cm):  9    IUD type:  Mirena    Strings trimmed: yes    Post-procedure:     Patient tolerated procedure well: yes    Comments:      Post IUD instructions given

## 2020-08-18 ENCOUNTER — HOSPITAL ENCOUNTER (OUTPATIENT)
Dept: NON INVASIVE DIAGNOSTICS | Facility: HOSPITAL | Age: 45
Discharge: HOME | End: 2020-08-18
Attending: INTERNAL MEDICINE
Payer: COMMERCIAL

## 2020-08-18 DIAGNOSIS — R00.2 PALPITATIONS: ICD-10-CM

## 2020-08-18 PROCEDURE — 93226 XTRNL ECG REC<48 HR SCAN A/R: CPT

## 2020-08-18 PROCEDURE — 93227 XTRNL ECG REC<48 HR R&I: CPT | Performed by: INTERNAL MEDICINE

## 2020-08-18 PROCEDURE — 93225 XTRNL ECG REC<48 HRS REC: CPT

## 2020-08-19 ENCOUNTER — OFFICE VISIT (OUTPATIENT)
Dept: GASTROENTEROLOGY | Facility: MEDICAL CENTER | Age: 45
End: 2020-08-19
Payer: COMMERCIAL

## 2020-08-19 ENCOUNTER — PREP FOR PROCEDURE (OUTPATIENT)
Dept: GASTROENTEROLOGY | Facility: MEDICAL CENTER | Age: 45
End: 2020-08-19

## 2020-08-19 VITALS
WEIGHT: 163 LBS | TEMPERATURE: 99.6 F | HEIGHT: 66 IN | SYSTOLIC BLOOD PRESSURE: 106 MMHG | HEART RATE: 68 BPM | DIASTOLIC BLOOD PRESSURE: 68 MMHG | BODY MASS INDEX: 26.2 KG/M2

## 2020-08-19 DIAGNOSIS — K57.92 DIVERTICULITIS: Primary | ICD-10-CM

## 2020-08-19 DIAGNOSIS — K59.04 CHRONIC IDIOPATHIC CONSTIPATION: ICD-10-CM

## 2020-08-19 DIAGNOSIS — K21.9 GASTROESOPHAGEAL REFLUX DISEASE WITHOUT ESOPHAGITIS: ICD-10-CM

## 2020-08-19 DIAGNOSIS — Z12.11 SCREENING FOR COLON CANCER: ICD-10-CM

## 2020-08-19 PROBLEM — Z12.4 PAP SMEAR FOR CERVICAL CANCER SCREENING: Status: RESOLVED | Noted: 2018-12-19 | Resolved: 2020-08-19

## 2020-08-19 PROBLEM — Z12.31 VISIT FOR SCREENING MAMMOGRAM: Status: RESOLVED | Noted: 2018-12-19 | Resolved: 2020-08-19

## 2020-08-19 PROBLEM — Z01.419 ENCOUNTER FOR ANNUAL ROUTINE GYNECOLOGICAL EXAMINATION: Status: RESOLVED | Noted: 2018-12-19 | Resolved: 2020-08-19

## 2020-08-19 PROCEDURE — 99244 OFF/OP CNSLTJ NEW/EST MOD 40: CPT | Performed by: INTERNAL MEDICINE

## 2020-08-19 RX ORDER — FAMOTIDINE 20 MG/1
20 TABLET, FILM COATED ORAL DAILY
Qty: 60 TABLET | Refills: 1 | Status: SHIPPED | OUTPATIENT
Start: 2020-08-19 | End: 2021-01-07

## 2020-08-19 NOTE — PATIENT INSTRUCTIONS
For your bowel habits, as we discussed during today's visit,  - I would recommend starting psyllium, fiber supplementation  This can be done with use of Metamucil or Benefiber fiber, which can be purchased over-the-counter  I would recommend starting slow with 1 tsp mixed into a large glass of water, once a day, and increasing to goal of 1 tbsp mixed into a large glass of water per day    - this helps with both diarrhea and constipation, helping to bulk the stool, and should not cause constipation or diarrhea, but treat both  - the only side effect of this can be bloating, if this occurs, cut down on the dose, and increase your water intake

## 2020-08-19 NOTE — PATIENT INSTRUCTIONS
The patient is scheduled at [facility] for a colon/egd with Dr Moreno on 10/06/2020  miralax/dulcolax prep instructions have been gone over in the office, with the patient, by the MA  The patient is aware that they will receive a call with the arrival time the day prior to procedure and that they will need a  the day of the procedure   I have asked the patient to call with any questions that they might have prior to procedure

## 2020-08-19 NOTE — PROGRESS NOTES
[facility] - Outpatient Consultation  [facility] 39 y o  female MRN: [id number]  Encounter: [id number]      PCP: Cherry Isidro MD  Referring: Cherry Isidro MD  6 [facility], [address] [place] [address]       ASSESSMENT AND PLAN:      1  Diverticulitis  2  Chronic idiopathic constipation  Second episode of diverticulitis, the 1st occurred in 2017, and she did undergo colonoscopy in 2017  Will now need repeat colonoscopy following her 2nd episode  Recommend fiber supplementation to avoid straining, discussed natural course and pathophysiology of diverticular disease  - Ambulatory referral to Gastroenterology  - Colonoscopy    3  Gastroesophageal reflux disease without esophagitis  Discussed anti-reflux measures, and offered handout detailing, including weight loss, avoidance of lying down after meals, recognize/avoid trigger foods, and elevating the head of bed  Encourage use of as needed H2 blockers that can be use before meal or after  - EGD, to evaluate for erosive disease, especially given atypical symptoms of heart palpitations and rule out H pylori  - famotidine (PEPCID) 20 mg tablet; Take 1 tablet (20 mg total) by mouth daily  Dispense: 60 tablet; Refill: 1      ______________________________________________________________________    HPI:      Patient is a 71-year-old female referred to my office for evaluation of recent diverticulitis episode  She has a history of diverticular disease, is otherwise healthy, and is being evaluated for heart palpitations by electrophysiology  He is present with her  today, who contributes to history  Her 1st episode of diverticulitis occurred three years ago, in 2017  She underwent colonoscopy by my partner Dr Jake Alvarez at that time, which demonstrated diverticular disease scattered throughout the colon  She had no evidence of polyps, and was recommended for repeat colonoscopy in 10 years  She has no family history of colon cancer    She was recently seen in the ED at [facility] in, with acute onset of periumbilical pain, associated with chills  She underwent CT imaging which demonstrated transverse diverticulitis  She was treated with antibiotic course, with resolution of her symptoms  She states that she has irregular bowel habits, with bowel movements occurring every once to two days  She does note occasional straining  Additionally, she complains of reflux symptoms  Symptoms happen two to 3 times a week  They are exacerbated by intake of a heavy meal, excessively late at night  Her symptoms have been refractory to over-the-counter Rolaids and Tums  She does not improvement in her palpitations when she takes these antacids  She denies any dysphagia, unintentional weight loss, rectal bleeding  She has never previously had an endoscopy  REVIEW OF SYSTEMS:    CONSTITUTIONAL: Denies any fever, chills, rigors, and weight loss  HEENT: No earache or tinnitus  Denies hearing loss or visual disturbances  CARDIOVASCULAR: No chest pain ++palpitations  RESPIRATORY: Denies any cough, hemoptysis, shortness of breath or dyspnea on exertion  GASTROINTESTINAL: As noted in the History of Present Illness  GENITOURINARY: No problems with urination  Denies any hematuria or dysuria  NEUROLOGIC: No dizziness or vertigo, denies headaches  MUSCULOSKELETAL: Denies any muscle or joint pain  SKIN: Denies skin rashes or itching  ENDOCRINE: Denies excessive thirst  Denies intolerance to heat or cold  PSYCHOSOCIAL: Denies depression or anxiety  Denies any recent memory loss         Historical Information   Past Medical History:   Diagnosis Date    Diverticulitis     Palpitations     Varicella vaccination      Past Surgical History:   Procedure Laterality Date     SECTION      x4    INSERTION OF INTRAUTERINE DEVICE (IUD)  2020    Mirena    MOUTH SURGERY      implant of tooth x 2    [place] COLONOSCOPY FLX DX W/COLLJ SPEC WHEN PFRMD N/A 5/15/2017    Procedure: COLONOSCOPY;  Surgeon: Lorenzo Ramirez MD;  Location: [facility] GI LAB; Service: Gastroenterology    REMOVAL OF INTRAUTERINE DEVICE (IUD)  08/17/2020    Paraguard    WISDOM TOOTH EXTRACTION       Social History   Social History     Substance and Sexual Activity   Alcohol Use No     Social History     Substance and Sexual Activity   Drug Use No     Social History     Tobacco Use   Smoking Status Former Smoker   Smokeless Tobacco Never Used   Tobacco Comment    Hooka     Family History   Problem Relation Age of Onset    Brain cancer Maternal Grandmother     Breast cancer Maternal Aunt 79    Uterine cancer Mother     Diabetes Mother     Hypertension Mother     No Known Problems Father     Fibroids Sister     Other Sister         bilateral hip replacement    No Known Problems Brother     Liver disease Sister         cyst on liver    No Known Problems Sister     No Known Problems Sister     No Known Problems Sister     No Known Problems Brother     Lung disease Brother     Hypertension Brother     No Known Problems Brother     Other Brother         palpitations    No Known Problems Half-Brother     Lung cancer Maternal Uncle     No Known Problems Daughter     No Known Problems Maternal Grandfather     Hypertension Paternal Grandmother     Diabetes Paternal Grandmother     No Known Problems Paternal Grandfather     No Known Problems Daughter     No Known Problems Daughter     Fibroids Maternal Aunt 36        hysterectomy    Ovarian cancer Neg Hx     Colon cancer Neg Hx        Meds/Allergies       Current Outpatient Medications:     atenolol (TENORMIN) 25 mg tablet    multivitamin (THERAGRAN) TABS    Vitamin D, Cholecalciferol, 1000 units CAPS    No Known Allergies        Objective     Blood pressure 106/68, pulse 68, temperature 99 6 °F (37 6 °C), temperature source Tympanic, height 5' 6" (1 676 m), weight 73 9 kg (163 lb), last menstrual period 07/24/2020   Body mass index is 26 31 kg/m²  PHYSICAL EXAM:      General Appearance:   Alert, cooperative, no distress   HEENT:   Normocephalic, atraumatic, anicteric  Neck:  Supple, symmetrical, trachea midline   Lungs:   Clear to auscultation bilaterally; no rales, rhonchi or wheezing; respirations unlabored    Heart[de-identified]   Regular rate and rhythm; no murmur, rub, or gallop, attachment of EP device/Holter monitor  Abdomen:   Soft, non-tender, non-distended; normal bowel sounds; no masses, no organomegaly    Genitalia:   Deferred    Rectal:   Deferred    Extremities:  No cyanosis, clubbing or edema    Pulses:  2+ and symmetric    Skin:  No jaundice, rashes, or lesions    Lymph nodes:  No palpable cervical lymphadenopathy        Lab Results:     Lab Results   Component Value Date    WBC 8 10 07/27/2020    HGB 12 7 07/27/2020    HCT 37 9 07/27/2020    MCV 86 07/27/2020     07/27/2020       Lab Results   Component Value Date    K 4 3 11/05/2019     11/05/2019    CO2 29 11/05/2019    BUN 9 11/05/2019    CREATININE 0 67 11/05/2019    GLUF 94 11/05/2019    CALCIUM 9 3 11/05/2019    AST 21 11/05/2019    ALT 22 11/05/2019    ALKPHOS 49 11/05/2019    EGFR 109 11/05/2019       No results found for: INR, PROTIME      Radiology Results:       Portions of the record may have been created with voice recognition software  Occasional wrong word or "sound a like" substitutions may have occurred due to the inherent limitations of voice recognition software  Read the chart carefully and recognize, using context, where substitutions have occurred

## 2020-08-27 ENCOUNTER — ANESTHESIA EVENT (OUTPATIENT)
Dept: GASTROENTEROLOGY | Facility: MEDICAL CENTER | Age: 45
End: 2020-08-27

## 2020-09-24 ENCOUNTER — OFFICE VISIT (OUTPATIENT)
Dept: OBGYN CLINIC | Facility: CLINIC | Age: 45
End: 2020-09-24
Payer: COMMERCIAL

## 2020-09-24 VITALS
SYSTOLIC BLOOD PRESSURE: 112 MMHG | HEIGHT: 66 IN | TEMPERATURE: 98.4 F | DIASTOLIC BLOOD PRESSURE: 78 MMHG | BODY MASS INDEX: 26.33 KG/M2 | WEIGHT: 163.8 LBS | OXYGEN SATURATION: 99 % | HEART RATE: 69 BPM

## 2020-09-24 DIAGNOSIS — Z30.431 CONTRACEPTIVE, SURVEILLANCE, INTRAUTERINE DEVICE: Primary | ICD-10-CM

## 2020-09-24 PROCEDURE — 99213 OFFICE O/P EST LOW 20 MIN: CPT | Performed by: NURSE PRACTITIONER

## 2020-09-24 NOTE — PROGRESS NOTES
Assessment/Plan:    1  Contraceptive, surveillance, intrauterine device  Normal IUD check  Explained that her menstrual bleeding duration will hopefully get shorter as time goes on with the new Mirena IUD  She will call in 2 months if no improvement  RV 1/2021 for yearly      Subjective:      Patient ID: Terrell Gonzalez is a 39 y o  female  HPI  FOLLOW UP  CC: IUD check    Post removal and reinsertion of Mirena IUD on 8/17/20  Doing well, no post insertion complications  States that her bleeding is lighter  Only concern is that she bleeds for up to 10 days and her  is not happy with that due to not being able to have relations  The following portions of the patient's history were reviewed and updated as appropriate: allergies, current medications, past family history, past medical history, past social history, past surgical history and problem list     Review of Systems   Constitutional: Negative for chills and fever  Respiratory: Negative for cough and shortness of breath  Genitourinary: Positive for vaginal bleeding  Negative for difficulty urinating, dysuria, frequency, menstrual problem, pelvic pain, urgency and vaginal discharge  Musculoskeletal: Negative for arthralgias and myalgias  Objective:      /78 (BP Location: Left arm, Patient Position: Sitting, Cuff Size: Standard)   Pulse 69   Temp 98 4 °F (36 9 °C) (Temporal)   Ht 5' 6" (1 676 m)   Wt 74 3 kg (163 lb 12 8 oz)   LMP 09/19/2020 (Exact Date)   SpO2 99%   BMI 26 44 kg/m²        Physical Exam  Constitutional:       Appearance: Normal appearance  She is well-developed and normal weight  She is not ill-appearing or diaphoretic  HENT:      Head: Normocephalic and atraumatic  Eyes:      Pupils: Pupils are equal, round, and reactive to light  Pulmonary:      Effort: Pulmonary effort is normal    Genitourinary:     General: Normal vulva  Exam position: Lithotomy position        Labia:         Right: No rash, tenderness, lesion or injury  Left: No rash, tenderness, lesion or injury  Vagina: No signs of injury and foreign body  Bleeding (small amount, menstrual) present  No vaginal discharge, erythema or tenderness  Cervix: No cervical motion tenderness, discharge or friability  Uterus: Not enlarged and not tender  Adnexa:         Right: No mass or tenderness  Left: No mass or tenderness  Comments: + IUD strings  Skin:     General: Skin is warm and dry  Neurological:      General: No focal deficit present  Mental Status: She is alert and oriented to person, place, and time  Psychiatric:         Mood and Affect: Mood normal          Behavior: Behavior normal          Thought Content:  Thought content normal          Judgment: Judgment normal

## 2020-10-02 ENCOUNTER — TELEPHONE (OUTPATIENT)
Dept: GASTROENTEROLOGY | Facility: CLINIC | Age: 45
End: 2020-10-02

## 2020-10-02 DIAGNOSIS — K59.04 CHRONIC IDIOPATHIC CONSTIPATION: Primary | ICD-10-CM

## 2020-10-02 RX ORDER — SODIUM, POTASSIUM,MAG SULFATES 17.5-3.13G
SOLUTION, RECONSTITUTED, ORAL ORAL
Qty: 2 BOTTLE | Refills: 0 | Status: SHIPPED | OUTPATIENT
Start: 2020-10-02 | End: 2020-10-06

## 2020-10-06 DIAGNOSIS — K59.04 CHRONIC IDIOPATHIC CONSTIPATION: Primary | ICD-10-CM

## 2020-10-06 RX ORDER — POLYETHYLENE GLYCOL 3350 17 G/17G
POWDER, FOR SOLUTION ORAL
Qty: 238 G | Refills: 0 | Status: SHIPPED | OUTPATIENT
Start: 2020-10-06 | End: 2020-10-09 | Stop reason: ALTCHOICE

## 2020-10-09 ENCOUNTER — ANESTHESIA (OUTPATIENT)
Dept: GASTROENTEROLOGY | Facility: MEDICAL CENTER | Age: 45
End: 2020-10-09

## 2020-10-09 ENCOUNTER — HOSPITAL ENCOUNTER (OUTPATIENT)
Dept: GASTROENTEROLOGY | Facility: MEDICAL CENTER | Age: 45
Setting detail: OUTPATIENT SURGERY
Discharge: HOME | End: 2020-10-09
Attending: INTERNAL MEDICINE | Admitting: INTERNAL MEDICINE
Payer: COMMERCIAL

## 2020-10-09 VITALS
SYSTOLIC BLOOD PRESSURE: 105 MMHG | OXYGEN SATURATION: 100 % | TEMPERATURE: 98.1 F | WEIGHT: 163 LBS | RESPIRATION RATE: 16 BRPM | BODY MASS INDEX: 26.2 KG/M2 | DIASTOLIC BLOOD PRESSURE: 67 MMHG | HEIGHT: 66 IN | HEART RATE: 72 BPM

## 2020-10-09 VITALS — HEART RATE: 67 BPM

## 2020-10-09 DIAGNOSIS — K57.92 DIVERTICULITIS: ICD-10-CM

## 2020-10-09 DIAGNOSIS — Z12.11 SCREENING FOR COLON CANCER: ICD-10-CM

## 2020-10-09 PROCEDURE — 45385 COLONOSCOPY W/LESION REMOVAL: CPT | Performed by: INTERNAL MEDICINE

## 2020-10-09 PROCEDURE — 88305 TISSUE EXAM BY PATHOLOGIST: CPT | Performed by: PATHOLOGY

## 2020-10-09 PROCEDURE — 43239 EGD BIOPSY SINGLE/MULTIPLE: CPT | Performed by: INTERNAL MEDICINE

## 2020-10-09 PROCEDURE — 88342 IMHCHEM/IMCYTCHM 1ST ANTB: CPT | Performed by: PATHOLOGY

## 2020-10-09 PROCEDURE — NC001 PR NO CHARGE: Performed by: INTERNAL MEDICINE

## 2020-10-09 PROCEDURE — 45380 COLONOSCOPY AND BIOPSY: CPT | Performed by: INTERNAL MEDICINE

## 2020-10-09 RX ORDER — SODIUM CHLORIDE 9 MG/ML
125 INJECTION, SOLUTION INTRAVENOUS CONTINUOUS
Status: DISCONTINUED | OUTPATIENT
Start: 2020-10-09 | End: 2020-10-13 | Stop reason: HOSPADM

## 2020-10-09 RX ORDER — LIDOCAINE HYDROCHLORIDE 20 MG/ML
INJECTION, SOLUTION EPIDURAL; INFILTRATION; INTRACAUDAL; PERINEURAL AS NEEDED
Status: DISCONTINUED | OUTPATIENT
Start: 2020-10-09 | End: 2020-10-09

## 2020-10-09 RX ORDER — PROPOFOL 10 MG/ML
INJECTION, EMULSION INTRAVENOUS AS NEEDED
Status: DISCONTINUED | OUTPATIENT
Start: 2020-10-09 | End: 2020-10-09

## 2020-10-09 RX ADMIN — Medication 40 MG: at 10:34

## 2020-10-09 RX ADMIN — PROPOFOL 50 MG: 10 INJECTION, EMULSION INTRAVENOUS at 10:37

## 2020-10-09 RX ADMIN — PROPOFOL 50 MG: 10 INJECTION, EMULSION INTRAVENOUS at 10:30

## 2020-10-09 RX ADMIN — PROPOFOL 50 MG: 10 INJECTION, EMULSION INTRAVENOUS at 10:15

## 2020-10-09 RX ADMIN — PROPOFOL 50 MG: 10 INJECTION, EMULSION INTRAVENOUS at 10:34

## 2020-10-09 RX ADMIN — SODIUM CHLORIDE 125 ML/HR: 0.9 INJECTION, SOLUTION INTRAVENOUS at 09:51

## 2020-10-09 RX ADMIN — PROPOFOL 50 MG: 10 INJECTION, EMULSION INTRAVENOUS at 10:26

## 2020-10-09 RX ADMIN — PROPOFOL 50 MG: 10 INJECTION, EMULSION INTRAVENOUS at 10:23

## 2020-10-09 RX ADMIN — PROPOFOL 50 MG: 10 INJECTION, EMULSION INTRAVENOUS at 10:45

## 2020-10-09 RX ADMIN — PROPOFOL 50 MG: 10 INJECTION, EMULSION INTRAVENOUS at 10:19

## 2020-10-09 RX ADMIN — PROPOFOL 100 MG: 10 INJECTION, EMULSION INTRAVENOUS at 10:14

## 2020-10-09 RX ADMIN — PROPOFOL 50 MG: 10 INJECTION, EMULSION INTRAVENOUS at 10:17

## 2020-10-09 RX ADMIN — PROPOFOL 50 MG: 10 INJECTION, EMULSION INTRAVENOUS at 10:40

## 2020-10-09 RX ADMIN — LIDOCAINE HYDROCHLORIDE 100 MG: 20 INJECTION, SOLUTION EPIDURAL; INFILTRATION; INTRACAUDAL; PERINEURAL at 10:14

## 2020-10-16 DIAGNOSIS — A04.8 H. PYLORI INFECTION: Primary | ICD-10-CM

## 2020-10-16 RX ORDER — AMOXICILLIN 500 MG/1
1000 CAPSULE ORAL EVERY 12 HOURS SCHEDULED
Qty: 56 CAPSULE | Refills: 0 | Status: SHIPPED | OUTPATIENT
Start: 2020-10-16 | End: 2020-10-30

## 2020-10-16 RX ORDER — OMEPRAZOLE 40 MG/1
40 CAPSULE, DELAYED RELEASE ORAL 2 TIMES DAILY
Qty: 28 CAPSULE | Refills: 0 | Status: SHIPPED | OUTPATIENT
Start: 2020-10-16 | End: 2022-01-12

## 2020-10-16 RX ORDER — CLARITHROMYCIN 500 MG/1
500 TABLET, COATED ORAL EVERY 12 HOURS SCHEDULED
Qty: 28 TABLET | Refills: 0 | Status: SHIPPED | OUTPATIENT
Start: 2020-10-16 | End: 2020-10-30

## 2020-10-19 ENCOUNTER — TELEPHONE (OUTPATIENT)
Dept: GASTROENTEROLOGY | Facility: MEDICAL CENTER | Age: 45
End: 2020-10-19

## 2020-10-31 ENCOUNTER — HOSPITAL ENCOUNTER (OUTPATIENT)
Dept: ULTRASOUND IMAGING | Facility: HOSPITAL | Age: 45
Discharge: HOME | End: 2020-10-31
Payer: COMMERCIAL

## 2020-10-31 DIAGNOSIS — N92.1 MENORRHAGIA WITH IRREGULAR CYCLE: ICD-10-CM

## 2020-10-31 PROCEDURE — 76856 US EXAM PELVIC COMPLETE: CPT

## 2020-10-31 PROCEDURE — 76830 TRANSVAGINAL US NON-OB: CPT

## 2020-11-05 ENCOUNTER — TELEPHONE (OUTPATIENT)
Dept: OBGYN CLINIC | Facility: CLINIC | Age: 45
End: 2020-11-05

## 2020-11-30 ENCOUNTER — LAB (OUTPATIENT)
Dept: LAB | Facility: HOSPITAL | Age: 45
End: 2020-11-30
Payer: COMMERCIAL

## 2020-11-30 DIAGNOSIS — A04.8 H. PYLORI INFECTION: ICD-10-CM

## 2020-11-30 PROCEDURE — 87338 HPYLORI STOOL AG IA: CPT

## 2020-12-01 ENCOUNTER — OFFICE VISIT (OUTPATIENT)
Dept: OBGYN CLINIC | Facility: CLINIC | Age: 45
End: 2020-12-01
Payer: COMMERCIAL

## 2020-12-01 VITALS
BODY MASS INDEX: 25.82 KG/M2 | WEIGHT: 160 LBS | SYSTOLIC BLOOD PRESSURE: 90 MMHG | DIASTOLIC BLOOD PRESSURE: 60 MMHG | HEART RATE: 82 BPM

## 2020-12-01 DIAGNOSIS — Z30.431 CONTRACEPTIVE, SURVEILLANCE, INTRAUTERINE DEVICE: Primary | ICD-10-CM

## 2020-12-01 LAB — H PYLORI AG STL QL IA: NEGATIVE

## 2020-12-01 PROCEDURE — 99212 OFFICE O/P EST SF 10 MIN: CPT | Performed by: NURSE PRACTITIONER

## 2020-12-10 ENCOUNTER — TELEPHONE (OUTPATIENT)
Dept: GASTROENTEROLOGY | Facility: CLINIC | Age: 45
End: 2020-12-10

## 2021-01-07 ENCOUNTER — ANNUAL EXAM (OUTPATIENT)
Dept: OBGYN CLINIC | Facility: CLINIC | Age: 46
End: 2021-01-07
Payer: COMMERCIAL

## 2021-01-07 VITALS
BODY MASS INDEX: 25.71 KG/M2 | DIASTOLIC BLOOD PRESSURE: 64 MMHG | HEART RATE: 68 BPM | SYSTOLIC BLOOD PRESSURE: 102 MMHG | OXYGEN SATURATION: 98 % | HEIGHT: 66 IN | WEIGHT: 160 LBS

## 2021-01-07 DIAGNOSIS — E58 DIETARY CALCIUM DEFICIENCY: ICD-10-CM

## 2021-01-07 DIAGNOSIS — Z12.31 VISIT FOR SCREENING MAMMOGRAM: ICD-10-CM

## 2021-01-07 DIAGNOSIS — Z30.431 CONTRACEPTIVE, SURVEILLANCE, INTRAUTERINE DEVICE: ICD-10-CM

## 2021-01-07 DIAGNOSIS — Z01.419 ENCOUNTER FOR ANNUAL ROUTINE GYNECOLOGICAL EXAMINATION: Primary | ICD-10-CM

## 2021-01-07 PROBLEM — R00.2 HEART PALPITATIONS: Status: RESOLVED | Noted: 2020-01-02 | Resolved: 2021-01-07

## 2021-01-07 PROBLEM — R00.2 PALPITATIONS: Status: RESOLVED | Noted: 2018-11-01 | Resolved: 2021-01-07

## 2021-01-07 PROCEDURE — 99396 PREV VISIT EST AGE 40-64: CPT | Performed by: OBSTETRICS & GYNECOLOGY

## 2021-01-07 NOTE — PROGRESS NOTES
Pt is a 39 y o  [id number] with Patient's last menstrual period was 2020  using Mirena for [facility] presents for preventive care  She notes the same partner since her last STI evaluation  In her lifetime she has been involved with 1 partner   Safe sexual practices (monogomy, condoms) are followed consistently  · She does  feel safe in the relationship  She does feel safe in her home  · Her calcium intake encompasses multivitamin, milk (cow, goat, almond, cashew, soy, etc) and yogurt for a total of 3-4 servings daily on average  She does take additional Vitamin D (MVI or supplement)  · She exercises 2-3 times per week  · Her menses occur every 28 Days, last 8-10 days and require regular pad every 7-8 hours  Pt reports periods much lighter after switching from Paragard to Mirena ,but still long  Pt has had in place <6 months  Advised to continue to observe, should begin to shorten  Menstrual History:  OB History        4    Para   4    Term   4            AB        Living   4       SAB        TAB        Ectopic        Multiple        Live Births               Obstetric Comments   FT C/S x 4    Menarche: 14    /8-10/regular pad every 8-10 hours--mostly spotting due to Mirena            Menarche age: 17  Patient's last menstrual period was 2020  Period Cycle (Days): 28  Period Duration (Days): 14  Period Pattern: (!) Irregular  Menstrual Flow: Light  Menstrual Control: Thin pad  ·      · She has never recieved an HPV vaccine  · tobacco use : does not use tobacco              · Colonoscopy:2017-diverticulosis, -2 benign polyps, repeat age 50  · Pap: 2018-wnl, HRHPV neg, repeat annual next year  · Mammogram: 2019-3D wnl, reports she has her next one scheduled  It is scheduled for march    · Mirena: inserted 2020    Past Medical History:   Diagnosis Date    Diverticulitis     Heart palpitations 2020    Varicella vaccination        Past Surgical History: Procedure Laterality Date     SECTION      x4    COLONOSCOPY      2 benign polyps repeat age 50    [place] (IUD)  2020    Mirena    MOUTH SURGERY      implant of tooth x 2    [place] COLONOSCOPY FLX DX W/COLLJ SPEC WHEN PFRMD N/A 5/15/2017    Procedure: COLONOSCOPY;  Surgeon: Pinky Guadarrama MD;  Location: [facility] GI LAB;   Service: Gastroenterology    REMOVAL OF INTRAUTERINE DEVICE (IUD)  2020    Paraguard    WISDOM TOOTH EXTRACTION         OB History    Para Term  AB Living   4 4 4     4   SAB TAB Ectopic Multiple Live Births                  # Outcome Date GA Lbr Isaias/2nd Weight Sex Delivery Anes PTL Lv   4 Term            3 Term            2 Term            1 Term               Obstetric Comments   FT C/S x 4      Menarche: 14      28/8-10/regular pad every 8-10 hours--mostly spotting due to Mirena          Current Outpatient Medications:     atenolol (TENORMIN) 25 mg tablet, TAKE ONE TABLET BY MOUTH EVERY DAY , Disp: 90 tablet, Rfl: 5    multivitamin (THERAGRAN) TABS, Take 1 tablet by mouth, Disp: , Rfl:     Vitamin D, Cholecalciferol, 1000 units CAPS, Take 1,000 Units by mouth, Disp: , Rfl:     omeprazole (PriLOSEC) 40 MG capsule, Take 1 capsule (40 mg total) by mouth 2 (two) times a day for 14 days, Disp: 28 capsule, Rfl: 0    No Known Allergies    Social History     Socioeconomic History    Marital status: /Civil Union     Spouse name: Shy Ramachandran Number of children: 4    Years of education: HS    Highest education level: None   Occupational History    Occupation: unemployed   Social Needs    Financial resource strain: None    Food insecurity     Worry: None     Inability: None    Transportation needs     Medical: None     Non-medical: None   Tobacco Use    Smoking status: Former Smoker    Smokeless tobacco: Never Used    Tobacco comment: Hooka   Substance and Sexual Activity    Alcohol use: No    Drug use: No    Sexual activity: Yes     Partners: Male     Birth control/protection: I U D       Comment: Lifetime patners; 1   Lifestyle    Physical activity     Days per week: None     Minutes per session: None    Stress: None   Relationships    Social connections     Talks on phone: None     Gets together: None     Attends Mosque service: None     Active member of club or organization: None     Attends meetings of clubs or organizations: None     Relationship status: None    Intimate partner violence     Fear of current or ex partner: None     Emotionally abused: None     Physically abused: None     Forced sexual activity: None   Other Topics Concern    None   Social History Narrative    Hindu: Yarsani    Accepts blood products        Exercise: 5x/week x 30 min    Calcium: Multivitamin daily, 1 c almond milk irregaular, 1 yogurt 3-4x/week        Family History   Problem Relation Age of Onset    Brain cancer Maternal Grandmother     Breast cancer Maternal Aunt 79    Uterine cancer Mother     Diabetes Mother     Hypertension Mother     No Known Problems Father     Fibroids Sister         hysterectomy    Other Sister         bilateral hip replacement    Hypertension Brother     Liver disease Sister         cyst on liver    No Known Problems Sister     No Known Problems Sister     No Known Problems Sister     No Known Problems Brother     Lung disease Brother     Hypertension Brother     No Known Problems Brother     Other Brother         palpitations    No Known Problems Half-Brother     Lung cancer Maternal Uncle     No Known Problems Daughter     No Known Problems Maternal Grandfather     Hypertension Paternal Grandmother     Diabetes Paternal Grandmother     No Known Problems Paternal Grandfather     No Known Problems Daughter     No Known Problems Daughter     Fibroids Maternal Aunt 40        hysterectomy    Ovarian cancer Neg Hx     Colon cancer Neg Hx        Blood pressure 102/64, pulse 68, height 5' 6" (1 676 m), weight 72 6 kg (160 lb), last menstrual period 12/14/2020, SpO2 98 %  and Body mass index is 25 82 kg/m²  Physical Exam  Constitutional:       Appearance: Normal appearance  She is well-developed  HENT:      Head: Normocephalic and atraumatic  Eyes:      Extraocular Movements: Extraocular movements intact  Conjunctiva/sclera: Conjunctivae normal    Neck:      Musculoskeletal: Normal range of motion and neck supple  Thyroid: No thyromegaly  Trachea: No tracheal deviation  Cardiovascular:      Rate and Rhythm: Normal rate and regular rhythm  Heart sounds: Normal heart sounds  Pulmonary:      Effort: Pulmonary effort is normal  No respiratory distress  Breath sounds: Normal breath sounds  No stridor  No wheezing or rales  Abdominal:      General: Bowel sounds are normal  There is no distension  Palpations: Abdomen is soft  There is no mass  Tenderness: There is no abdominal tenderness  There is no guarding or rebound  Hernia: No hernia is present  Musculoskeletal: Normal range of motion  General: No tenderness  Lymphadenopathy:      Cervical: No cervical adenopathy  Skin:     General: Skin is warm  Findings: No erythema or rash  Neurological:      Mental Status: She is alert and oriented to person, place, and time  Psychiatric:         Mood and Affect: Mood normal          Behavior: Behavior normal          Thought Content: Thought content normal          Judgment: Judgment normal          Breasts: breasts appear normal, no suspicious masses, no skin or nipple changes or axillary nodes, symmetric fibrous changes in both upper outer quadrants      vulva: normal external genitalia for age and no lesions, masses, epithelial changes, or exudate  vagina: color pink and rugae  well formed rugae  cervix: parous, no lesions  and  IUD string  3 5 cm  uterus: NSSC, AF, NT, mobile  adnexa: no masses or tenderness      A/P:  Pt is a 39 y o  [id number] with      Diagnoses and all orders for this visit:    Encounter for annual routine gynecological examination  -pap up to date, will repeat 1 year  -Patient advised recommendation of exercise 5 times per week for 30 minutes  Patient advised recommendation of BMI to be between 19-25  Visit for screening mammogram  -scheduled for march  Pt has prescription I gave her at last visit  Contraceptive, surveillance, intrauterine device  -IUD strings appropriately located    Dietary calcium deficiency  Patient advised recommendation of daily dietary calcium of 1000 mg calcium

## 2021-03-08 ENCOUNTER — HOSPITAL ENCOUNTER (OUTPATIENT)
Dept: MAMMOGRAPHY | Facility: CLINIC | Age: 46
Discharge: HOME | End: 2021-03-08
Payer: COMMERCIAL

## 2021-03-08 VITALS — BODY MASS INDEX: 26.03 KG/M2 | WEIGHT: 162 LBS | HEIGHT: 66 IN

## 2021-03-08 DIAGNOSIS — Z12.31 VISIT FOR SCREENING MAMMOGRAM: ICD-10-CM

## 2021-03-08 DIAGNOSIS — Z12.31 ENCOUNTER FOR SCREENING MAMMOGRAM FOR MALIGNANT NEOPLASM OF BREAST: ICD-10-CM

## 2021-03-08 PROCEDURE — 77067 SCR MAMMO BI INCL CAD: CPT

## 2021-03-08 PROCEDURE — 77063 BREAST TOMOSYNTHESIS BI: CPT

## 2021-04-01 ENCOUNTER — HOSPITAL ENCOUNTER (OUTPATIENT)
Dept: MAMMOGRAPHY | Facility: CLINIC | Age: 46
Discharge: HOME | End: 2021-04-01
Payer: COMMERCIAL

## 2021-04-01 ENCOUNTER — TELEPHONE (OUTPATIENT)
Dept: OBGYN CLINIC | Facility: CLINIC | Age: 46
End: 2021-04-01

## 2021-04-01 VITALS — HEIGHT: 66 IN | WEIGHT: 162 LBS | BODY MASS INDEX: 26.03 KG/M2

## 2021-04-01 DIAGNOSIS — R92.8 ABNORMAL MAMMOGRAM: ICD-10-CM

## 2021-04-01 PROCEDURE — 77065 DX MAMMO INCL CAD UNI: CPT

## 2021-04-01 PROCEDURE — G0279 TOMOSYNTHESIS, MAMMO: HCPCS

## 2021-04-01 PROCEDURE — 76642 ULTRASOUND BREAST LIMITED: CPT

## 2021-04-01 NOTE — TELEPHONE ENCOUNTER
I called the patient back and LMOM in Croatian explaining the findings on her mammogram and encouraged her to have the biopsy  Pt advised to call the office back if she had any questions

## 2021-04-01 NOTE — PROGRESS NOTES
Met with patient and Dr Quintin Sykes at [facility], [facility] regarding recommendation for;      _____ RIGHT ___x___LEFT      _____Ultrasound guided  ___x___Stereotactic  Breast biopsy  __x___Verbalized understanding  Blood thinners:  _____yes ___x__no    Date stopped: ___________    Biopsy teaching sheet given and reviewed with patient who verbalized understanding  Patient to arrive at [facility] at 0730 for an 0800 appointment on 4/15/21   Questions and concerns were addressed at this time :  ___x____yes ______no      Med Hx: HTN; GERD

## 2021-04-01 NOTE — TELEPHONE ENCOUNTER
Pt called in regards to her Mammogram, pt would like for Dr Christo Barone to explain in her language the need for a biopsy

## 2021-04-09 DIAGNOSIS — Z23 ENCOUNTER FOR IMMUNIZATION: ICD-10-CM

## 2021-04-15 ENCOUNTER — HOSPITAL ENCOUNTER (OUTPATIENT)
Dept: MAMMOGRAPHY | Facility: CLINIC | Age: 46
Discharge: HOME | End: 2021-04-15

## 2021-04-16 ENCOUNTER — OFFICE VISIT (OUTPATIENT)
Dept: CARDIOLOGY CLINIC | Facility: CLINIC | Age: 46
End: 2021-04-16
Payer: COMMERCIAL

## 2021-04-16 VITALS
BODY MASS INDEX: 26.47 KG/M2 | DIASTOLIC BLOOD PRESSURE: 62 MMHG | HEART RATE: 80 BPM | WEIGHT: 164 LBS | SYSTOLIC BLOOD PRESSURE: 110 MMHG

## 2021-04-16 DIAGNOSIS — I49.3 PREMATURE VENTRICULAR CONTRACTIONS: ICD-10-CM

## 2021-04-16 DIAGNOSIS — I49.3 PVC'S (PREMATURE VENTRICULAR CONTRACTIONS): ICD-10-CM

## 2021-04-16 DIAGNOSIS — R00.2 PALPITATIONS: Primary | ICD-10-CM

## 2021-04-16 PROCEDURE — 1036F TOBACCO NON-USER: CPT | Performed by: INTERNAL MEDICINE

## 2021-04-16 PROCEDURE — 99214 OFFICE O/P EST MOD 30 MIN: CPT | Performed by: INTERNAL MEDICINE

## 2021-04-16 RX ORDER — ATENOLOL 25 MG/1
25 TABLET ORAL DAILY
Qty: 90 TABLET | Refills: 5 | Status: SHIPPED | OUTPATIENT
Start: 2021-04-16 | End: 2022-06-13

## 2021-04-16 NOTE — PATIENT INSTRUCTIONS
CARDIOLOGY ASSESSMENT & PLAN:  1  Palpitations  atenolol (TENORMIN) 25 mg tablet   2  Premature ventricular contractions     3  PVC's (premature ventricular contractions)  atenolol (TENORMIN) 25 mg tablet     Premature ventricular contractions  Mrs Samantha Ruvalcaba is overall doing well from cardiac perspective with no recent symptoms that suggest symptomatic palpitations  Her heart rate and blood pressure well controlled  Her Holter monitor in 2020 revealed 3 9% PVC burden  There was no nonsustained or sustained ventricular tachycardia events  She is on atenolol therapy  Her examination is benign  -- at this time I am advising her to continue current medical therapy with atenolol 25 mg once daily  -- she would need at least once a year blood work including thyroid function tests, electrolytes and lipid profile  -- she is advised to continue normal activities and exercise regularly  -- she is advised to report any concerning symptoms such as chest pain, shortness of breath, decline in exercise tolerance or presyncope/syncope

## 2021-04-16 NOTE — PROGRESS NOTES
[facility]  [address] [facility], [place] [address]  Phone#  [phone]  Fax#  [phone]  *-*-*-*-*-*-*-*-*-*-*-*-*-*-*-*-*-*-*-*-*-*-*-*-*-*-*-*-*-*-*-*-*-*-*-*-*-*-*-*-*-*-*-*-*-*-*-*-*-*-*-*-*-*  ENCOUNTER DATE: 21 8:27 AM  PATIENT NAME: Marion Espino   1975    [id number]  Age: 42 y o  Sex: female  AUTHOR: Jose Antonio Logan MD  PRIMARYCARE PHYSICIAN: Marcelle Wan MD    DIAGNOSES:  1  Symptomatic premature ventricular contractions  2  History of  in the past  3  History of uterine implant  4  History of diverticulitis  5  Palpitations  6  Menorrhagia  7  Ovarian cyst  8  Colonic diverticulosis  9  H pylori infection involving distal esophagus     48 HOUR HOLTER MONITOR 2020:  Average heart rate was 79 beats per minute, minimum was 52 and maximum was 140 beats per minute  Predominant sinus rhythm with 3 9% PVC burden some of which were in the form ventricular bigeminy and ventricular trigeminy  There were 2 ventricular couplets  There was no nonsustained or sustained ventricular tachycardia  Supraventricular ectopy burden was 0 0%  No symptoms were reported  ECHOCARDIOGRAM 2018:  - Normal left ventricular cavity size, wall thickness and systolic and diastolic function  EF approximately 60-65%  - No significant chamber hypertrophy or enlargement  - Mild aortic valve sclerosis, no aortic valve stenosis or regurgitation  - mild mitral valve leaflet sclerosis and early changes of mitral annular calcification, trace mitral and tricuspid valve regurgitation   - No obvious pulmonary hypertension   - No pericardial effusion  Exercise treadmill stress test in 2018:  Stress results: Duration of exercise was 10 min and 30 sec  Target heart rate was achieved  There was no chest pain during stress  IMPRESSIONS: 1  Exercise treadmill stress test is negative for ischemia at 91% of patient's age predicted maximal heart rate    2  Good exercise capacity  3  Normal resting blood pressure and appropriate blood pressure response to exercise  4  No chest pain reported with exercise  5  No significant arrhythmia noted with exercise  CURRENT ECG:  No results found for this visit on 04/16/21  CARDIOLOGY ASSESSMENT & PLAN:  1  Palpitations  atenolol (TENORMIN) 25 mg tablet   2  Premature ventricular contractions     3  PVC's (premature ventricular contractions)  atenolol (TENORMIN) 25 mg tablet     Premature ventricular contractions  Mrs Doroteo Cedeno is overall doing well from cardiac perspective with no recent symptoms that suggest symptomatic palpitations  Her heart rate and blood pressure well controlled  Her Holter monitor in 2020 revealed 3 9% PVC burden  There was no nonsustained or sustained ventricular tachycardia events  She is on atenolol therapy  Her examination is benign  -- at this time I am advising her to continue current medical therapy with atenolol 25 mg once daily  -- she would need at least once a year blood work including thyroid function tests, electrolytes and lipid profile  -- she is advised to continue normal activities and exercise regularly  -- she is advised to report any concerning symptoms such as chest pain, shortness of breath, decline in exercise tolerance or presyncope/syncope  INTERVAL HISTORY & HISTORY OF PRESENT ILLNESS:  Doroteo Cedeno is here for follow-up regarding her cardiac comorbidities which include:  History of palpitations, PVCs  She is here for follow-up  She reports that overall she has been feeling well  She mentions that in October 2020 she underwent endoscopy which revealed presence of H pylori infection  She was treated for this and since then given her palpitations are much better  She rarely gets them and they not as long lasting as before  She continues to take atenolol 25 mg once daily  There have been no recent active symptoms of chest discomfort or exertional angina     There is no dyspnea with usual activities or exertion  No orthopnea, PND or pedal edema  No palpitations, lightheadedness, presyncope, or syncope  Denies any recent hospitalizations or other illnesses  Reports being compliant with medications  Functional capacity status:  Good   (Excellent- >10 METs; Good: (7-10 METs); Moderate (4-7 METs); Poor (<= 4 METs)    Any chronic stressors:  None   (feeling of poor health, financial problems, and social isolation etc)  Tobacco or alcohol dependence:  None      REVIEW OF SYMPTOMS:    Positive for:  Occasional mild palpitations  Negative for: All remaining as reviewed below and in HPI  SYSTEM SYMPTOMS REVIEWED:  General--weight change, fever, night sweats  Respiratory--cough, wheezing, shortness of breath, sputum production  Cardiovascular--chest pain, syncope, dyspnea on exertion, edema, decline in exercise tolerance, claudication   Gastrointestinal--persistent vomiting, diarrhea, abdominal distention, blood in stool   Muscular or skeletal--joint pain or swelling   Neurologic--headaches, syncope, abnormal movement  Hematologic--history of easy bruising and bleeding   Endocrine--thyroid enlargement, heat or cold intolerance, polyuria   Psychiatric--anxiety, depression     *-*-*-*-*-*-*-*-*-*-*-*-*-*-*-*-*-*-*-*-*-*-*-*-*-*-*-*-*-*-*-*-*-*-*-*-*-*-*-*-*-*-*-*-*-*-*-*-*-*-*-*-*-*-  VITAL SIGNS:  Vitals:    04/16/21 0806   BP: 110/62   BP Location: Left arm   Patient Position: Sitting   Cuff Size: Adult   Pulse: 80   Weight: 74 4 kg (164 lb)     Weight (last 2 days)     Date/Time   Weight    04/16/21 0806   74 4 (164)           ,   Wt Readings from Last 3 Encounters:   04/16/21 74 4 kg (164 lb)   04/01/21 73 5 kg (162 lb)   03/08/21 73 5 kg (162 lb)    , Body mass index is 26 47 kg/m²      *-*-*-*-*-*-*-*-*-*-*-*-*-*-*-*-*-*-*-*-*-*-*-*-*-*-*-*-*-*-*-*-*-*-*-*-*-*-*-*-*-*-*-*-*-*-*-*-*-*-*-*-*-*-  PHYSICAL EXAM:  General Appearance:    Alert, cooperative, no distress, appears stated age   Head, Eyes, ENT:    No obvious abnormality, moist mucous mebranes  Neck:   Supple, no carotid bruit or JVD   Back:     Symmetric, no curvature  Lungs:     Respirations unlabored  Clear to auscultation bilaterally,    Chest wall:    No tenderness or deformity   Heart:    Regular rate and rhythm, S1 and S2 normal, no murmur, rub  or gallop  Abdomen:     Soft, non-tender, No obvious masses, or organomegaly   Extremities:   Extremities warm, no cyanosis or edema    Skin:   Skin color, texture, turgor normal, no rashes or lesions     *-*-*-*-*-*-*-*-*-*-*-*-*-*-*-*-*-*-*-*-*-*-*-*-*-*-*-*-*-*-*-*-*-*-*-*-*-*-*-*-*-*-*-*-*-*-*-*-*-*-*-*-*-*-  CURRENT MEDICATION LIST:    Current Outpatient Medications:     atenolol (TENORMIN) 25 mg tablet, Take 1 tablet (25 mg total) by mouth daily, Disp: 90 tablet, Rfl: 5    multivitamin (THERAGRAN) TABS, Take 1 tablet by mouth, Disp: , Rfl:     omeprazole (PriLOSEC) 40 MG capsule, Take 1 capsule (40 mg total) by mouth 2 (two) times a day for 14 days, Disp: 28 capsule, Rfl: 0    Vitamin D, Cholecalciferol, 1000 units CAPS, Take 1,000 Units by mouth, Disp: , Rfl:     ALLERGIES:  No Known Allergies    *-*-*-*-*-*-*-*-*-*-*-*-*-*-*-*-*-*-*-*-*-*-*-*-*-*-*-*-*-*-*-*-*-*-*-*-*-*-*-*-*-*-*-*-*-*-*-*-*-*-*-*-*-*-  The LABORATORY DATA:  I have personally reviewed pertinent labs      No results found for: NA  Potassium   Date Value Ref Range Status   11/05/2019 4 3 3 6 - 5 0 mmol/L Final     Chloride   Date Value Ref Range Status   11/05/2019 103 97 - 108 mmol/L Final     CO2   Date Value Ref Range Status   11/05/2019 29 22 - 30 mmol/L Final     BUN   Date Value Ref Range Status   11/05/2019 9 5 - 25 mg/dL Final     Creatinine   Date Value Ref Range Status   11/05/2019 0 67 0 60 - 1 20 mg/dL Final     Comment:     Standardized to IDMS reference method     eGFR   Date Value Ref Range Status   11/05/2019 109 >60 ml/min/1 73sq m Final     Calcium   Date Value Ref Range Status   2019 9 3 8 4 - 10 2 mg/dL Final     AST   Date Value Ref Range Status   2019 21 14 - 36 U/L Final     Comment:       Specimen collection should occur prior to Sulfasalazine administration due to the potential for falsely depressed results  ALT   Date Value Ref Range Status   2019 22 9 - 52 U/L Final     Comment:       Specimen collection should occur prior to Sulfasalazine administration due to the potential for falsely depressed results        Alkaline Phosphatase   Date Value Ref Range Status   2019 49 43 - 122 U/L Final     WBC   Date Value Ref Range Status   2020 8 10 4 50 - 11 00 Thousand/uL Final   2019 9 30 4 50 - 11 00 Thousand/uL Final     Hemoglobin   Date Value Ref Range Status   2020 12 7 12 0 - 16 0 g/dL Final   2019 12 8 12 0 - 16 0 g/dL Final     Platelets   Date Value Ref Range Status   2020 261 150 - 450 Thousands/uL Final   2019 264 150 - 450 Thousands/uL Final     No results found for: PT, PTT, INR  No results found for: CKMB, DIGOXIN  No results found for: TSH  No results found for: CHOL   No results found for: HGBA1C  No results found for: Algie Curling, GRAMSTAIN, URINECX, WOUNDCULT, BODYFLUIDCUL, MRSACULTURE, INFLUAPCR, INFLUBPCR, RSVPCR, LEGIONELLAUR, CDIFFTOXINB    *-*-*-*-*-*-*-*-*-*-*-*-*-*-*-*-*-*-*-*-*-*-*-*-*-*-*-*-*-*-*-*-*-*-*-*-*-*-*-*-*-*-*-*-*-*-*-*-*-*-*-*-*-*-  PREVIOUS CARDIOLOGY & RADIOLOGY RESULTS:  Results for orders placed during the hospital encounter of 18   Echo complete with contrast if indicated    Narrative [facility]  [address] [place] [address]    Transthoracic Echocardiogram  2D, M-mode, Doppler, and Color Doppler    Study date:  2018    Patient: Humberto Chavez  MR number: [id number]  Account number: [de-identified]  : 1975  Age: 41 years  Gender: Female  Status: Outpatient  Location: [address] [place] [address]  Height: 67 in  Weight: 160 lb  BP: 108/ 70 mmHg    Indications: Tachycardia, palpitations    Diagnoses: R00 0 - Tachycardia, unspecified    Sonographer:  Ree Watson CCT,RCS  Primary Physician:  Karla Westbrook MD  Referring Physician:  Deann Euceda MD  [facility]  Interpreting Physician:  Deann Euceda MD    IMPRESSIONS:  - Normal left ventricular cavity size, wall thickness and systolic and diastolic function  EF approximately 60-65%  - No significant chamber hypertrophy or enlargement  - Mild aortic valve sclerosis, no aortic valve stenosis or regurgitation  - mild mitral valve leaflet sclerosis and early changes of mitral annular calcification, trace mitral and tricuspid valve regurgitation   - No obvious pulmonary hypertension   - No pericardial effusion  No previous echocardiogram is available for comparison  SUMMARY    LEFT VENTRICLE:  Normal left ventricular cavity size, normal wall thickness, normal left ventricle systolic function and wall motion  Ejection fraction is estimated as around 60-65%  Normal diastolic function  RIGHT VENTRICLE:  Normal right ventricular size and systolic function  Normal estimated right ventricular systolic pressure  LEFT ATRIUM:  Normal left atrial cavity size  Intact interatrial septum  RIGHT ATRIUM:  Normal right atrial cavity size  MITRAL VALVE:  Mild mitral valve leaflet sclerosis, early changes of mitral annular calcification  Trace mitral valve regurgitation  AORTIC VALVE:  Tricuspid aortic valve with mild sclerosis  No aortic valve stenosis or regurgitation  TRICUSPID VALVE:  Trace tricuspid valve regurgitation  PULMONIC VALVE:  No significant pulmonic valve regurgitation  AORTA:  Aortic root and proximal ascending aorta are normal in size on 2D imaging  IVC, HEPATIC VEINS:  Inferior vena cava is normal in size and demonstrates appropriate respiratory phasic changes in diameter      PERICARDIUM:  No pericardial effusion  HISTORY: PRIOR HISTORY: Palpitations, former smoker    PROCEDURE: The study was performed in the [address] [place]  This was a routine study  The transthoracic approach was used  The study included complete 2D imaging, M-mode, complete spectral Doppler, and color Doppler  The heart rate was  72 bpm, at the start of the study  Images were obtained from the parasternal, apical, subcostal, and suprasternal notch acoustic windows  Image quality was adequate  LEFT VENTRICLE: Normal left ventricular cavity size, normal wall thickness, normal left ventricle systolic function and wall motion  Ejection fraction is estimated as around 60-65%  Normal diastolic function  RIGHT VENTRICLE: Normal right ventricular size and systolic function  Normal estimated right ventricular systolic pressure  LEFT ATRIUM: Normal left atrial cavity size  Intact interatrial septum  RIGHT ATRIUM: Normal right atrial cavity size  MITRAL VALVE: Mild mitral valve leaflet sclerosis, early changes of mitral annular calcification  Trace mitral valve regurgitation  AORTIC VALVE: Tricuspid aortic valve with mild sclerosis  No aortic valve stenosis or regurgitation  TRICUSPID VALVE: Trace tricuspid valve regurgitation  PULMONIC VALVE: No significant pulmonic valve regurgitation  PERICARDIUM: No pericardial effusion  AORTA: Aortic root and proximal ascending aorta are normal in size on 2D imaging  SYSTEMIC VEINS: IVC: Inferior vena cava is normal in size and demonstrates appropriate respiratory phasic changes in diameter      SYSTEM MEASUREMENT TABLES    2D  %FS: 32 43 %  Ao Diam: 2 76 cm  EDV(Teich): 84 73 ml  EF(Teich): 61 01 %  ESV(Teich): 33 04 ml  IVSd: 0 93 cm  LA Area: 14 46 cm2  LA Diam: 2 87 cm  LVEDV MOD A4C: 98 34 ml  LVEF MOD A4C: 64 89 %  LVESV MOD A4C: 34 52 ml  LVIDd: 4 34 cm  LVIDs: 2 93 cm  LVLd A4C: 8 36 cm  LVLs A4C: 6 8 cm  LVPWd: 0 86 cm  RA Area: 9 46 cm2  RVIDd: 3 02 cm  SV MOD A4C: 63 81 ml  SV(Teich): 51 7 ml    CW  AV Vmax: 1 62 m/s  AV maxPG: 10 44 mmHg  RAP: 10 mmHg  TR Vmax: 2 27 m/s  TR maxP 6 mmHg    PW  E': 0 15 m/s  E' Sept: 0 15 m/s  E/E': 5 13  E/E' Sept: 5 05  LVOT Vmax: 0 96 m/s  LVOT maxPG: 3 69 mmHg  MV A Ramsey: 0 65 m/s  MV Dec Glascock: 3 72 m/s2  MV DecT: 207 9 ms  MV E Ramsey: 0 77 m/s  MV E/A Ratio: 1 19  MV PHT: 60 29 ms  MVA By PHT: 3 65 cm2  PAEDP: 15 56 mmHg  PRend P 56 mmHg  PRend Vmax: 1 18 m/s  RVSP: 30 6 mmHg    [employer] [employer] Accredited Echocardiography [facility]    Prepared and electronically signed by    Nathalia Smith MD  Signed [id number] 10:30:11       No results found for this or any previous visit  No results found for this or any previous visit  No results found for this or any previous visit  Mammo diagnostic left w 3d & cad, US breast left limited (diagnostic)  Narrative: DIAGNOSIS: Abnormal mammogram     TECHNIQUE:   Digital diagnostic mammography was performed  Computer Aided Detection   (CAD) analyzed all applicable images  Ultrasound of the left breast(s) was performed  COMPARISONS: Prior breast imaging dated: 2021, 2019,   2018, 2017, and 2017    RELEVANT HISTORY:   Family Breast Cancer History: History of breast cancer in Maternal Aunt  Family Medical History: Family medical history includes breast cancer in   maternal aunt  Personal History: Hormone history includes birth control  No known   relevant surgical history  No known relevant medical history  RISK ASSESSMENT:   5 Year Tyrer-Cuzick: 1 47 %  10 Year Tyrer-Cuzick: 3 45 %  Lifetime Tyrer-Cuzick: 19 39 %    TISSUE DENSITY:   The breasts are heterogeneously dense, which may obscure small masses  INDICATION: Lauren Salmeron is a 42 y o  female presenting for abnormal   screening      FINDINGS:   LEFT  1) CALCIFICATIONS: There are amorphous calcifications in a grouped   distribution seen in the upper outer quadrant of the left breast at 2 o'clock in the posterior depth  Calcifications are suspicious in   appearance and stereotactic biopsy is recommended  Two adjacent well-circumscribed masses in the slightly upper outer left   breast mid to posterior 3rd in depth persist on diagnostic tomographic   evaluation  Targeted sonographic evaluation of the 2 o'clock position   left breast 6 cm from the nipple demonstrate adjacent septated cyst   measuring 19 and 11 mm in maximum dimension respectively  No additional   follow-up of the cyst is required  Impression: 1  Amorphous calcifications in the upper outer quadrant left breast for   which stereotactic biopsy is recommended  2   Left-sided breast cysts  ASSESSMENT/BI-RADS CATEGORY:  Left: 4 - Suspicious  Overall: 4 - Suspicious    RECOMMENDATION:       - Stereotactic breast biopsy for the left breast     Workstation ID: [id number]         *-*-*-*-*-*-*-*-*-*-*-*-*-*-*-*-*-*-*-*-*-*-*-*-*-*-*-*-*-*-*-*-*-*-*-*-*-*-*-*-*-*-*-*-*-*-*-*-*-*-*-*-*-*-  SIGNATURES:   @TKN@   Estela Martines MD     *-*-*-*-*-*-*-*-*-*-*-*-*-*-*-*-*-*-*-*-*-*-*-*-*-*-*-*-*-*-*-*-*-*-*-*-*-*-*-*-*-*-*-*-*-*-*-*-*-*-*-*-*-*-    PAST MEDICAL HISTORY:  Past Medical History:   Diagnosis Date    Diverticulitis     Heart palpitations 2020    Varicella vaccination     PAST SURGICAL HISTORY:   Past Surgical History:   Procedure Laterality Date     SECTION      x4    COLONOSCOPY      2 benign polyps repeat age 50   Alvarez Racheal INSERTION OF INTRAUTERINE DEVICE (IUD)  2020    Mirena    MOUTH SURGERY      implant of tooth x 2    KY COLONOSCOPY FLX DX W/COLLJ SPEC WHEN PFRMD N/A 5/15/2017    Procedure: COLONOSCOPY;  Surgeon: Davis You MD;  Location: [facility];   Service: Gastroenterology    REMOVAL OF INTRAUTERINE DEVICE (IUD)  2020    Paraguard    WISDOM TOOTH EXTRACTION           FAMILY HISTORY:  Family History   Problem Relation Age of Onset    Brain cancer Maternal Grandmother     Breast cancer Maternal Aunt 79    Uterine cancer Mother     Diabetes Mother     Hypertension Mother     No Known Problems Father     Fibroids Sister         hysterectomy    Other Sister         bilateral hip replacement    Hypertension Brother     Liver disease Sister         cyst on liver    No Known Problems Sister     No Known Problems Sister     No Known Problems Sister     No Known Problems Brother     Lung disease Brother     Hypertension Brother     No Known Problems Brother     Other Brother         palpitations    No Known Problems Half-Brother     Lung cancer Maternal Uncle     No Known Problems Daughter     No Known Problems Maternal Grandfather     Hypertension Paternal Grandmother     Diabetes Paternal Grandmother     No Known Problems Paternal Grandfather     No Known Problems Daughter     No Known Problems Daughter     Fibroids Maternal Aunt 36        hysterectomy    Ovarian cancer Neg Hx     Colon cancer Neg Hx     SOCIAL HISTORY:  Social History     Tobacco Use   Smoking Status Former Smoker   Smokeless Tobacco Never Used   Tobacco Comment    Hooka      Social History     Substance and Sexual Activity   Alcohol Use No     Social History     Substance and Sexual Activity   Drug Use No    [id number]     *-*-*-*-*-*-*-*-*-*-*-*-*-*-*-*-*-*-*-*-*-*-*-*-*-*-*-*-*-*-*-*-*-*-*-*-*-*-*-*-*-*-*-*-*-*-*-*-*-*-*-*-*-*  ALLERGIES:  No Known Allergies CURRENT SCHEDULED MEDICATIONS:    Current Outpatient Medications:     atenolol (TENORMIN) 25 mg tablet, Take 1 tablet (25 mg total) by mouth daily, Disp: 90 tablet, Rfl: 5    multivitamin (THERAGRAN) TABS, Take 1 tablet by mouth, Disp: , Rfl:     omeprazole (PriLOSEC) 40 MG capsule, Take 1 capsule (40 mg total) by mouth 2 (two) times a day for 14 days, Disp: 28 capsule, Rfl: 0    Vitamin D, Cholecalciferol, 1000 units CAPS, Take 1,000 Units by mouth, Disp: , Rfl: *-*-*-*-*-*-*-*-*-*-*-*-*-*-*-*-*-*-*-*-*-*-*-*-*-*-*-*-*-*-*-*-*-*-*-*-*-*-*-*-*-*-*-*-*-*-*-*-*-*-*-*-*-*

## 2021-04-16 NOTE — ASSESSMENT & PLAN NOTE
Mrs Tyrone Hassan is overall doing well from cardiac perspective with no recent symptoms that suggest symptomatic palpitations  Her heart rate and blood pressure well controlled  Her Holter monitor in 2020 revealed 3 9% PVC burden  There was no nonsustained or sustained ventricular tachycardia events  She is on atenolol therapy  Her examination is benign  -- at this time I am advising her to continue current medical therapy with atenolol 25 mg once daily  -- she would need at least once a year blood work including thyroid function tests, electrolytes and lipid profile  -- she is advised to continue normal activities and exercise regularly  -- she is advised to report any concerning symptoms such as chest pain, shortness of breath, decline in exercise tolerance or presyncope/syncope

## 2021-04-21 ENCOUNTER — HOSPITAL ENCOUNTER (OUTPATIENT)
Dept: MAMMOGRAPHY | Facility: CLINIC | Age: 46
Discharge: HOME | End: 2021-04-21
Payer: COMMERCIAL

## 2021-04-21 VITALS
SYSTOLIC BLOOD PRESSURE: 98 MMHG | HEART RATE: 70 BPM | HEIGHT: 66 IN | BODY MASS INDEX: 26.36 KG/M2 | DIASTOLIC BLOOD PRESSURE: 58 MMHG | WEIGHT: 164 LBS

## 2021-04-21 DIAGNOSIS — R92.8 ABNORMAL MAMMOGRAM: ICD-10-CM

## 2021-04-21 PROCEDURE — A4648 IMPLANTABLE TISSUE MARKER: HCPCS

## 2021-04-21 PROCEDURE — 88305 TISSUE EXAM BY PATHOLOGIST: CPT | Performed by: PATHOLOGY

## 2021-04-21 PROCEDURE — 19081 BX BREAST 1ST LESION STRTCTC: CPT

## 2021-04-21 RX ORDER — LIDOCAINE HYDROCHLORIDE 10 MG/ML
5 INJECTION, SOLUTION EPIDURAL; INFILTRATION; INTRACAUDAL; PERINEURAL ONCE
Status: COMPLETED | OUTPATIENT
Start: 2021-04-21 | End: 2021-04-21

## 2021-04-21 RX ORDER — LIDOCAINE HYDROCHLORIDE AND EPINEPHRINE BITARTRATE 20; .01 MG/ML; MG/ML
10 INJECTION, SOLUTION SUBCUTANEOUS ONCE
Status: COMPLETED | OUTPATIENT
Start: 2021-04-21 | End: 2021-04-21

## 2021-04-21 RX ADMIN — LIDOCAINE HYDROCHLORIDE AND EPINEPHRINE 10 ML: 20; 10 INJECTION, SOLUTION INFILTRATION; PERINEURAL at 09:36

## 2021-04-21 RX ADMIN — LIDOCAINE HYDROCHLORIDE 5 ML: 10 INJECTION, SOLUTION EPIDURAL; INFILTRATION; INTRACAUDAL; PERINEURAL at 09:36

## 2021-04-21 NOTE — PROGRESS NOTES
Ice pack given:    ___x__yes _____no    Discharge instructions signed by patient:    _x____yes _____no    Discharge instructions given to patient:    __x___yes _____no    Discharged via:    __x___ambulatory    _____wheelchair    _____stretcher    Stable on discharge:    __x___yes ____no

## 2021-04-21 NOTE — PROGRESS NOTES
Procedure type:    _____ultrasound guided __x___stereotactic    Breast:    __x___Left _____Right     Location: 200    Needle: 8g Revolve    # of passes: 5 cores (3 cores with calcs, 2 cores without calcs)    Clip: Mammomark Triple Twist    Performed by: Dr Amy Ferreira held for 5 minutes by: Verenice Anglin    Sterrachelle Strips:    __x___yes _____no    Band aid:    __x___yes_____no    Tape and guaze:    _____yes __x___no    Tolerated procedure:    __x___yes _____no

## 2021-04-21 NOTE — DISCHARGE INSTR - OTHER ORDERS
POST LARGE CORE BREAST BIOPSY PATIENT INFORMATION      1  Place an ice pack inside your bra over the top of the dressing every hour for 20 minutes (20 minutes on, 60 minutes off)  Do this until bedtime  2  Do not shower or bathe until the following morning  3  You may bathe your breast carefully with the steri-strips in place  Be careful    Not to loosen them  The steri-strips will fall off in 3-5 days  4  You may have mild discomfort, and you may have some bruising where the   Needle entered the skin  This should clear within 5-7 days  5  If you need medicine for discomfort, take acetaminophen products such as   Tylenol  You may also take Advil or Motrin products  6  Do not participate in strenuous activities such as-tennis, aerobics, skiing,  Weight lifting, etc  for 24 hours  Refrain from swimming/soaking for 72 hours  7  Wearing a bra for sleeping may be more comfortable for the first 24-48 hours  8  Watch for continued bleeding, pain or fever over 101; please call with any questions or concerns  For procedures done at the [facility] call:  [facility]ushar Zhu RN at [facility] RN at [phone]                    *After 4 PM call the Interventional Radiology Department                    [phone] and ask to speak with the nurse on call  For procedures done at the [address] [place] [address] call:         Vidhya Palma RN at   *After 4 PM call the Interventional Radiology Department   [phone] and ask to speak with the nurse on call  For procedures done at [address] [place] [address] call: The Radiology Nurse at [phone]  *After 4 PM call your physician, or go to the Emergency Department  9          The final results of your biopsy are usually available within one week

## 2021-04-22 NOTE — PROGRESS NOTES
Post procedure call completed 4/22/21 at 1052    Bleeding: _____yes __X___no    Pain: _____yes ___X___no    Redness/Swelling: ______yes ___X___no    Band aid removed: __X___yes _____no    Steri-Strips intact: ___X___yes _____no (discussed with patient to remove steri strips on day 5 if they have not come off on their own)    Pt with no questions at this time, adv will call when results available, adv to call with any questions or concerns, has name/# for contact

## 2021-04-23 ENCOUNTER — IMMUNIZATIONS (OUTPATIENT)
Dept: FAMILY MEDICINE CLINIC | Facility: HOSPITAL | Age: 46
End: 2021-04-23

## 2021-04-23 ENCOUNTER — TELEPHONE (OUTPATIENT)
Dept: MAMMOGRAPHY | Facility: CLINIC | Age: 46
End: 2021-04-23

## 2021-04-23 DIAGNOSIS — Z23 ENCOUNTER FOR IMMUNIZATION: Primary | ICD-10-CM

## 2021-04-23 PROCEDURE — 91301 SARS-COV-2 / COVID-19 MRNA VACCINE (MODERNA) 100 MCG: CPT

## 2021-04-23 PROCEDURE — 0011A SARS-COV-2 / COVID-19 MRNA VACCINE (MODERNA) 100 MCG: CPT

## 2021-04-23 NOTE — TELEPHONE ENCOUNTER
Pt called to provide breast biopsy results (which were called to patient by Dr Christo Barone on 4/22)  Pt notified of recommendation to return to routine screening  While talking with patient, clarification of wound closure was made with patient  Pt was under the impression stitches were placed, adv that only steri stips were placed and these will fall off in 3-5 days  Verbalized understanding  Reinforced need for yearly mammogram and states [facility]  No additional questions  Has name and number for any additional or future needs

## 2021-05-24 ENCOUNTER — IMMUNIZATIONS (OUTPATIENT)
Dept: FAMILY MEDICINE CLINIC | Facility: HOSPITAL | Age: 46
End: 2021-05-24

## 2021-05-24 DIAGNOSIS — Z23 ENCOUNTER FOR IMMUNIZATION: Primary | ICD-10-CM

## 2021-05-24 PROCEDURE — 91301 SARS-COV-2 / COVID-19 MRNA VACCINE (MODERNA) 100 MCG: CPT

## 2021-05-24 PROCEDURE — 0012A SARS-COV-2 / COVID-19 MRNA VACCINE (MODERNA) 100 MCG: CPT

## 2021-07-02 ENCOUNTER — CLINICAL SUPPORT (OUTPATIENT)
Dept: DENTISTRY | Facility: CLINIC | Age: 46
End: 2021-07-02

## 2021-07-02 VITALS — TEMPERATURE: 98.4 F | HEART RATE: 67 BPM | DIASTOLIC BLOOD PRESSURE: 42 MMHG | SYSTOLIC BLOOD PRESSURE: 100 MMHG

## 2021-07-02 DIAGNOSIS — Z01.21 ENCOUNTER FOR DENTAL EXAMINATION AND CLEANING WITH ABNORMAL FINDINGS: Primary | ICD-10-CM

## 2021-07-02 DIAGNOSIS — Z00.00 ENCOUNTER FOR SCREENING AND PREVENTATIVE CARE: ICD-10-CM

## 2021-07-02 PROCEDURE — D0274 BITEWINGS - 4 RADIOGRAPHIC IMAGES: HCPCS

## 2021-07-02 PROCEDURE — D1330 ORAL HYGIENE INSTRUCTIONS: HCPCS

## 2021-07-02 PROCEDURE — D1110 PROPHYLAXIS - ADULT: HCPCS

## 2021-07-02 PROCEDURE — D0120 PERIODIC ORAL EVALUATION - ESTABLISHED PATIENT: HCPCS | Performed by: DENTIST

## 2021-07-02 NOTE — PROGRESS NOTES
Prophy    Dental procedures in this visit     - PERIODIC ORAL EVALUATION - ESTABLISHED PATIENT (Completed)     Service provider: Joy Zamudio DMD     Billing provider: Dru Hale, [address] (Completed)     Service provider: Real Lopez     Billing provider: Dru Hale DDS     - PROPHYLAXIS - ADULT (Completed)     Service provider: Real Lopez     Billing provider: Dru Hale DDS     - ORAL HYGIENE INSTRUCTIONS (Completed)     Service provider: Real Lopez     Billing provider: Dru Hale DDS     CC: none    Method Used:  · Prophy Method Used: Hand Scaling  · Polished  · Flossed    Radiographs Taken:  · Bitewings x4    Intra/Extra Oral Cancer Screening:  · Within normal limits      Oral Hygiene:  · Fair    Plaque:  · Generalized  · Light    Calculus:  · Localized  · Light  · Lower anteriors    Bleeding:  · Bleeding on probing: No periodontal exam for this visit  · Generalized  · Light    Stain:  · Localized  · Light    Periodontal Charting:  · Spot probing    Periodontal Classification:  · Generalized  · Mild  · Gingivitis      Nutritional Counseling:  · N/A    OHI: reviewed importance of flossing daily  Real Lopez RDH     No orders of the defined types were placed in this encounter  Periodic Exam:  Dr Jazzy Rueda exam    -Decay present around #4 under existing bridge  Patient must return for caries excavation on #4 to determine if tooth #4 is restorable or not  Try to make new bridge if #4 is restorable  Bridge has slight mobility     -May need a flipper if #4 is not restorable while we wait for patient to decide if she would be interested in an implant at site of #5- to be determine upon removal of bridge on UR  -No decay anywhere else  -Pt is to remain on 6 mrc with hygiene  NV: #4 caries excavation, remove bridge and determine restorability    NV2: [address] Annetta Krystal

## 2021-08-02 ENCOUNTER — TELEPHONE (OUTPATIENT)
Dept: OBGYN CLINIC | Facility: CLINIC | Age: 46
End: 2021-08-02

## 2021-08-02 NOTE — TELEPHONE ENCOUNTER
Pt called asking for Dr Lazarus Rain to call her to speak about a problem she is having and due to language barrier she would like for provider to call her

## 2021-08-03 ENCOUNTER — PROCEDURE VISIT (OUTPATIENT)
Dept: OBGYN CLINIC | Facility: CLINIC | Age: 46
End: 2021-08-03
Payer: COMMERCIAL

## 2021-08-03 ENCOUNTER — TELEPHONE (OUTPATIENT)
Dept: OBGYN CLINIC | Facility: CLINIC | Age: 46
End: 2021-08-03

## 2021-08-03 VITALS
DIASTOLIC BLOOD PRESSURE: 76 MMHG | SYSTOLIC BLOOD PRESSURE: 118 MMHG | BODY MASS INDEX: 27 KG/M2 | HEIGHT: 66 IN | WEIGHT: 168 LBS

## 2021-08-03 DIAGNOSIS — Z30.433 ENCOUNTER FOR REMOVAL AND REINSERTION OF INTRAUTERINE CONTRACEPTIVE DEVICE (IUD): Primary | ICD-10-CM

## 2021-08-03 PROCEDURE — 58301 REMOVE INTRAUTERINE DEVICE: CPT | Performed by: OBSTETRICS & GYNECOLOGY

## 2021-08-03 PROCEDURE — 58300 INSERT INTRAUTERINE DEVICE: CPT | Performed by: OBSTETRICS & GYNECOLOGY

## 2021-08-03 RX ORDER — COPPER 313.4 MG/1
1 INTRAUTERINE DEVICE INTRAUTERINE ONCE
Status: COMPLETED | OUTPATIENT
Start: 2021-08-03 | End: 2021-08-03

## 2021-08-03 RX ADMIN — COPPER 1 INTRA UTERINE DEVICE: 313.4 INTRAUTERINE DEVICE INTRAUTERINE at 13:09

## 2021-08-03 NOTE — TELEPHONE ENCOUNTER
----- Message from Carlos Malik sent at 8/3/2021 11:02 AM EDT -----  Regarding: FW: Prescription Question  Contact: [phone]    ----- Message -----  From: Alize Winston  Sent: 7/31/2021   2:02 PM EDT  To: [facility]  Subject: Prescription Question                            Hi Dr Naseem Garcia my name is CostPrize  Last year I started birth control Mirena, but two months ago I started to gain weight, feel swelling abs and my thighs , I feel headache and nervous  Can use birth control Paragard again  Can you please call me to discuss for birth control my phone number [phone]   Thank you

## 2021-08-03 NOTE — TELEPHONE ENCOUNTER
I called the patient back  She reports that she is having daily headaches and she has increasing edema and irritability that have persisted since insertion of her Mirena  She would like to return to [facility]  She understands that she may have heavy bleeding again after insertion of paragard  She is willing to take this risk again

## 2021-08-03 NOTE — TELEPHONE ENCOUNTER
Called pt  She is having severe migraines with ocular pain since insertion of mirena  She also has increasing edema and weight gain  She desires to have mirena removed and have paragard placed again  Aware of risk of hemorrhage as she had prior

## 2021-08-03 NOTE — PROGRESS NOTES
Iud removal    Date/Time: 8/3/2021 1:10 PM  Performed by: Gladis Calixto MD  Authorized by: Gladis Calixto MD   Universal Protocol:  Consent: Verbal consent obtained  Written consent obtained  Risks and benefits: risks, benefits and alternatives were discussed  Consent given by: patient  Time out: Immediately prior to procedure a "time out" was called to verify the correct patient, procedure, equipment, support staff and site/side marked as required  Patient understanding: patient states understanding of the procedure being performed  Patient consent: the patient's understanding of the procedure matches consent given  Procedure consent: procedure consent matches procedure scheduled  Relevant documents: relevant documents present and verified  Required items: required blood products, implants, devices, and special equipment available  Patient identity confirmed: verbally with patient      Procedure:     Removed with no complications: yes      Removal due to mechanical complications of IUD: no      Removal due to infection and inflammatory reaction: no      Other reason for removal:  Persistent headaches and weight gain  Iud insertions    Date/Time: 8/3/2021 1:10 PM  Performed by: Gladis Calixto MD  Authorized by: Gladis Calixto MD   Universal Protocol:  Consent: Verbal consent obtained  Written consent obtained  Risks and benefits: risks, benefits and alternatives were discussed  Consent given by: patient  Time out: Immediately prior to procedure a "time out" was called to verify the correct patient, procedure, equipment, support staff and site/side marked as required    Patient understanding: patient states understanding of the procedure being performed  Patient consent: the patient's understanding of the procedure matches consent given  Procedure consent: procedure consent matches procedure scheduled  Relevant documents: relevant documents present and verified  Required items: required blood products, implants, devices, and special equipment available  Patient identity confirmed: verbally with patient        Procedure:     Pelvic exam performed: yes      Cervix cleaned and prepped: yes      Speculum placed in vagina: yes      Tenaculum applied to cervix: yes Sherlynn Risk)      Uterus sounded: yes      Uterus sound depth (cm):  9    IUD inserted with no complications: yes      IUD type:  ParaGard    Strings trimmed: yes    Post-procedure:     Patient tolerated procedure well: yes      Patient will follow up after next period: yes

## 2021-08-10 ENCOUNTER — HOSPITAL ENCOUNTER (EMERGENCY)
Facility: HOSPITAL | Age: 46
Discharge: HOME | End: 2021-08-11
Attending: EMERGENCY MEDICINE | Admitting: EMERGENCY MEDICINE
Payer: COMMERCIAL

## 2021-08-10 ENCOUNTER — APPOINTMENT (EMERGENCY)
Dept: RADIOLOGY | Facility: HOSPITAL | Age: 46
End: 2021-08-10
Payer: COMMERCIAL

## 2021-08-10 VITALS
TEMPERATURE: 98.5 F | RESPIRATION RATE: 20 BRPM | SYSTOLIC BLOOD PRESSURE: 112 MMHG | DIASTOLIC BLOOD PRESSURE: 54 MMHG | HEART RATE: 74 BPM | OXYGEN SATURATION: 98 %

## 2021-08-10 DIAGNOSIS — R10.9 ABDOMINAL PAIN: ICD-10-CM

## 2021-08-10 DIAGNOSIS — R07.9 CHEST PAIN WITH LOW RISK OF ACUTE CORONARY SYNDROME: Primary | ICD-10-CM

## 2021-08-10 LAB
ALBUMIN SERPL BCP-MCNC: 4.1 G/DL (ref 3.5–5)
ALP SERPL-CCNC: 58 U/L (ref 46–116)
ALT SERPL W P-5'-P-CCNC: 23 U/L (ref 12–78)
ANION GAP SERPL CALCULATED.3IONS-SCNC: 7 MMOL/L (ref 4–13)
AST SERPL W P-5'-P-CCNC: 12 U/L (ref 5–45)
BASOPHILS # BLD AUTO: 0.1 THOUSANDS/ΜL (ref 0–0.1)
BASOPHILS NFR BLD AUTO: 1 % (ref 0–1)
BILIRUB DIRECT SERPL-MCNC: 0.08 MG/DL (ref 0–0.2)
BILIRUB SERPL-MCNC: 0.24 MG/DL (ref 0.2–1)
BUN SERPL-MCNC: 7 MG/DL (ref 5–25)
CALCIUM SERPL-MCNC: 8.8 MG/DL (ref 8.3–10.1)
CHLORIDE SERPL-SCNC: 105 MMOL/L (ref 100–108)
CO2 SERPL-SCNC: 30 MMOL/L (ref 21–32)
CREAT SERPL-MCNC: 0.73 MG/DL (ref 0.6–1.3)
EOSINOPHIL # BLD AUTO: 0.19 THOUSAND/ΜL (ref 0–0.61)
EOSINOPHIL NFR BLD AUTO: 2 % (ref 0–6)
ERYTHROCYTE [DISTWIDTH] IN BLOOD BY AUTOMATED COUNT: 11.7 % (ref 11.6–15.1)
GFR SERPL CREATININE-BSD FRML MDRD: 100 ML/MIN/1.73SQ M
GLUCOSE SERPL-MCNC: 93 MG/DL (ref 65–140)
HCT VFR BLD AUTO: 37.1 % (ref 34.8–46.1)
HGB BLD-MCNC: 12.6 G/DL (ref 11.5–15.4)
IMM GRANULOCYTES # BLD AUTO: 0.04 THOUSAND/UL (ref 0–0.2)
IMM GRANULOCYTES NFR BLD AUTO: 0 % (ref 0–2)
LIPASE SERPL-CCNC: 184 U/L (ref 73–393)
LYMPHOCYTES # BLD AUTO: 3.38 THOUSANDS/ΜL (ref 0.6–4.47)
LYMPHOCYTES NFR BLD AUTO: 32 % (ref 14–44)
MAGNESIUM SERPL-MCNC: 2.2 MG/DL (ref 1.6–2.6)
MCH RBC QN AUTO: 30.1 PG (ref 26.8–34.3)
MCHC RBC AUTO-ENTMCNC: 34 G/DL (ref 31.4–37.4)
MCV RBC AUTO: 89 FL (ref 82–98)
MONOCYTES # BLD AUTO: 0.62 THOUSAND/ΜL (ref 0.17–1.22)
MONOCYTES NFR BLD AUTO: 6 % (ref 4–12)
NEUTROPHILS # BLD AUTO: 6.14 THOUSANDS/ΜL (ref 1.85–7.62)
NEUTS SEG NFR BLD AUTO: 59 % (ref 43–75)
NRBC BLD AUTO-RTO: 0 /100 WBCS
PLATELET # BLD AUTO: 319 THOUSANDS/UL (ref 149–390)
PMV BLD AUTO: 11.2 FL (ref 8.9–12.7)
POTASSIUM SERPL-SCNC: 3.7 MMOL/L (ref 3.5–5.3)
PROT SERPL-MCNC: 7.5 G/DL (ref 6.4–8.2)
RBC # BLD AUTO: 4.18 MILLION/UL (ref 3.81–5.12)
SODIUM SERPL-SCNC: 142 MMOL/L (ref 136–145)
TROPONIN I SERPL-MCNC: <0.02 NG/ML
WBC # BLD AUTO: 10.47 THOUSAND/UL (ref 4.31–10.16)

## 2021-08-10 PROCEDURE — 93005 ELECTROCARDIOGRAM TRACING: CPT

## 2021-08-10 PROCEDURE — 71046 X-RAY EXAM CHEST 2 VIEWS: CPT

## 2021-08-10 PROCEDURE — 36415 COLL VENOUS BLD VENIPUNCTURE: CPT | Performed by: EMERGENCY MEDICINE

## 2021-08-10 PROCEDURE — 99285 EMERGENCY DEPT VISIT HI MDM: CPT | Performed by: EMERGENCY MEDICINE

## 2021-08-10 PROCEDURE — 85025 COMPLETE CBC W/AUTO DIFF WBC: CPT | Performed by: EMERGENCY MEDICINE

## 2021-08-10 PROCEDURE — 96360 HYDRATION IV INFUSION INIT: CPT

## 2021-08-10 PROCEDURE — 83735 ASSAY OF MAGNESIUM: CPT | Performed by: EMERGENCY MEDICINE

## 2021-08-10 PROCEDURE — 84484 ASSAY OF TROPONIN QUANT: CPT | Performed by: EMERGENCY MEDICINE

## 2021-08-10 PROCEDURE — 99285 EMERGENCY DEPT VISIT HI MDM: CPT

## 2021-08-10 PROCEDURE — 83690 ASSAY OF LIPASE: CPT | Performed by: EMERGENCY MEDICINE

## 2021-08-10 PROCEDURE — 80048 BASIC METABOLIC PNL TOTAL CA: CPT | Performed by: EMERGENCY MEDICINE

## 2021-08-10 PROCEDURE — 80076 HEPATIC FUNCTION PANEL: CPT | Performed by: EMERGENCY MEDICINE

## 2021-08-10 RX ORDER — ONDANSETRON 4 MG/1
4 TABLET, ORALLY DISINTEGRATING ORAL EVERY 6 HOURS PRN
Qty: 20 TABLET | Refills: 0 | Status: SHIPPED | OUTPATIENT
Start: 2021-08-10 | End: 2022-01-12

## 2021-08-10 RX ORDER — FAMOTIDINE 20 MG/1
20 TABLET, FILM COATED ORAL 2 TIMES DAILY
Qty: 30 TABLET | Refills: 0 | Status: SHIPPED | OUTPATIENT
Start: 2021-08-10 | End: 2022-01-12

## 2021-08-10 RX ADMIN — SODIUM CHLORIDE 1000 ML: 0.9 INJECTION, SOLUTION INTRAVENOUS at 22:36

## 2021-08-11 LAB
ATRIAL RATE: 84 BPM
P AXIS: 45 DEGREES
PR INTERVAL: 176 MS
QRS AXIS: -15 DEGREES
QRSD INTERVAL: 104 MS
QT INTERVAL: 342 MS
QTC INTERVAL: 404 MS
T WAVE AXIS: 68 DEGREES
VENTRICULAR RATE: 84 BPM

## 2021-08-11 PROCEDURE — 93010 ELECTROCARDIOGRAM REPORT: CPT | Performed by: INTERNAL MEDICINE

## 2021-08-11 NOTE — ED PROVIDER NOTES
History  Chief Complaint   Patient presents with    Chest Pain     Pt c/o chest pain with left shoulder pain for 2 days  Pt c/o nausea  41 YO female presents with chest and Left shoulder pain for the last two days  Pt states the chest pain has been intermittent, often occurring 3-4 times daily, lasting ~10 minutes before resolving spontaneously  She notes some nausea accompanies this discomfort and she does have burping with this  She states should pain has been more constant  She denies an exertional component to her discomfort, has no associated lightheadedness, shortness of breath, diaphoresis  She denies similar in the past  Pt currently denies the pain  Pt has no Hx of cardiac disease, no family Hx of cardiac disease at a young age, no Hx of DM, HTN, hypercholesterolemia  Pt denies SOB/F/C/V/D/C, no dysuria, burning on urination or blood in urine  History provided by:  Patient   used: No    Chest Pain  Pain location:  Substernal area  Pain quality: aching    Pain severity:  Moderate  Onset quality:  Gradual  Duration:  2 days  Timing:  Intermittent  Progression:  Waxing and waning  Chronicity:  New  Relieved by:  Nothing  Worsened by:  Nothing tried  Associated symptoms: nausea    Associated symptoms: no abdominal pain, no diaphoresis, no dizziness, no fatigue, no fever, no shortness of breath, not vomiting and no weakness        Prior to Admission Medications   Prescriptions Last Dose Informant Patient Reported? Taking?    Vitamin D, Cholecalciferol, 1000 units CAPS  Self Yes No   Sig: Take 1,000 Units by mouth   atenolol (TENORMIN) 25 mg tablet   No No   Sig: Take 1 tablet (25 mg total) by mouth daily   multivitamin (THERAGRAN) TABS  Self Yes No   Sig: Take 1 tablet by mouth   omeprazole (PriLOSEC) 40 MG capsule   No No   Sig: Take 1 capsule (40 mg total) by mouth 2 (two) times a day for 14 days      Facility-Administered Medications: None       Past Medical History:   Diagnosis Date    Diverticulitis     Heart palpitations 2020    Varicella vaccination        Past Surgical History:   Procedure Laterality Date     SECTION      x4    COLONOSCOPY      2 benign polyps repeat age 50   [facility] ([facility])  2020    Mirena    MAMMO STEREOTACTIC BREAST BIOPSY LEFT (ALL INC) Left 2021    MOUTH SURGERY      implant of tooth x 2    IN COLONOSCOPY FLX DX W/COLLJ SPEC WHEN PFRMD N/A 5/15/2017    Procedure: COLONOSCOPY;  Surgeon: Kan Estes MD;  Location: [facility]; Service: Gastroenterology    REMOVAL OF INTRAUTERINE DEVICE (IUD)  2020    Paraguard    WISDOM TOOTH EXTRACTION         Family History   Problem Relation Age of Onset    Brain cancer Maternal Grandmother     Breast cancer Maternal Aunt 79    Uterine cancer Mother     Diabetes Mother     Hypertension Mother     No Known Problems Father     Fibroids Sister         hysterectomy    Other Sister         bilateral hip replacement    Hypertension Brother     Liver disease Sister         cyst on liver    No Known Problems Sister     No Known Problems Sister     No Known Problems Sister     No Known Problems Brother     Lung disease Brother     Hypertension Brother     No Known Problems Brother     Other Brother         palpitations    No Known Problems Half-Brother     Lung cancer Maternal Uncle     No Known Problems Daughter     No Known Problems Maternal Grandfather     Hypertension Paternal Grandmother     Diabetes Paternal Grandmother     No Known Problems Paternal Grandfather     No Known Problems Daughter     No Known Problems Daughter     Fibroids Maternal Aunt 40        hysterectomy    Ovarian cancer Neg Hx     Colon cancer Neg Hx      I have reviewed and agree with the history as documented      E-Cigarette/Vaping    E-Cigarette Use Never User      E-Cigarette/Vaping Substances    Nicotine No     THC No     CBD No     Flavoring No     Other No     Unknown No      Social History     Tobacco Use    Smoking status: Former Smoker    Smokeless tobacco: Never Used    Tobacco comment: Hooka   Vaping Use    Vaping Use: Never used   Substance Use Topics    Alcohol use: No    Drug use: No       Review of Systems   Constitutional: Negative for chills, diaphoresis, fatigue and fever  HENT: Negative for dental problem  Eyes: Negative for visual disturbance  Respiratory: Negative for shortness of breath  Cardiovascular: Positive for chest pain  Gastrointestinal: Positive for nausea  Negative for abdominal pain, diarrhea and vomiting  Genitourinary: Negative for dysuria and frequency  Musculoskeletal: Negative for arthralgias  Skin: Negative for rash  Neurological: Negative for dizziness, weakness and light-headedness  Psychiatric/Behavioral: Negative for agitation, behavioral problems and confusion  All other systems reviewed and are negative  Physical Exam  Physical Exam  Vitals and nursing note reviewed  Constitutional:       Appearance: Normal appearance  HENT:      Head: Normocephalic and atraumatic  Eyes:      Extraocular Movements: Extraocular movements intact  Conjunctiva/sclera: Conjunctivae normal    Cardiovascular:      Rate and Rhythm: Normal rate  Pulmonary:      Effort: Pulmonary effort is normal    Abdominal:      General: There is no distension  Musculoskeletal:         General: Normal range of motion  Cervical back: Normal range of motion  Skin:     Findings: No rash  Neurological:      General: No focal deficit present  Mental Status: She is alert  Cranial Nerves: No cranial nerve deficit     Psychiatric:         Mood and Affect: Mood normal          Vital Signs  ED Triage Vitals   Temperature Pulse Respirations Blood Pressure SpO2   08/10/21 2153 08/10/21 2151 08/10/21 2151 08/10/21 2151 08/10/21 2151   98 5 °F (36 9 °C) 91 20 123/57 100 %      Temp Source Heart Rate Source Patient Position - Orthostatic VS BP Location FiO2 (%)   08/10/21 2153 08/10/21 2151 08/10/21 2151 08/10/21 2151 --   Oral Monitor Lying Right arm       Pain Score       --                  Vitals:    08/10/21 2151 08/10/21 2300 08/10/21 2335   BP: 123/57 116/56 112/54   Pulse: 91 72 74   Patient Position - Orthostatic VS: Lying Lying Lying         Visual Acuity      ED Medications  Medications   sodium chloride 0 9 % bolus 1,000 mL (0 mL Intravenous Stopped 8/10/21 2334)       Diagnostic Studies  Results Reviewed     Procedure Component Value Units Date/Time    Troponin I [[id number]]  (Normal) Collected: 08/10/21 2257    Lab Status: Final result Specimen: Blood from Arm, Left Updated: 08/10/21 2322     Troponin I <0 02 ng/mL     Basic metabolic panel [[id number]] Collected: 08/10/21 2224    Lab Status: Final result Specimen: Blood from Arm, Left Updated: 08/10/21 2253     Sodium 142 mmol/L      Potassium 3 7 mmol/L      Chloride 105 mmol/L      CO2 30 mmol/L      ANION GAP 7 mmol/L      BUN 7 mg/dL      Creatinine 0 73 mg/dL      Glucose 93 mg/dL      Calcium 8 8 mg/dL      eGFR 100 ml/min/1 73sq m     Narrative:      [place] guidelines for Chronic Kidney Disease (CKD):     Stage 1 with normal or high GFR (GFR > 90 mL/min/1 73 square meters)    Stage 2 Mild CKD (GFR = 60-89 mL/min/1 73 square meters)    Stage 3A Moderate CKD (GFR = 45-59 mL/min/1 73 square meters)    Stage 3B Moderate CKD (GFR = 30-44 mL/min/1 73 square meters)    Stage 4 Severe CKD (GFR = 15-29 mL/min/1 73 square meters)    Stage 5 End Stage CKD (GFR <15 mL/min/1 73 square meters)  Note: GFR calculation is accurate only with a steady state creatinine    Hepatic function panel [[id number]]  (Normal) Collected: 08/10/21 2224    Lab Status: Final result Specimen: Blood from Arm, Left Updated: 08/10/21 2253     Total Bilirubin 0 24 mg/dL      Bilirubin, Direct 0 08 mg/dL      Alkaline Phosphatase 58 U/L      AST 12 U/L ALT 23 U/L      Total Protein 7 5 g/dL      Albumin 4 1 g/dL     Magnesium [[id number]]  (Normal) Collected: 08/10/21 2224    Lab Status: Final result Specimen: Blood from Arm, Left Updated: 08/10/21 2253     Magnesium 2 2 mg/dL     Lipase [[id number]]  (Normal) Collected: 08/10/21 2224    Lab Status: Final result Specimen: Blood from Arm, Left Updated: 08/10/21 2253     Lipase 184 u/L     CBC and differential [[id number]]  (Abnormal) Collected: 08/10/21 2224    Lab Status: Final result Specimen: Blood from Arm, Left Updated: 08/10/21 2233     WBC 10 47 Thousand/uL      RBC 4 18 Million/uL      Hemoglobin 12 6 g/dL      Hematocrit 37 1 %      MCV 89 fL      MCH 30 1 pg      MCHC 34 0 g/dL      RDW 11 7 %      MPV 11 2 fL      Platelets 255 Thousands/uL      nRBC 0 /100 WBCs      Neutrophils Relative 59 %      Immat GRANS % 0 %      Lymphocytes Relative 32 %      Monocytes Relative 6 %      Eosinophils Relative 2 %      Basophils Relative 1 %      Neutrophils Absolute 6 14 Thousands/µL      Immature Grans Absolute 0 04 Thousand/uL      Lymphocytes Absolute 3 38 Thousands/µL      Monocytes Absolute 0 62 Thousand/µL      Eosinophils Absolute 0 19 Thousand/µL      Basophils Absolute 0 10 Thousands/µL                  XR chest 2 views   Final Result by Rob Crepso MD (08/11 2820)      No acute cardiopulmonary disease                    Workstation performed: [id number]                    Procedures  ECG 12 Lead Documentation Only    Date/Time: 8/11/2021 4:47 PM  Performed by: Farzaneh Epstein MD  Authorized by: Farzaneh Epstein MD     ECG reviewed by me, the ED Provider: yes    Patient location:  ED  Interpretation:     Interpretation: non-specific    Rate:     ECG rate:  84    ECG rate assessment: normal    Rhythm:     Rhythm: sinus rhythm    QRS:     QRS axis:  Normal    QRS intervals:  Normal  Conduction:     Conduction: normal    ST segments:     ST segments:  Normal  T waves:     T waves: normal               ED Course             HEART Risk Score      Most Recent Value   Heart Score Risk Calculator   History  0 Filed at: 08/11/2021 1647   ECG  1 Filed at: 08/11/2021 1647   Age  0 Filed at: 08/11/2021 1647   Risk Factors  0 Filed at: 08/11/2021 1647   Troponin  0 Filed at: 08/11/2021 1647   HEART Score  1 Filed at: 08/11/2021 1647                      SBIRT 22yo+      Most Recent Value   SBIRT (25 yo +)   In order to provide better care to our patients, we are screening all of our patients for alcohol and drug use  Would it be okay to ask you these screening questions? Unable to answer at this time Filed at: 08/10/2021 2330                    MDM  Number of Diagnoses or Management Options  Abdominal pain: new and requires workup  Chest pain with low risk of acute coronary syndrome: new and requires workup  Diagnosis management comments: 2  Chest pain - Pt with non-exertional chest pain, low risk for cardiac issues due to know risk factors and no family Hx of cardiac issues  Will check ECG and troponin, CBC for anemia, electrolytes,  LFT's to assess GB dysfunction, lipase for pancreatitis  Will check CXR         Amount and/or Complexity of Data Reviewed  Clinical lab tests: ordered and reviewed  Tests in the radiology section of CPT®: ordered and reviewed  Independent visualization of images, tracings, or specimens: yes    Patient Progress  Patient progress: improved      Disposition  Final diagnoses:   Chest pain with low risk of acute coronary syndrome   Abdominal pain     Time reflects when diagnosis was documented in both MDM as applicable and the Disposition within this note     Time User Action Codes Description Comment    8/10/2021 11:33 PM Scout Garrison [R07 9] Chest pain with low risk of acute coronary syndrome     8/10/2021 11:33 PM Scout Garrison [R10 9] Abdominal pain       ED Disposition     ED Disposition Condition Date/Time Comment    Discharge Stable Tue Aug 10, 2021 11:32 PM [address] [facility] discharge to home/self care  Follow-up Information     Follow up With Specialties Details Why Contact Shaniamarquita OchoaJhony Alston   [phone]      Kin Miranda MD Cardiology, Internal Medicine   [address] [address]  [phone]            Discharge Medication List as of 8/10/2021 11:36 PM      START taking these medications    Details   famotidine (PEPCID) 20 mg tablet Take 1 tablet (20 mg total) by mouth 2 (two) times a day, Starting Tue 8/10/2021, Normal      ondansetron (ZOFRAN-ODT) 4 mg disintegrating tablet Take 1 tablet (4 mg total) by mouth every 6 (six) hours as needed for nausea, Starting Tue 8/10/2021, Normal         CONTINUE these medications which have NOT CHANGED    Details   atenolol (TENORMIN) 25 mg tablet Take 1 tablet (25 mg total) by mouth daily, Starting Fri 4/16/2021, Normal      multivitamin (THERAGRAN) TABS Take 1 tablet by mouth, Historical Med      omeprazole (PriLOSEC) 40 MG capsule Take 1 capsule (40 mg total) by mouth 2 (two) times a day for 14 days, Starting Fri 10/16/2020, Until Fri 4/16/2021, Normal      Vitamin D, Cholecalciferol, 1000 units CAPS Take 1,000 Units by mouth, Historical Med           No discharge procedures on file      PDMP Review     None          ED Provider  Electronically Signed by           Farzaneh Epstein MD  08/11/21 9251

## 2021-08-11 NOTE — DISCHARGE INSTRUCTIONS
Return to the ER if your chest pain changes in quality or location, or becomes associated with shortness of breath, lightheadedness, vomiting or sweating  Speak with your cardiologist and your family doctor, let them know you were in the ER with this discomfort, you should be seen in the office for further evaluation and management  Take the Pepcid twice daily, this may help with the discomfort  Take the zofran as needed for nausea, this can be placed under the tongue to dissolve if you are vomiting

## 2021-08-17 ENCOUNTER — LAB (OUTPATIENT)
Dept: LAB | Facility: HOSPITAL | Age: 46
End: 2021-08-17
Payer: COMMERCIAL

## 2021-08-17 DIAGNOSIS — Z00.00 ROUTINE ADULT HEALTH MAINTENANCE: ICD-10-CM

## 2021-08-17 DIAGNOSIS — M25.50 ARTHRALGIA, UNSPECIFIED JOINT: ICD-10-CM

## 2021-08-17 LAB
CHOLEST SERPL-MCNC: 180 MG/DL
CRP SERPL QL: <5 MG/L
ERYTHROCYTE [SEDIMENTATION RATE] IN BLOOD: 10 MM/HOUR (ref 0–19)
HDLC SERPL-MCNC: 38 MG/DL
LDLC SERPL CALC-MCNC: 115 MG/DL
NONHDLC SERPL-MCNC: 142 MG/DL
TRIGL SERPL-MCNC: 135 MG/DL
URATE SERPL-MCNC: 5.2 MG/DL (ref 2.7–7.5)

## 2021-08-17 PROCEDURE — 86038 ANTINUCLEAR ANTIBODIES: CPT

## 2021-08-17 PROCEDURE — 86140 C-REACTIVE PROTEIN: CPT

## 2021-08-17 PROCEDURE — 80061 LIPID PANEL: CPT

## 2021-08-17 PROCEDURE — 84550 ASSAY OF BLOOD/URIC ACID: CPT

## 2021-08-17 PROCEDURE — 86430 RHEUMATOID FACTOR TEST QUAL: CPT

## 2021-08-17 PROCEDURE — 85652 RBC SED RATE AUTOMATED: CPT

## 2021-08-17 PROCEDURE — 86618 LYME DISEASE ANTIBODY: CPT

## 2021-08-17 PROCEDURE — 36415 COLL VENOUS BLD VENIPUNCTURE: CPT

## 2021-08-18 LAB
B BURGDOR IGG+IGM SER-ACNC: 59
RHEUMATOID FACT SER QL LA: NEGATIVE
RYE IGE QN: NEGATIVE

## 2021-08-27 ENCOUNTER — OFFICE VISIT (OUTPATIENT)
Dept: DENTISTRY | Facility: CLINIC | Age: 46
End: 2021-08-27

## 2021-08-27 VITALS — HEART RATE: 65 BPM | DIASTOLIC BLOOD PRESSURE: 48 MMHG | SYSTOLIC BLOOD PRESSURE: 110 MMHG | TEMPERATURE: 98.6 F

## 2021-08-27 DIAGNOSIS — K08.9 CHRONIC DENTAL PAIN: Primary | ICD-10-CM

## 2021-08-27 DIAGNOSIS — G89.29 CHRONIC DENTAL PAIN: Primary | ICD-10-CM

## 2021-08-27 DIAGNOSIS — Z01.21 ENCOUNTER FOR DENTAL EXAMINATION AND CLEANING WITH ABNORMAL FINDINGS: ICD-10-CM

## 2021-08-27 PROCEDURE — D0140 LIMITED ORAL EVALUATION - PROBLEM FOCUSED: HCPCS | Performed by: DENTIST

## 2021-08-27 PROCEDURE — D0230 INTRAORAL - PERIAPICAL EACH ADDITIONAL RADIOGRAPHIC IMAGE: HCPCS

## 2021-08-27 PROCEDURE — D0220 INTRAORAL - PERIAPICAL FIRST RADIOGRAPHIC IMAGE: HCPCS

## 2021-08-27 NOTE — PROGRESS NOTES
Patient presents for limited exam    CC: "I lost my bridge, it fell out and I have pain on the lower left " Pt is pointing to #4-6 and #19  HHX reviewed    Radiographs taken: PA taken of #4-6 and  #19  Dr Areli Baumann did exam- bridge from #4-6 has fallen otu and cannot be repaired  Dr Starr Matos gave the patient 2 options:  1: If she wants to save #6, it will need a retreat rct, post/core and crown  And #4 ext  2: She can remove #4, 6 and replace both with implants and construct an implant bridge from #4-6  Pt opted for option 1 and would like to start rct tx at her next appt scheduled on 9/13/21  Regarding #19- it was explained to the patient there is no tooth under the crown as this is part of a bridge, so when she is ready, we can section off #19 and place an implant in the site  At this time, we believe the crown to be impinging on the gum tissue  NV: Scheduled 9/13/21- start rct #6 and if time allows remove root tip #4  Scheduled with Dr Geoff Ford

## 2021-09-13 ENCOUNTER — OFFICE VISIT (OUTPATIENT)
Dept: DENTISTRY | Facility: CLINIC | Age: 46
End: 2021-09-13

## 2021-09-13 VITALS — HEART RATE: 72 BPM | SYSTOLIC BLOOD PRESSURE: 107 MMHG | DIASTOLIC BLOOD PRESSURE: 41 MMHG | TEMPERATURE: 98 F

## 2021-09-13 DIAGNOSIS — Z01.21 ENCOUNTER FOR DENTAL EXAMINATION AND CLEANING WITH ABNORMAL FINDINGS: Primary | ICD-10-CM

## 2021-09-13 PROCEDURE — D0191 ASSESSMENT OF A PATIENT: HCPCS | Performed by: DENTIST

## 2021-09-13 NOTE — PROGRESS NOTES
41 yo F presents for assessment and possible exo #4 root tip and #6 (fractured, RCT-treated)  Pt is not in any acute pain today  I/O exam reveals poor prognosis of #6 due to minimal remaining tooth structure and periodontal status  Told pt that if tooth was RCT treated again, with new post and core, and crown, the longevity may not be long  Pt advised that a better treatment with likely greater longevity would include extraction #6 as well as extraction #4 (root tip, RCT treated)  These sites may potentially be replaced w/ implants if pt desires  Pt also examined by Dr Funmilayo Dalton  Pt advised that new radiographs, diagnostic impressions would be helpful in designing intraoral prosthesis  Pt to return for recall, x-rays, diagnostic impressions after extraction of #4 and #6  PMH reviewed  Pt had 2 ED visits in August due to palpitations  EKG revealed  Stress test was ordered but then denied by insurance  Stress test has been rescheduled  Pt takes atenolol  BP taken 3 times - 107/41, 110/55, 115, 52  Pulse ox 97  Pt needs a stress test which is scheduled for this upcoming Friday  Pt told that she needs to attend the test so that we can ensure that she is able to handle procedure of exo root tip #4 (may be surgical) and #6 (RCT-treated)  Pt understood  No procedure done today      NV: exo #4 and #6  NNV: recall exam - new radiographs and preliminary impressions for treatment plan creation to address replacement of edentulous areas

## 2021-09-15 ENCOUNTER — OFFICE VISIT (OUTPATIENT)
Dept: OBGYN CLINIC | Facility: CLINIC | Age: 46
End: 2021-09-15
Payer: COMMERCIAL

## 2021-09-15 VITALS
BODY MASS INDEX: 26.36 KG/M2 | OXYGEN SATURATION: 96 % | DIASTOLIC BLOOD PRESSURE: 62 MMHG | SYSTOLIC BLOOD PRESSURE: 98 MMHG | HEART RATE: 81 BPM | HEIGHT: 66 IN | WEIGHT: 164 LBS

## 2021-09-15 DIAGNOSIS — Z30.431 CONTRACEPTIVE, SURVEILLANCE, INTRAUTERINE DEVICE: Primary | ICD-10-CM

## 2021-09-15 PROCEDURE — 99213 OFFICE O/P EST LOW 20 MIN: CPT | Performed by: OBSTETRICS & GYNECOLOGY

## 2021-09-15 RX ORDER — COPPER 313.4 MG/1
1 INTRAUTERINE DEVICE INTRAUTERINE ONCE
COMMUNITY

## 2021-09-15 NOTE — PROGRESS NOTES
Patient is a 42 y o  [id number] with Patient's last menstrual period was 2021 (exact date)  who presents for follow up s/p removal of mirena iud and reinsertion of paragard IUD on 8/3/2021  Pt reports overall she feels well  She denies any abnormal vaginal discharge, fevers, chills, pelvic pain or abnormal bleeding  She reports 1 menses since insertion which was slightly heavier than she is used to but much better than with her previous copper IUD  She reports no pain or discomfort with intercourse  She is happy with her IUD change as all of her headaches resolved after removing her mirena  Past Medical History:   Diagnosis Date    Diverticulitis     Heart palpitations 2020    Varicella vaccination        Past Surgical History:   Procedure Laterality Date     SECTION      x4    COLONOSCOPY      2 benign polyps repeat age 50   [facility] (IUD)  2020    Mirena    MAMMO STEREOTACTIC BREAST BIOPSY LEFT (ALL INC) Left 2021    MOUTH SURGERY      implant of tooth x 2    ME COLONOSCOPY FLX DX W/COLLJ SPEC WHEN PFRMD N/A 5/15/2017    Procedure: COLONOSCOPY;  Surgeon: Per Wyatt MD;  Location: [facility] GI LAB;   Service: Gastroenterology    REMOVAL OF INTRAUTERINE DEVICE (IUD)  2020    Paraguard    WISDOM TOOTH EXTRACTION         OB History    Para Term  AB Living   4 4 4     4   SAB TAB Ectopic Multiple Live Births                  # Outcome Date GA Lbr Isaias/2nd Weight Sex Delivery Anes PTL Lv   4 Term            3 Term            2 Term            1 Term               Obstetric Comments   FT C/S x 4      Menarche: 14      28/8-10/regular pad every 8-10 hours--mostly spotting due to Mirena             Current Outpatient Medications:     atenolol (TENORMIN) 25 mg tablet, Take 1 tablet (25 mg total) by mouth daily, Disp: 90 tablet, Rfl: 5    famotidine (PEPCID) 20 mg tablet, Take 1 tablet (20 mg total) by mouth 2 (two) times a day, Disp: 30 tablet, Rfl: 0    intrauterine copper (PARAGARD) IUD, 1 each by Intrauterine route once, Disp: , Rfl:     multivitamin (THERAGRAN) TABS, Take 1 tablet by mouth, Disp: , Rfl:     Vitamin D, Cholecalciferol, 1000 units CAPS, Take 1,000 Units by mouth, Disp: , Rfl:     omeprazole (PriLOSEC) 40 MG capsule, Take 1 capsule (40 mg total) by mouth 2 (two) times a day for 14 days (Patient not taking: Reported on 9/15/2021), Disp: 28 capsule, Rfl: 0    ondansetron (ZOFRAN-ODT) 4 mg disintegrating tablet, Take 1 tablet (4 mg total) by mouth every 6 (six) hours as needed for nausea (Patient not taking: Reported on 9/15/2021), Disp: 20 tablet, Rfl: 0    No Known Allergies    Social History     Socioeconomic History    Marital status: /Civil Union     Spouse name: Sameer Cardoso Number of children: 4    Years of education:     Highest education level: Not on file   Occupational History    Occupation: unemployed   Tobacco Use    Smoking status: Former Smoker    Smokeless tobacco: Never Used    Tobacco comment: Hooka   Vaping Use    Vaping Use: Never used   Substance and Sexual Activity    Alcohol use: No    Drug use: No    Sexual activity: Yes     Partners: Male     Birth control/protection: I U D  Comment: Lifetime patners; 1   Other Topics Concern    Not on file   Social History Narrative    Church: [employer]    Accepts blood products        Exercise: 5x/week x 30 min    Calcium: Multivitamin daily, 1 c almond milk irregaular, 1 yogurt 3-4x/week      Social Determinants of Health     Financial Resource Strain:     Difficulty of Paying Living Expenses:    Food Insecurity:     Worried About Running Out of Food in the Last Year:     Ran Out of Food in the Last Year:    Transportation Needs:     Lack of Transportation (Medical):      Lack of Transportation (Non-Medical):    Physical Activity:     Days of Exercise per Week:     Minutes of Exercise per Session:    Stress:     Feeling of Stress : Social Connections:     Frequency of Communication with Friends and Family:     Frequency of Social Gatherings with Friends and Family:     Attends Nondenominational Services:     Active Member of Clubs or Organizations:     Attends Club or Organization Meetings:     Marital Status:    Intimate Partner Violence:     Fear of Current or Ex-Partner:     Emotionally Abused:     Physically Abused:     Sexually Abused:        Family History   Problem Relation Age of Onset    Brain cancer Maternal Grandmother     Breast cancer Maternal Aunt 79    Uterine cancer Mother     Diabetes Mother     Hypertension Mother     No Known Problems Father     Fibroids Sister         hysterectomy    Other Sister         bilateral hip replacement    Hypertension Brother     Liver disease Sister         cyst on liver    No Known Problems Sister     No Known Problems Sister     No Known Problems Sister     No Known Problems Brother     Lung disease Brother     Hypertension Brother     No Known Problems Brother     Other Brother         palpitations    No Known Problems Half-Brother     Lung cancer Maternal Uncle     No Known Problems Daughter     No Known Problems Maternal Grandfather     Hypertension Paternal Grandmother     Diabetes Paternal Grandmother     No Known Problems Paternal Grandfather     No Known Problems Daughter     No Known Problems Daughter     Fibroids Maternal Aunt 36        hysterectomy    Ovarian cancer Neg Hx     Colon cancer Neg Hx        Review of Systems   Constitutional: Negative for chills, fatigue, fever and unexpected weight change  HENT: Negative for congestion, mouth sores and sore throat  Respiratory: Negative for cough, chest tightness, shortness of breath and wheezing  Cardiovascular: Negative for chest pain and palpitations  Gastrointestinal: Negative for abdominal distention, abdominal pain, constipation, diarrhea, nausea and vomiting     Endocrine: Negative for cold intolerance and heat intolerance  Genitourinary: Negative for dyspareunia, dysuria, genital sores, menstrual problem, pelvic pain, vaginal bleeding, vaginal discharge and vaginal pain  Musculoskeletal: Negative for arthralgias  Skin: Negative for color change and rash  Neurological: Negative for dizziness, light-headedness and headaches  Hematological: Negative for adenopathy  Blood pressure 98/62, pulse 81, height 5' 6" (1 676 m), weight 74 4 kg (164 lb), last menstrual period 08/21/2021, SpO2 96 %, not currently breastfeeding  and Body mass index is 26 47 kg/m²  Physical Exam  Constitutional:       General: She is not in acute distress  Appearance: Normal appearance  She is not ill-appearing  HENT:      Head: Normocephalic and atraumatic  Eyes:      Extraocular Movements: Extraocular movements intact  Conjunctiva/sclera: Conjunctivae normal    Pulmonary:      Effort: Pulmonary effort is normal    Abdominal:      General: There is no distension  Palpations: Abdomen is soft  There is no mass  Tenderness: There is no abdominal tenderness  There is no guarding or rebound  Hernia: No hernia is present  Musculoskeletal:         General: Normal range of motion  Cervical back: Normal range of motion  Skin:     General: Skin is warm  Neurological:      Mental Status: She is alert and oriented to person, place, and time  Psychiatric:         Mood and Affect: Mood normal          Behavior: Behavior normal          Thought Content:  Thought content normal          Judgment: Judgment normal           vulva: normal external genitalia for age and no lesions, masses, epithelial changes, or exudate  vagina: color pink, rugae  well formed rugae and discharge  yellow  cervix: parous, no lesions  and  IUD string  3 cm  uterus: NSSC, AF, NT, mobile  adnexa: no masses or tenderness      A/P:  Pt is a 42 y o  [id number] with      Diagnoses and all orders for this visit:    Contraceptive, surveillance, intrauterine device    doing well after removal with reinsertion  F/u for annual exam in January

## 2021-09-17 ENCOUNTER — HOSPITAL ENCOUNTER (OUTPATIENT)
Dept: NON INVASIVE DIAGNOSTICS | Facility: HOSPITAL | Age: 46
Discharge: HOME | End: 2021-09-17
Payer: COMMERCIAL

## 2021-09-17 DIAGNOSIS — R07.9 CHEST PAIN, UNSPECIFIED TYPE: ICD-10-CM

## 2021-09-17 PROCEDURE — 93017 CV STRESS TEST TRACING ONLY: CPT

## 2021-09-17 PROCEDURE — 93018 CV STRESS TEST I&R ONLY: CPT | Performed by: INTERNAL MEDICINE

## 2021-09-17 PROCEDURE — 93016 CV STRESS TEST SUPVJ ONLY: CPT | Performed by: INTERNAL MEDICINE

## 2021-09-20 LAB
CHEST PAIN STATEMENT: NORMAL
MAX DIASTOLIC BP: 60 MMHG
MAX HEART RATE: 155 BPM
MAX PREDICTED HEART RATE: 176 BPM
MAX. SYSTOLIC BP: 144 MMHG
PROTOCOL NAME: NORMAL
REASON FOR TERMINATION: NORMAL
TARGET HR FORMULA: NORMAL
TEST INDICATION: NORMAL
TIME IN EXERCISE PHASE: NORMAL

## 2021-12-07 ENCOUNTER — OFFICE VISIT (OUTPATIENT)
Dept: DENTISTRY | Facility: CLINIC | Age: 46
End: 2021-12-07

## 2021-12-07 VITALS — TEMPERATURE: 98 F | SYSTOLIC BLOOD PRESSURE: 112 MMHG | HEART RATE: 88 BPM | DIASTOLIC BLOOD PRESSURE: 65 MMHG

## 2021-12-07 DIAGNOSIS — Z01.21 ENCOUNTER FOR DENTAL EXAMINATION AND CLEANING WITH ABNORMAL FINDINGS: Primary | ICD-10-CM

## 2021-12-07 PROCEDURE — D6104: HCPCS | Performed by: DENTIST

## 2021-12-07 PROCEDURE — D7140 EXTRACTION, ERUPTED TOOTH OR EXPOSED ROOT (ELEVATION AND/OR FORCEPS REMOVAL): HCPCS | Performed by: DENTIST

## 2021-12-17 ENCOUNTER — OFFICE VISIT (OUTPATIENT)
Dept: DENTISTRY | Facility: CLINIC | Age: 46
End: 2021-12-17

## 2021-12-17 VITALS — TEMPERATURE: 98.6 F | HEART RATE: 90 BPM | SYSTOLIC BLOOD PRESSURE: 116 MMHG | DIASTOLIC BLOOD PRESSURE: 60 MMHG

## 2021-12-17 DIAGNOSIS — K08.109 TEETH MISSING: Primary | ICD-10-CM

## 2021-12-17 PROCEDURE — D0330 PANORAMIC RADIOGRAPHIC IMAGE: HCPCS | Performed by: DENTIST

## 2022-01-01 PROCEDURE — 99285 EMERGENCY DEPT VISIT HI MDM: CPT

## 2022-01-01 PROCEDURE — 93005 ELECTROCARDIOGRAM TRACING: CPT

## 2022-01-02 ENCOUNTER — HOSPITAL ENCOUNTER (EMERGENCY)
Facility: HOSPITAL | Age: 47
Discharge: HOME | End: 2022-01-02
Attending: EMERGENCY MEDICINE
Payer: MEDICARE

## 2022-01-02 ENCOUNTER — APPOINTMENT (EMERGENCY)
Dept: RADIOLOGY | Facility: HOSPITAL | Age: 47
End: 2022-01-02
Payer: MEDICARE

## 2022-01-02 VITALS
HEART RATE: 70 BPM | DIASTOLIC BLOOD PRESSURE: 58 MMHG | TEMPERATURE: 98.7 F | BODY MASS INDEX: 25.76 KG/M2 | RESPIRATION RATE: 16 BRPM | OXYGEN SATURATION: 99 % | WEIGHT: 159.61 LBS | SYSTOLIC BLOOD PRESSURE: 112 MMHG

## 2022-01-02 DIAGNOSIS — U07.1 COVID-19: ICD-10-CM

## 2022-01-02 DIAGNOSIS — R53.1 GENERALIZED WEAKNESS: Primary | ICD-10-CM

## 2022-01-02 DIAGNOSIS — R52 GENERALIZED BODY ACHES: ICD-10-CM

## 2022-01-02 DIAGNOSIS — R53.83 FATIGUE: ICD-10-CM

## 2022-01-02 LAB
ATRIAL RATE: 79 BPM
P AXIS: 76 DEGREES
PR INTERVAL: 180 MS
QRS AXIS: -45 DEGREES
QRSD INTERVAL: 106 MS
QT INTERVAL: 372 MS
QTC INTERVAL: 426 MS
T WAVE AXIS: 67 DEGREES
VENTRICULAR RATE: 79 BPM

## 2022-01-02 PROCEDURE — 96372 THER/PROPH/DIAG INJ SC/IM: CPT

## 2022-01-02 PROCEDURE — 93010 ELECTROCARDIOGRAM REPORT: CPT | Performed by: INTERNAL MEDICINE

## 2022-01-02 PROCEDURE — 71045 X-RAY EXAM CHEST 1 VIEW: CPT

## 2022-01-02 PROCEDURE — 99284 EMERGENCY DEPT VISIT MOD MDM: CPT | Performed by: EMERGENCY MEDICINE

## 2022-01-02 RX ORDER — KETOROLAC TROMETHAMINE 30 MG/ML
30 INJECTION, SOLUTION INTRAMUSCULAR; INTRAVENOUS ONCE
Status: COMPLETED | OUTPATIENT
Start: 2022-01-02 | End: 2022-01-02

## 2022-01-02 RX ORDER — MAGNESIUM HYDROXIDE/ALUMINUM HYDROXICE/SIMETHICONE 120; 1200; 1200 MG/30ML; MG/30ML; MG/30ML
30 SUSPENSION ORAL ONCE
Status: COMPLETED | OUTPATIENT
Start: 2022-01-02 | End: 2022-01-02

## 2022-01-02 RX ORDER — LIDOCAINE HYDROCHLORIDE 20 MG/ML
15 SOLUTION OROPHARYNGEAL ONCE
Status: COMPLETED | OUTPATIENT
Start: 2022-01-02 | End: 2022-01-02

## 2022-01-02 RX ADMIN — LIDOCAINE HYDROCHLORIDE 15 ML: 20 SOLUTION ORAL; TOPICAL at 00:50

## 2022-01-02 RX ADMIN — KETOROLAC TROMETHAMINE 30 MG: 30 INJECTION, SOLUTION INTRAMUSCULAR; INTRAVENOUS at 00:48

## 2022-01-02 RX ADMIN — ALUMINUM HYDROXIDE, MAGNESIUM HYDROXIDE, AND SIMETHICONE 30 ML: 200; 200; 20 SUSPENSION ORAL at 00:50

## 2022-01-02 NOTE — ED PROVIDER NOTES
History  Chief Complaint   Patient presents with    Weakness - Generalized     Pt reports tested positive for covid on , pt reports feels generalized weakness, reports worse with excertion  41 yo female who was diagnosed with COVID  and the past few days her body aches have been "in my ribs and my knees" and c/o being even more fatigued past few days  Pt also c/o feeling like food gets stuck in chest, but can tolerate both liquids and solids  History provided by:  Patient   used: No    Fatigue  Severity:  Moderate  Onset quality:  Gradual  Duration:  2 days  Timing:  Constant  Progression:  Worsening  Chronicity:  New  Context: recent infection (COVID)    Relieved by:  Nothing  Worsened by:  Nothing  Ineffective treatments:  None tried  Associated symptoms: myalgias    Associated symptoms: no abdominal pain, no arthralgias, no chest pain, no cough, no dysuria, no fever, no seizures, no shortness of breath and no vomiting    Associated symptoms comment:  Fatigue      Prior to Admission Medications   Prescriptions Last Dose Informant Patient Reported? Taking?    Vitamin D, Cholecalciferol, 1000 units CAPS  Self Yes Yes   Sig: Take 1,000 Units by mouth   atenolol (TENORMIN) 25 mg tablet   No Yes   Sig: Take 1 tablet (25 mg total) by mouth daily   famotidine (PEPCID) 20 mg tablet   No No   Sig: Take 1 tablet (20 mg total) by mouth 2 (two) times a day   intrauterine copper (PARAGARD) IUD  Self Yes No   Si each by Intrauterine route once   multivitamin (THERAGRAN) TABS  Self Yes Yes   Sig: Take 1 tablet by mouth   omeprazole (PriLOSEC) 40 MG capsule   No No   Sig: Take 1 capsule (40 mg total) by mouth 2 (two) times a day for 14 days   Patient not taking: Reported on 9/15/2021   ondansetron (ZOFRAN-ODT) 4 mg disintegrating tablet   No No   Sig: Take 1 tablet (4 mg total) by mouth every 6 (six) hours as needed for nausea   Patient not taking: Reported on 9/15/2021 Facility-Administered Medications: None       Past Medical History:   Diagnosis Date    Diverticulitis     Heart palpitations 2020    Varicella vaccination        Past Surgical History:   Procedure Laterality Date     SECTION      x4    COLONOSCOPY      2 benign polyps repeat age 50    INSERTION OF INTRAUTERINE DEVICE (IUD)  2020    Mirena    MAMMO STEREOTACTIC BREAST BIOPSY LEFT (ALL INC) Left 2021    MOUTH SURGERY      implant of tooth x 2    TX COLONOSCOPY FLX DX W/COLLJ SPEC WHEN PFRMD N/A 5/15/2017    Procedure: COLONOSCOPY;  Surgeon: Jackelyn Ramachandran MD;  Location: [facility]; Service: Gastroenterology    REMOVAL OF INTRAUTERINE DEVICE (IUD)  2020    Paraguard    WISDOM TOOTH EXTRACTION         Family History   Problem Relation Age of Onset    Brain cancer Maternal Grandmother     Breast cancer Maternal Aunt 79    Uterine cancer Mother     Diabetes Mother     Hypertension Mother     No Known Problems Father     Fibroids Sister         hysterectomy    Other Sister         bilateral hip replacement    Hypertension Brother     Liver disease Sister         cyst on liver    No Known Problems Sister     No Known Problems Sister     No Known Problems Sister     No Known Problems Brother     Lung disease Brother     Hypertension Brother     No Known Problems Brother     Other Brother         palpitations    No Known Problems Half-Brother     Lung cancer Maternal Uncle     No Known Problems Daughter     No Known Problems Maternal Grandfather     Hypertension Paternal Grandmother     Diabetes Paternal Grandmother     No Known Problems Paternal Grandfather     No Known Problems Daughter     No Known Problems Daughter     Fibroids Maternal Aunt 40        hysterectomy    Ovarian cancer Neg Hx     Colon cancer Neg Hx      I have reviewed and agree with the history as documented      E-Cigarette/Vaping    E-Cigarette Use Never User E-Cigarette/Vaping Substances    Nicotine No     THC No     CBD No     Flavoring No     Other No     Unknown No      Social History     Tobacco Use    Smoking status: Former Smoker    Smokeless tobacco: Never Used    Tobacco comment: Hooka   Vaping Use    Vaping Use: Never used   Substance Use Topics    Alcohol use: No    Drug use: No       Review of Systems   Constitutional: Positive for fatigue  Negative for chills and fever  HENT: Negative for ear pain and sore throat  Eyes: Negative for pain and visual disturbance  Respiratory: Negative for cough and shortness of breath  Cardiovascular: Negative for chest pain and palpitations  Gastrointestinal: Negative for abdominal pain and vomiting  Genitourinary: Negative for dysuria and hematuria  Musculoskeletal: Positive for myalgias  Negative for arthralgias and back pain  Skin: Negative for color change and rash  Neurological: Negative for seizures and syncope  All other systems reviewed and are negative  Physical Exam  Physical Exam  Vitals and nursing note reviewed  Constitutional:       General: She is not in acute distress  Appearance: She is well-developed  HENT:      Head: Normocephalic and atraumatic  Right Ear: External ear normal       Left Ear: External ear normal       Mouth/Throat:      Mouth: Mucous membranes are moist    Eyes:      Extraocular Movements: Extraocular movements intact  Cardiovascular:      Rate and Rhythm: Normal rate and regular rhythm  Heart sounds: No murmur heard  Pulmonary:      Effort: Pulmonary effort is normal  No respiratory distress  Breath sounds: Normal breath sounds  Chest:      Chest wall: No tenderness  Abdominal:      General: Bowel sounds are normal       Palpations: Abdomen is soft  Musculoskeletal:         General: Normal range of motion  Cervical back: Neck supple  Skin:     General: Skin is warm  Coloration: Skin is not pale  Findings: No rash  Neurological:      Mental Status: She is alert and oriented to person, place, and time  Psychiatric:         Behavior: Behavior normal          Vital Signs  ED Triage Vitals [01/01/22 2118]   Temperature Pulse Respirations Blood Pressure SpO2   98 7 °F (37 1 °C) 83 16 121/62 100 %      Temp Source Heart Rate Source Patient Position - Orthostatic VS BP Location FiO2 (%)   Oral Monitor Sitting Right arm --      Pain Score       --           Vitals:    01/01/22 2118 01/02/22 0047   BP: 121/62 112/58   Pulse: 83 70   Patient Position - Orthostatic VS: Sitting Sitting         Visual Acuity      ED Medications  Medications   ketorolac (TORADOL) injection 30 mg (30 mg Intramuscular Given 1/2/22 0048)   aluminum-magnesium hydroxide-simethicone (MYLANTA) oral suspension 30 mL (30 mL Oral Given 1/2/22 0050)   Lidocaine Viscous HCl (XYLOCAINE) 2 % mucosal solution 15 mL (15 mL Swish & Swallow Given 1/2/22 0050)       Diagnostic Studies  Results Reviewed     None                 XR chest 1 view portable   Final Result by Malachi Aguilar MD (01/02 5962)      No acute cardiopulmonary disease  Workstation performed: [id number]                    Procedures  ECG 12 Lead Documentation Only    Date/Time: 1/2/2022 12:33 AM  Performed by: Miranda Martin DO  Authorized by: Miranda Martin DO     Indications / Diagnosis:  Fatigue  Patient location:  ED  Interpretation:     Interpretation: normal    Rate:     ECG rate:  79    ECG rate assessment: normal    Rhythm:     Rhythm: sinus rhythm    Ectopy:     Ectopy: none    QRS:     QRS axis:  Normal    QRS intervals:  Normal  Conduction:     Conduction: normal    ST segments:     ST segments:  Normal  T waves:     T waves: normal               ED Course  ED Course as of 01/02/22 2300   Sun Jan 02, 2022   0024 Pt seen and examined   43 yo female who was diagnosed with COVID 12/22 and the past few days her body aches have been "in my ribs and my knees" and c/o being even more fatigued past few days  Pt also c/o feeling like food gets stuck in chest, but can tolerate both liquids and solids  Will give IM toradol, GI cocktail and check chest xray as pts main complaint is concern for pneumonia    0109 Chest xray neg for COVID pneumonia  Discussed continued supportive care  SBIRT 20yo+      Most Recent Value   SBIRT (22 yo +)    In order to provide better care to our patients, we are screening all of our patients for alcohol and drug use  Would it be okay to ask you these screening questions? Yes Filed at: 01/02/2022 0021   Initial Alcohol Screen: US AUDIT-C     1  How often do you have a drink containing alcohol? 0 Filed at: 01/02/2022 0021   2  How many drinks containing alcohol do you have on a typical day you are drinking? 0 Filed at: 01/02/2022 0021   3b  FEMALE Any Age, or MALE 65+: How often do you have 4 or more drinks on one occassion? 0 Filed at: 01/02/2022 0021   Audit-C Score 0 Filed at: 01/02/2022 0021   JORGE LUIS: How many times in the past year have you    Used an illegal drug or used a prescription medication for non-medical reasons? Never Filed at: 01/02/2022 0021                    MDM    Disposition  Final diagnoses:   Generalized weakness   Fatigue   COVID-19   Generalized body aches     Time reflects when diagnosis was documented in both MDM as applicable and the Disposition within this note     Time User Action Codes Description Comment    1/2/2022  1:09 AM Katy Deloris M Add [R53 1] Generalized weakness     1/2/2022  1:09 AM Katy Deloris M Add [R53 83] Fatigue     1/2/2022  1:10 AM Katy Deloris M Add [U07 1] COVID-19     1/2/2022  1:10 AM Charles Ojeda Add [R52] Generalized body aches       ED Disposition     ED Disposition Condition Date/Time Comment    Discharge Stable Sun Jan 2, 2022  1:09 AM Lesley Flores discharge to home/self care              Follow-up Information     Follow up With Specialties Details Why Contact Ekaterina Morgan MD Family Medicine  As needed [address]  [phone]            Discharge Medication List as of 1/2/2022  1:10 AM      CONTINUE these medications which have NOT CHANGED    Details   atenolol (TENORMIN) 25 mg tablet Take 1 tablet (25 mg total) by mouth daily, Starting Fri 4/16/2021, Normal      multivitamin (THERAGRAN) TABS Take 1 tablet by mouth, Historical Med      Vitamin D, Cholecalciferol, 1000 units CAPS Take 1,000 Units by mouth, Historical Med      famotidine (PEPCID) 20 mg tablet Take 1 tablet (20 mg total) by mouth 2 (two) times a day, Starting Tue 8/10/2021, Normal      intrauterine copper (PARAGARD) IUD 1 each by Intrauterine route once, Historical Med      omeprazole (PriLOSEC) 40 MG capsule Take 1 capsule (40 mg total) by mouth 2 (two) times a day for 14 days, Starting Fri 10/16/2020, Until Fri 4/16/2021, Normal      ondansetron (ZOFRAN-ODT) 4 mg disintegrating tablet Take 1 tablet (4 mg total) by mouth every 6 (six) hours as needed for nausea, Starting Tue 8/10/2021, Normal             No discharge procedures on file      PDMP Review     None          ED Provider  Electronically Signed by           Dallas Rankin DO  01/02/22 2313

## 2022-01-10 ENCOUNTER — CLINICAL SUPPORT (OUTPATIENT)
Dept: DENTISTRY | Facility: CLINIC | Age: 47
End: 2022-01-10

## 2022-01-10 VITALS — TEMPERATURE: 97.7 F | HEART RATE: 74 BPM | DIASTOLIC BLOOD PRESSURE: 68 MMHG | SYSTOLIC BLOOD PRESSURE: 116 MMHG

## 2022-01-10 DIAGNOSIS — K03.6 ACCRETIONS ON TEETH: ICD-10-CM

## 2022-01-10 DIAGNOSIS — K02.9 CARIES: Primary | ICD-10-CM

## 2022-01-10 DIAGNOSIS — Z00.00 ENCOUNTER FOR SCREENING AND PREVENTATIVE CARE: ICD-10-CM

## 2022-01-10 PROCEDURE — D0120 PERIODIC ORAL EVALUATION - ESTABLISHED PATIENT: HCPCS | Performed by: DENTIST

## 2022-01-10 PROCEDURE — D1110 PROPHYLAXIS - ADULT: HCPCS

## 2022-01-10 RX ORDER — AMOXICILLIN 500 MG/1
500 CAPSULE ORAL EVERY 8 HOURS SCHEDULED
Qty: 21 CAPSULE | Refills: 0 | Status: SHIPPED | OUTPATIENT
Start: 2022-01-10 | End: 2022-01-17

## 2022-01-10 NOTE — PROGRESS NOTES
Reviewed Medical History  ASA:II  Chief Complaint:none     Periodic Exam, Adult Prophy, Periocharting  Intraoral exam:site of exts #4 area does not appear to be healing as well as it should  DR VALENZUELA checked and found exudate present-gave pt  Amox  antibiotic  He feels she had allergic rx  to bone graft last month  Also hairy tongue and smell of smoking  Pt  Efren Salamanca she rarely smokes but says past heavy smoker hx  Recommend strongly quit smoking and elect  Tb, Listerine use and tongue scraper  Pt had Covid last month  Said she experienced menstrual cycle changes, happening more often than normal and body rash  Dr Olinda Ramesh believes change in mouth could be due to covid  Oral Hygiene:poor: heavy supro and subging  plaque, subging  calculus, heavy gen  bleeding  Perio :gen  4,5mm, gen  BUP  Hand scaled, Flossed, polished  Patient tolerated well  Dr Olinda Ramesh examined:called in script Amox  [address]  DR VALENZUELA  And I explained to pt  In detail she has perio and gingivitis  Dr VALENZUELA  translated to pt    NV:SRP two visits 90 mins each  Needs:CBCT      [facility] , [facility]

## 2022-01-12 ENCOUNTER — ANNUAL EXAM (OUTPATIENT)
Dept: OBGYN CLINIC | Facility: CLINIC | Age: 47
End: 2022-01-12
Payer: MEDICARE

## 2022-01-12 VITALS
HEIGHT: 66 IN | DIASTOLIC BLOOD PRESSURE: 64 MMHG | BODY MASS INDEX: 25.71 KG/M2 | WEIGHT: 160 LBS | SYSTOLIC BLOOD PRESSURE: 118 MMHG

## 2022-01-12 DIAGNOSIS — Z30.431 CONTRACEPTIVE, SURVEILLANCE, INTRAUTERINE DEVICE: ICD-10-CM

## 2022-01-12 DIAGNOSIS — Z12.4 PAP SMEAR FOR CERVICAL CANCER SCREENING: ICD-10-CM

## 2022-01-12 DIAGNOSIS — Z01.419 ENCOUNTER FOR ANNUAL ROUTINE GYNECOLOGICAL EXAMINATION: Primary | ICD-10-CM

## 2022-01-12 DIAGNOSIS — Z12.31 VISIT FOR SCREENING MAMMOGRAM: ICD-10-CM

## 2022-01-12 PROCEDURE — G0476 HPV COMBO ASSAY CA SCREEN: HCPCS | Performed by: OBSTETRICS & GYNECOLOGY

## 2022-01-12 PROCEDURE — G0145 SCR C/V CYTO,THINLAYER,RESCR: HCPCS | Performed by: OBSTETRICS & GYNECOLOGY

## 2022-01-12 PROCEDURE — 99396 PREV VISIT EST AGE 40-64: CPT | Performed by: OBSTETRICS & GYNECOLOGY

## 2022-01-12 NOTE — PROGRESS NOTES
Pt is a 42 y o  [id number] with Patient's last menstrual period was 12/15/2021  using Paragard for [facility] presents for preventive care  She notes the same partner since her last STI evaluation  In her lifetime she has been involved with 1 partner   Safe sexual practices (monogomy, condoms) are followed consistently  · She does  feel safe in the relationship  She does feel safe in her home  · Her calcium intake encompasses multivitamin, milk (cow, goat, almond, cashew, soy, etc), cheese and yogurt for a total of 4-5 servings daily on average  She does take additional Vitamin D (MVI or supplement)  · She exercises 5x week 9 months of the year times per week  · Her menses occur every  28 Days, last 8-10 days and require maxipad every 5-6 hours  Menstrual History:  OB History        4    Para   4    Term   4            AB        Living   4       SAB        IAB        Ectopic        Multiple        Live Births               Obstetric Comments   FT C/S x 4    Menarche: 14    -10/regular pad every 6 hours            Menarche age: 17  Patient's last menstrual period was 12/15/2021  ·      · She has never recieved an HPV vaccine  · tobacco use : does use tobacco              · Colonoscopy: 2020-benign polyps; repeat 7 years  · Pap: 2018-wnl, HRHPV neg, repeat collected today  · Mammogram: 3/8/2021-right wnl, left required dx imaging, led to benign breast bx, return to routine screening      Past Medical History:   Diagnosis Date    Diverticulitis     Heart palpitations 2020    Varicella vaccination        Past Surgical History:   Procedure Laterality Date     SECTION      x4    COLONOSCOPY      2 benign polyps repeat age 50    [facility] (I[facility]  2020    Mirena    MAMMO STEREOTACTIC BREAST BIOPSY LEFT (ALL INC) Left 2021    MOUTH SURGERY      implant of tooth x 2    MS COLONOSCOPY FLX DX W/COLLJ SPEC WHEN PFRMD N/A 5/15/2017 Procedure: COLONOSCOPY;  Surgeon: Meenakshi Marshall MD;  Location: [facility]; Service: Gastroenterology    REMOVAL OF INTRAUTERINE DEVICE (IUD)  2020    Paraguard    WISDOM TOOTH EXTRACTION         OB History    Para Term  AB Living   4 4 4     4   SAB IAB Ectopic Multiple Live Births                  # Outcome Date GA Lbr Isaias/2nd Weight Sex Delivery Anes PTL Lv   4 Term            3 Term            2 Term            1 Term               Obstetric Comments   FT C/S x 4      Menarche: 14      28/8-10/regular pad every 6 hours            Current Outpatient Medications:     amoxicillin (AMOXIL) 500 mg capsule, Take 1 capsule (500 mg total) by mouth every 8 (eight) hours for 7 days, Disp: 21 capsule, Rfl: 0    atenolol (TENORMIN) 25 mg tablet, Take 1 tablet (25 mg total) by mouth daily, Disp: 90 tablet, Rfl: 5    intrauterine copper (PARAGARD) IUD, 1 each by Intrauterine route once, Disp: , Rfl:     multivitamin (THERAGRAN) TABS, Take 1 tablet by mouth, Disp: , Rfl:     Vitamin D, Cholecalciferol, 1000 units CAPS, Take 1,000 Units by mouth, Disp: , Rfl:     No Known Allergies    Social History     Socioeconomic History    Marital status: /Civil Union     Spouse name: Zulma Cannon Number of children: 4    Years of education: HS    Highest education level: None   Occupational History    Occupation: lunch lady at [employer]   Tobacco Use    Smoking status: Light Tobacco Smoker    Smokeless tobacco: Never Used    Tobacco comment: Hooka occasions only   Vaping Use    Vaping Use: Never used   Substance and Sexual Activity    Alcohol use: No    Drug use: No    Sexual activity: Yes     Partners: Male     Birth control/protection: I U D       Comment: Lifetime patners; 1   Other Topics Concern    None   Social History Narrative    Jain: Mandaeism    Accepts blood products        Exercise: 5x/week x 30 min 9 months of the year and once weekly in winter    Calcium: Multivitamin daily, 1 c almond milk daily, 1 yogurt weekly, 1 cheese daily     Social Determinants of Health     Financial Resource Strain: Not on file   Food Insecurity: Not on file   Transportation Needs: Not on file   Physical Activity: Not on file   Stress: Not on file   Social Connections: Not on file   Intimate Partner Violence: Not on file   Housing Stability: Not on file       Family History   Problem Relation Age of Onset    Uterine cancer Mother     Diabetes Mother     Hypertension Mother     No Known Problems Father     Fibroids Sister         hysterectomy    Other Sister         bilateral hip replacement    Liver disease Sister         cyst on liver    Other Sister         hysterectomy due to menorrhagia    Kidney disease Sister     No Known Problems Sister     No Known Problems Sister     Hypertension Brother     No Known Problems Brother     Lung disease Brother     Hypertension Brother     No Known Problems Brother     Other Brother         palpitations    Brain cancer Maternal Grandmother     No Known Problems Maternal Grandfather     Hypertension Paternal Grandmother     Diabetes Paternal Grandmother     No Known Problems Paternal Grandfather     No Known Problems Daughter     No Known Problems Daughter     No Known Problems Daughter     Breast cancer Maternal Aunt 79    Fibroids Maternal Aunt 40        hysterectomy    Lung cancer Maternal Uncle     Stroke Maternal Uncle     No Known Problems Half-Brother     Ovarian cancer Neg Hx     Colon cancer Neg Hx        Blood pressure 118/64, height 5' 6" (1 676 m), weight 72 6 kg (160 lb), last menstrual period 12/15/2021, not currently breastfeeding  and Body mass index is 25 82 kg/m²  Physical Exam  Constitutional:       General: She is not in acute distress  Appearance: Normal appearance  She is well-developed and normal weight  She is not ill-appearing  HENT:      Head: Normocephalic and atraumatic     Eyes: Extraocular Movements: Extraocular movements intact  Conjunctiva/sclera: Conjunctivae normal    Neck:      Thyroid: No thyromegaly  Trachea: No tracheal deviation  Cardiovascular:      Rate and Rhythm: Normal rate and regular rhythm  Heart sounds: Normal heart sounds  Pulmonary:      Effort: Pulmonary effort is normal  No respiratory distress  Breath sounds: Normal breath sounds  No stridor  No wheezing or rales  Abdominal:      General: Bowel sounds are normal  There is no distension  Palpations: Abdomen is soft  There is no mass  Tenderness: There is no abdominal tenderness  There is no guarding or rebound  Hernia: No hernia is present  Musculoskeletal:         General: No tenderness  Normal range of motion  Cervical back: Normal range of motion and neck supple  Lymphadenopathy:      Cervical: No cervical adenopathy  Skin:     General: Skin is warm  Findings: No erythema or rash  Neurological:      Mental Status: She is alert and oriented to person, place, and time  Psychiatric:         Mood and Affect: Mood normal          Behavior: Behavior normal          Thought Content: Thought content normal          Judgment: Judgment normal          Breasts: breasts appear normal, no suspicious masses, no skin or nipple changes or axillary nodes, symmetric fibrous changes in both upper outer quadrants        vulva: normal external genitalia for age and no lesions, masses, epithelial changes, or exudate  vagina: color pink and rugae  well formed rugae  cervix: parous, no lesions ,  IUD string  3 cm and pap obtained  uterus: NSSC, AF, NT, mobile  adnexa: no masses or tenderness      A/P:  Pt is a 42 y o  [id number] with      Batsheva Landa was seen today for gynecologic exam     Diagnoses and all orders for this visit:    Encounter for annual routine gynecological examination    Pap smear for cervical cancer screening  -     Liquid-based pap, screening    Visit for screening mammogram  -     Mammo screening bilateral w 3d & cad; Future    Contraceptive, surveillance, intrauterine device    iud strings appropriately located

## 2022-01-19 LAB
LAB AP GYN PRIMARY INTERPRETATION: NORMAL
Lab: NORMAL

## 2022-02-07 ENCOUNTER — OFFICE VISIT (OUTPATIENT)
Dept: DENTISTRY | Facility: CLINIC | Age: 47
End: 2022-02-07

## 2022-02-07 VITALS — DIASTOLIC BLOOD PRESSURE: 58 MMHG | HEART RATE: 80 BPM | TEMPERATURE: 97.1 F | SYSTOLIC BLOOD PRESSURE: 118 MMHG

## 2022-02-07 DIAGNOSIS — K03.6 ACCRETIONS ON TEETH: Primary | ICD-10-CM

## 2022-02-07 PROCEDURE — D4341 PERIODONTAL SCALING AND ROOT PLANING - 4 OR MORE TEETH PER QUADRANT: HCPCS

## 2022-02-07 PROCEDURE — D4342 PERIODONTAL SCALING AND ROOT PLANING - 1 TO 3 TEETH PER QUADRANT: HCPCS

## 2022-02-07 NOTE — PROGRESS NOTES
ASA: 1  Type of Treatment:  Used Ultrasonic and Hand Scaling, Flossed, Polished, Subgingival Irrigation - post tx - Chlorhexidine  Reviewed OHI  - Brush 2X/day and Floss 1X/day  Oral Hygiene:  Fair  Plaque: Moderate    Calculus: Moderate   Bleeding:   Moderate    Stain:   Light    SC/RP:   UR/LR  Quad scaling and root planning  Applied Topical Anesthetic,   Administered  1 carpule, Short Needle, Lidocaine HCL 2 % and Epi 1 7 mL 1:100,000,    Treatment Plan:  no changes to tx plan       Dr  Exam:  Resident Dr Lucia Lee gave anesthesia today                 Referral:  No referral given  NV1:  SC/RP - UL/LL - 2 hrs  NV2:  post SRP eval - 60 min  NV3:  3 month perio maintenance - 60 min

## 2022-02-08 ENCOUNTER — OFFICE VISIT (OUTPATIENT)
Dept: DENTISTRY | Facility: CLINIC | Age: 47
End: 2022-02-08

## 2022-02-08 VITALS — SYSTOLIC BLOOD PRESSURE: 112 MMHG | TEMPERATURE: 97.5 F | DIASTOLIC BLOOD PRESSURE: 50 MMHG | HEART RATE: 75 BPM

## 2022-02-08 DIAGNOSIS — K05.6 PERIODONTAL DISEASE: Primary | ICD-10-CM

## 2022-02-08 PROCEDURE — D4341 PERIODONTAL SCALING AND ROOT PLANING - 4 OR MORE TEETH PER QUADRANT: HCPCS

## 2022-02-08 NOTE — PROGRESS NOTES
SRP UL/LL, trtmnt planned by Dr Davy Hinson and Marcelino Coyne on 1-10-22    Topical Benzo applied prior to injection on LL  Alexsander B infiltrations, short needle, 1 carp Carbo  Did not use anesthesia on UL  Scaled and root planed using Hand scale and cavitron: gen staining  Mod jonathan  Loc areas of gritty calc  OHI: showed pt how to use threaders w/ hand mirror  Stressed importance of proper HC      Pt states she had NOVABONE placed by OS after ext #4    NV: 4-6week re-eval  NNV: CBCT and impressions for  implant

## 2022-02-24 ENCOUNTER — OFFICE VISIT (OUTPATIENT)
Dept: DENTISTRY | Facility: CLINIC | Age: 47
End: 2022-02-24

## 2022-02-24 DIAGNOSIS — Z44.9 PROSTHESIS FITTING: Primary | ICD-10-CM

## 2022-02-24 PROCEDURE — D0470 DIAGNOSTIC CASTS: HCPCS | Performed by: DENTIST

## 2022-02-24 PROCEDURE — D0367 CONE BEAM CT CAPTURE AND INTERPRETATION WITH FIELD OF VIEW OF BOTH JAWS; WITH OR WITHOUT CRANIUM: HCPCS | Performed by: DENTIST

## 2022-02-24 PROCEDURE — D0140 LIMITED ORAL EVALUATION - PROBLEM FOCUSED: HCPCS | Performed by: DENTIST

## 2022-02-24 NOTE — PROGRESS NOTES
41yo F presents for evaluation of implant placement on the UR, LR, and UL regions  PMH reviewed  BP WNL  CBCT taken today  CBCT and bone levels will be assessed in conjunction with Dr Epifanio Varela to finalize the number and size of implants that will be required  Once number of implants, sites, and sizes are finalized, pt will be contacted in order to schedule visit for implant placement  Pt was advised the cost of the implants  Alginate impressions taken today and poured up  Diagnostic casts and CBCT will be reviewed by Dr Epifanio Varela to finalize case then pt will be reached by  to schedule  NV: Implant to be scheduled with Cale Hunt once tx plan is finalized

## 2022-03-11 ENCOUNTER — HOSPITAL ENCOUNTER (OUTPATIENT)
Dept: MAMMOGRAPHY | Facility: CLINIC | Age: 47
Discharge: HOME | End: 2022-03-11
Payer: MEDICARE

## 2022-03-11 DIAGNOSIS — Z12.31 ENCOUNTER FOR SCREENING MAMMOGRAM FOR MALIGNANT NEOPLASM OF BREAST: ICD-10-CM

## 2022-03-11 DIAGNOSIS — Z12.31 VISIT FOR SCREENING MAMMOGRAM: ICD-10-CM

## 2022-03-11 PROCEDURE — 77067 SCR MAMMO BI INCL CAD: CPT

## 2022-03-11 PROCEDURE — 77063 BREAST TOMOSYNTHESIS BI: CPT

## 2022-03-25 ENCOUNTER — OFFICE VISIT (OUTPATIENT)
Dept: DENTISTRY | Facility: CLINIC | Age: 47
End: 2022-03-25

## 2022-03-25 DIAGNOSIS — Z01.20 ENCOUNTER FOR DENTAL EXAMINATION: Primary | ICD-10-CM

## 2022-03-25 PROCEDURE — D0171 RE-EVALUATION - POST-OPERATIVE OFFICE VISIT: HCPCS

## 2022-03-25 NOTE — PROGRESS NOTES
Perio Re-eval after FM SRP    Reviewed meds and hhx: ASA II    CC: pt very happy w/ Max flipper  She has been wearing daily  She likes it so much she states she may want to hold off on having the implants placed  FM Probe after SRP reveals gen reduction in probe depths, loc 4mm w/ loc bup  OHI: discussed proper HC and proper care of Max flipper  I encouraged cont 3mrcs  Dr Merrilee Scheuermann:  Rec pt return next week for implant eval appt w/ Dr Mishel Oliver and Dr Gray Silence to review CBCT scan  Pt can then decide on her future treatment, implants vs cont use of flipper  Pt understands and agreed      NV: CBCT/Implant eval   NNV: 3MRC Perio Mt 60 mins

## 2022-05-12 ENCOUNTER — OFFICE VISIT (OUTPATIENT)
Dept: OBGYN CLINIC | Facility: MEDICAL CENTER | Age: 47
End: 2022-05-12
Payer: MEDICARE

## 2022-05-12 ENCOUNTER — APPOINTMENT (OUTPATIENT)
Dept: RADIOLOGY | Facility: MEDICAL CENTER | Age: 47
End: 2022-05-12
Payer: MEDICARE

## 2022-05-12 VITALS
BODY MASS INDEX: 26.1 KG/M2 | HEIGHT: 66 IN | SYSTOLIC BLOOD PRESSURE: 119 MMHG | DIASTOLIC BLOOD PRESSURE: 72 MMHG | HEART RATE: 82 BPM | WEIGHT: 162.4 LBS

## 2022-05-12 DIAGNOSIS — M17.11 PATELLOFEMORAL ARTHRITIS OF RIGHT KNEE: ICD-10-CM

## 2022-05-12 DIAGNOSIS — M17.12 ARTHRITIS OF LEFT KNEE: ICD-10-CM

## 2022-05-12 DIAGNOSIS — M17.11 ARTHRITIS OF RIGHT KNEE: ICD-10-CM

## 2022-05-12 DIAGNOSIS — Z01.89 ENCOUNTER FOR LOWER EXTREMITY COMPARISON IMAGING STUDY: ICD-10-CM

## 2022-05-12 DIAGNOSIS — M17.11 ARTHRITIS OF RIGHT KNEE: Primary | ICD-10-CM

## 2022-05-12 PROCEDURE — 73564 X-RAY EXAM KNEE 4 OR MORE: CPT

## 2022-05-12 PROCEDURE — 99204 OFFICE O/P NEW MOD 45 MIN: CPT | Performed by: ORTHOPAEDIC SURGERY

## 2022-05-12 RX ORDER — CELECOXIB 200 MG/1
200 CAPSULE ORAL DAILY PRN
Qty: 30 CAPSULE | Refills: 2 | Status: SHIPPED | OUTPATIENT
Start: 2022-05-12 | End: 2022-08-08

## 2022-05-12 NOTE — PROGRESS NOTES
Assessment/Plan     1  Arthritis of right knee    2  Encounter for lower extremity comparison imaging study    3  Arthritis of left knee    4  Patellofemoral arthritis of right knee      Orders Placed This Encounter   Procedures    XR knee 4+ vw right injury    XR knee 1 or 2 vw left    XR knee 4+ vw left injury    Ambulatory referral to Physical Therapy     Ms Flanagan has moderate right patellofemoral arthritis and mild left knee arthritis  We discussed treatment options as well as the risks and benefits of treatment options  Declined steroid injections  PT  Continue bracing for comfort  Celebrex prn pain  Avoid other NSAIDs  Can take Tylenol 1,000mg by mouth every 8 hours as needed for pain  Do not exceed 3,000mg per day  Return in about 6 weeks (around 6/23/2022)  I answered all of the patient's questions during the visit and provided education of the patient's condition during the visit  The patient verbalized understanding of the information given and agrees with the plan  This note was dictated using [employer] software  It may contain errors including improperly dictated words  Please contact physician directly for any questions  History of Present Illness   Chief complaint:   Chief Complaint   Patient presents with    Right Knee - Pain       HPI: Jasmyn Luz is a 41 y o  female that c/o bilateral knee pain  She was referred by her PCP Dr Luisito Medina  She has been having bilateral knee pain for a few years, denies any injury  She was seen by Blayne Wylie , [facility] in 2018 and was PT and prescribed Celebrex 200 mg  She did not to go PT due to insurance  She states her knees were doing well until Dec 2021, She states that time she had COVID and her knee pain got worse  She notes achy  pain and swelling that comes and goes over anterolateral and anterior bilateral knee, worse on the right  She notes right knee instability  Pain is worse with squatting, going up steps, and transitional positions  She has been using over the counter topical cream and taking IBU with relief  She also wears a brace for comfort  She has no history having injections, physical therapy or surgeries on bilateral knees  ROS:    See HPI for musculoskeletal review  All other systems reviewed are negative     Historical Information   Past Medical History:   Diagnosis Date    Diverticulitis     Heart palpitations 2020    Varicella vaccination      Past Surgical History:   Procedure Laterality Date    BREAST BIOPSY Left 2021    lt stereo bx calcs benign     SECTION      x4    COLONOSCOPY      2 benign polyps repeat age 50    INSERTION OF INTRAUTERINE DEVICE (IUD)  2020    Mirena    MAMMO STEREOTACTIC BREAST BIOPSY LEFT (ALL INC) Left 2021    MOUTH SURGERY      implant of tooth x 2    MT COLONOSCOPY FLX DX W/COLLJ SPEC WHEN PFRMD N/A 5/15/2017    Procedure: COLONOSCOPY;  Surgeon: Julianna Hernandez MD;  Location: [facility];   Service: Gastroenterology    REMOVAL OF INTRAUTERINE DEVICE (IUD)  2020    Paraguard    WISDOM TOOTH EXTRACTION       Social History   Social History     Substance and Sexual Activity   Alcohol Use No     Social History     Substance and Sexual Activity   Drug Use No     Social History     Tobacco Use   Smoking Status Light Tobacco Smoker   Smokeless Tobacco Never Used   Tobacco Comment    Hooka occasions only     Family History:   Family History   Problem Relation Age of Onset    Uterine cancer Mother     Diabetes Mother     Hypertension Mother     No Known Problems Father     Fibroids Sister         hysterectomy    Other Sister         bilateral hip replacement    Liver disease Sister         cyst on liver    Other Sister         hysterectomy due to menorrhagia    Kidney disease Sister     No Known Problems Sister     No Known Problems Sister     Hypertension Brother     No Known Problems Brother     Lung disease Brother     Hypertension Brother     No Known Problems Brother     Other Brother         palpitations    Brain cancer Maternal Grandmother     No Known Problems Maternal Grandfather     Hypertension Paternal Grandmother     Diabetes Paternal Grandmother     No Known Problems Paternal Grandfather     No Known Problems Daughter     No Known Problems Daughter     No Known Problems Daughter     Breast cancer Maternal Aunt 79    Fibroids Maternal Aunt 40        hysterectomy    Lung cancer Maternal Uncle     Stroke Maternal Uncle     No Known Problems Half-Brother     Ovarian cancer Neg Hx     Colon cancer Neg Hx        Current Outpatient Medications on File Prior to Visit   Medication Sig Dispense Refill    atenolol (TENORMIN) 25 mg tablet Take 1 tablet (25 mg total) by mouth daily 90 tablet 5    intrauterine copper (PARAGARD) IUD 1 each by Intrauterine route once      multivitamin (THERAGRAN) TABS Take 1 tablet by mouth      Vitamin D, Cholecalciferol, 1000 units CAPS Take 1,000 Units by mouth       No current facility-administered medications on file prior to visit  No Known Allergies    Current Outpatient Medications on File Prior to Visit   Medication Sig Dispense Refill    atenolol (TENORMIN) 25 mg tablet Take 1 tablet (25 mg total) by mouth daily 90 tablet 5    intrauterine copper (PARAGARD) IUD 1 each by Intrauterine route once      multivitamin (THERAGRAN) TABS Take 1 tablet by mouth      Vitamin D, Cholecalciferol, 1000 units CAPS Take 1,000 Units by mouth       No current facility-administered medications on file prior to visit  Objective   Vitals: Blood pressure 119/72, pulse 82, height 5' 6" (1 676 m), weight 73 7 kg (162 lb 6 4 oz), not currently breastfeeding  ,Body mass index is 26 21 kg/m²      PE:  AAOx 3  WDWN  Hearing intact, no drainage from eyes  Regular rate  no audible wheezing  no abdominal distension  LE compartments soft, skin intact    rightknee:    Appearance:  Mild generalized swelling   No ecchymosis  no obvious joint deformity   No effusion  Palpation/Tenderness:  No TTP over medial joint line  No TTP over lateral joint line   No TTP over patella  No TTP over patellar tendon  No TTP over pes anserine bursa  Active Range of Motion:  AROM: full  Special Tests:  Medial Sima's Test:  Negative  Lateral Sima's Test:  Negative  Apley's compression test:  Negative  Lachman's Test:  negative  Anterior Drawer Test:  Negative  Patellar grind:  Negative  Valgus Stress Test:  negative  Varus Stress Test:  negative     No ipsilateral hip pain with ROM      leftknee:    Appearance:  Mild generalized swelling   No ecchymosis  no obvious joint deformity   No effusion  Palpation/Tenderness:  No TTP over medial joint line  No TTP over lateral joint line   No TTP over patella  No TTP over patellar tendon  No TTP over pes anserine bursa  Active Range of Motion:  AROM: full  Special Tests:  Medial Sima's Test:  Positive  Lateral Sima's Test:  Negative  Apley's compression test:  Negative  Lachman's Test:  negative  Anterior Drawer Test:  Negative  Patellar grind:  Negative  Valgus Stress Test:  negative  Varus Stress Test:  negative     No ipsilateral hip pain with ROM    bilateralLE:    Sensation grossly intact  Palpable  pulse  AT/GS/EHL intact    Imaging Studies: I have personally reviewed pertinent films in PACS  XR bilateralknee:  moderate patellofemoral arthritis right knee, mild DJD L knee, as evidenced by joint space narrowing        Scribe Attestation    I,:  Merissa Lopez am acting as a scribe while in the presence of the attending physician :       I,:  Edison Barnett DO personally performed the services described in this documentation    as scribed in my presence :

## 2022-05-24 ENCOUNTER — EVALUATION (OUTPATIENT)
Dept: PHYSICAL THERAPY | Facility: REHABILITATION | Age: 47
End: 2022-05-24
Payer: MEDICARE

## 2022-05-24 DIAGNOSIS — M17.11 PATELLOFEMORAL ARTHRITIS OF RIGHT KNEE: ICD-10-CM

## 2022-05-24 DIAGNOSIS — M17.11 ARTHRITIS OF RIGHT KNEE: Primary | ICD-10-CM

## 2022-05-24 DIAGNOSIS — M17.12 ARTHRITIS OF LEFT KNEE: ICD-10-CM

## 2022-05-24 PROCEDURE — 97161 PT EVAL LOW COMPLEX 20 MIN: CPT | Performed by: PHYSICAL THERAPIST

## 2022-05-24 NOTE — PROGRESS NOTES
PT Evaluation     Today's date: 2022  Patient name: Carissa Singh  : 1975  MRN: [id number]  Referring provider: Annmarie Flynn  Dx:   Encounter Diagnosis     ICD-10-CM    1  Arthritis of right knee  M17 11 Ambulatory referral to Physical Therapy   2  Arthritis of left knee  M17 12 Ambulatory referral to Physical Therapy   3  Patellofemoral arthritis of right knee  M17 11 Ambulatory referral to Physical Therapy                  Assessment  Assessment details: Pt is a pleasant 41 y o  female presenting to outpatient physical therapy with Arthritis of right knee  Arthritis of left knee  Patellofemoral arthritis of right knee  Pt presents with pain, decreased range of motion, decreased strength, and decreased tolerance to activity  Displays movement impairment diagnosis of right knee hypomobility dysfunction  Symptoms eased post-gapping mobilizations to posterior knee joint  Instructed patient in self-mobilization techniques  Pt is a good candidate for outpatient physical therapy and would benefit from skilled physical therapy to address limitations and to achieve goals  Patient will attempt to self-treat via HEP  However, contact information given to patient and instructed to call should symptoms fail to improve or worsen  Thank you for this referral    Impairments: abnormal coordination, abnormal or restricted ROM, activity intolerance, impaired physical strength and pain with function  Understanding of Dx/Px/POC: good   Prognosis: good    Goals  ST  Patient will report 25% decrease in pain in 4 weeks  2  Patient will demonstrate 25% improvement in ROM in 4 weeks  3  Patient will demonstrate 1/2 grade improvement in strength in 4 weeks  LT  Patient will be able to perform IADLS without restriction or pain by discharge  2  Patient will be independent in HEP by discharge    3  Patient will be able to return to recreational/work duties without restriction or pain by discharge  Plan  Patient would benefit from: PT eval and skilled PT  Planned modality interventions: cryotherapy and thermotherapy: hydrocollator packs  Planned therapy interventions: IADL retraining, body mechanics training, flexibility, functional ROM exercises, home exercise program, neuromuscular re-education, manual therapy, postural training, strengthening, stretching, therapeutic activities, therapeutic exercise and joint mobilization  Frequency: 2x month  Duration in visits: 2  Duration in weeks: 4  Treatment plan discussed with: patient        Subjective Evaluation    History of Present Illness  Mechanism of injury: 22  Pt comes to therapy reporting initial onset of right knee pain in 2018 after performing an exercise  However, denies any issues following this injury until she was sick with COVID-19 this past December, which exacerbated knee pain  States she has pain bilaterally, however, R > L  Notes she consulted orthopedic physician, with plain film radiographs performed  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, squatting, transfers sit to stand, pressing on gas pedal   LOC: anterior R knee; pulling pain; Denies paresthesias  EASES: rest  Follows up with orthopedic physician in 6-weeks     Pain  Current pain ratin  At worst pain ratin    Patient Goals  Patient goals for therapy: decreased pain, increased motion and independence with ADLs/IADLs          Objective     Active Range of Motion   Left Knee   Flexion: WFL  Extension: WFL    Right Knee   Flexion: 125 degrees   Extension: WFL    Passive Range of Motion   Left Knee   Flexion: WFL  Extension: WFL    Right Knee   Flexion: 130 degrees with pain  Extension: WFL    Strength/Myotome Testing     Left Hip   Planes of Motion   Flexion: 5  Abduction: 4+  External rotation: 5  Internal rotation: 5    Right Hip   Planes of Motion   Flexion: 4+  Abduction: 4+  External rotation: 5  Internal rotation: 5    Left Knee Flexion: 5  Extension: 5    Right Knee   Flexion: 4+  Extension: 5    Left Ankle/Foot   Dorsiflexion: 5    Right Ankle/Foot   Dorsiflexion: 5    General Comments:    Lower quarter screen   Hips: unremarkable             Precautions: n/a    Daily Treatment Diary    Date 5/24            FOTO IE            Re-Eval IE               Manuals    R knee gapping mobs CARLO gr 4                                                   Neuro Re-Ed                                                                                                Ther Ex    Supine SKTC gapping mob c towel 10"x10            1/2 kneeling knee gapping mob c towel 10"x10            Prone knee gapping c towel demo                                                                             Ther Activity    Bike                           Gait Training                              Modalities

## 2022-06-06 ENCOUNTER — OFFICE VISIT (OUTPATIENT)
Dept: CARDIOLOGY CLINIC | Facility: CLINIC | Age: 47
End: 2022-06-06
Payer: MEDICARE

## 2022-06-06 VITALS
SYSTOLIC BLOOD PRESSURE: 104 MMHG | HEIGHT: 66 IN | WEIGHT: 166.2 LBS | HEART RATE: 79 BPM | DIASTOLIC BLOOD PRESSURE: 68 MMHG | BODY MASS INDEX: 26.71 KG/M2

## 2022-06-06 DIAGNOSIS — R00.2 PALPITATIONS: ICD-10-CM

## 2022-06-06 DIAGNOSIS — I49.3 PREMATURE VENTRICULAR CONTRACTIONS: Primary | ICD-10-CM

## 2022-06-06 PROCEDURE — 99214 OFFICE O/P EST MOD 30 MIN: CPT | Performed by: INTERNAL MEDICINE

## 2022-06-06 NOTE — PATIENT INSTRUCTIONS
CARDIOLOGY ASSESSMENT & PLAN     1  Premature ventricular contractions     2  Palpitations       Palpitations  Ms Iris Ramirez is overall doing well from cardiac perspective with significant improvement in her palpitations  Her blood pressure is well controlled  Her exercise tolerance is good  Her examination is benign  -- at this time I am advising her to continue current medications  -- she is advised to follow-up with her primary care physician regularly  -- cardiology follow-up can be arranged on an as-needed basis

## 2022-06-06 NOTE — ASSESSMENT & PLAN NOTE
Ms Teddy Robison is overall doing well from cardiac perspective with significant improvement in her palpitations  Her blood pressure is well controlled  Her exercise tolerance is good  Her examination is benign  -- at this time I am advising her to continue current medications  -- she is advised to follow-up with her primary care physician regularly  -- cardiology follow-up can be arranged on an as-needed basis

## 2022-06-06 NOTE — PROGRESS NOTES
[facility]  [address] [facility], [place] [address]  Phone#  [phone]  Fax#  [phone]  *-*-*-*-*-*-*-*-*-*-*-*-*-*-*-*-*-*-*-*-*-*-*-*-*-*-*-*-*-*-*-*-*-*-*-*-*-*-*-*-*-*-*-*-*-*-*-*-*-*-*-*-*-*  ENCOUNTER DATE: 22 3:35 PM  PATIENT NAME: Iris Ramirez   1975    [id number]  AGE:47 y o  SEX: female  Lola Funes MD     PRIMARY CARE PHYSICIAN: Mitch Best MD    DIAGNOSES:  1  Symptomatic premature ventricular contractions  2  History of  in the past  3  History of uterine implant  4  History of diverticulitis  5  Palpitations  6  Menorrhagia  7  Ovarian cyst  8  Colonic diverticulosis  9  H pylori infection involving distal esophagus     EXERCISE TREADMILL STRESS TEST 2021:  Exercise time was 9 minutes 45 seconds achieving 86% of her maximal age predicted heart rate  There were no diagnostic changes of ischemia , blood pressure was normal at rest and showed appropriate blood pressure response to exercise  No significant arrhythmia was noted during stress test       48 HOUR HOLTER MONITOR 2020: Average heart rate was 79 beats per minute, minimum was 52 and maximum was 140 beats per minute  Predominant sinus rhythm with 3 9% PVC burden some of which were in the form ventricular bigeminy and ventricular trigeminy  There were 2 ventricular couplets  There was no nonsustained or sustained ventricular tachycardia  Supraventricular ectopy burden was 0 0%  No symptoms were reported  ECHOCARDIOGRAM 2018:  - Normal left ventricular cavity size, wall thickness and systolic and diastolic function  EF approximately 60-65%  - No significant chamber hypertrophy or enlargement  - Mild aortic valve sclerosis, no aortic valve stenosis or regurgitation    - mild mitral valve leaflet sclerosis and early changes of mitral annular calcification, trace mitral and tricuspid valve regurgitation   - No obvious pulmonary hypertension   - No pericardial effusion  Exercise treadmill stress test in November 2018:  Stress results: Duration of exercise was 10 min and 30 sec  Target heart rate was achieved  There was no chest pain during stress  IMPRESSIONS: 1  Exercise treadmill stress test is negative for ischemia at 91% of patient's age predicted maximal heart rate  2  Good exercise capacity  3  Normal resting blood pressure and appropriate blood pressure response to exercise  4  No chest pain reported with exercise  5  No significant arrhythmia noted with exercise  CURRENT ECG   No results found for this visit on 06/06/22  CARDIOLOGY ASSESSMENT & PLAN     1  Premature ventricular contractions     2  Palpitations       Palpitations  Ms Kristina Anderson is overall doing well from cardiac perspective with significant improvement in her palpitations  Her blood pressure is well controlled  Her exercise tolerance is good  Her examination is benign  -- at this time I am advising her to continue current medications  -- she is advised to follow-up with her primary care physician regularly  -- cardiology follow-up can be arranged on an as-needed basis  INTERVAL HISTORY & HISTORY OF PRESENT ILLNESS     Cheri SABA Flanagan is here for follow-up regarding her cardiac comorbidities which include:  History of palpitations, PVCs  She is here for follow-up    she was last seen by me in April 2021  She reports that overall she has been feeling very good with minimal amount of palpitations which occur only if she eats something heavy  She has had no recent sustained palpitations presyncope/syncope, chest pain, orthopnea, PND or worsening pedal edema  Functional capacity status:  Good   (Excellent- >10 METs; Good: (7-10 METs); Moderate (4-7 METs); Poor (<= 4 METs)    Any chronic stressors:  None   (feeling of poor health, financial problems, and social isolation etc)      Tobacco or alcohol dependence:  She does not smoke and she does not drink     Current cardiac medications:  Atenolol 25 mg she takes every other day  Last blood work was in October 2021 her renal function and electrolytes were normal   Lipids showed total cholesterol 180, triglyceride 135, HDL 38, calculated   REVIEW OF SYSTEMS   Positive for:  As noted above in HPI  Negative for: All remaining as reviewed below and in HPI  SYSTEM SYMPTOMS REVIEWED:  General--weight change, fever, night sweats  Respiratory--cough, wheezing, shortness of breath, sputum production  Cardiovascular--chest pain, syncope, dyspnea on exertion, edema, decline in exercise tolerance, claudication   Gastrointestinal--persistent vomiting, diarrhea, abdominal distention, blood in stool   Muscular or skeletal--joint pain or swelling   Neurologic--headaches, syncope, abnormal movement  Hematologic--history of easy bruising and bleeding   Endocrine--thyroid enlargement, heat or cold intolerance, polyuria   Psychiatric--anxiety, depression     *-*-*-*-*-*-*-*-*-*-*-*-*-*-*-*-*-*-*-*-*-*-*-*-*-*-*-*-*-*-*-*-*-*-*-*-*-*-*-*-*-*-*-*-*-*-*-*-*-*-*-*-*-*-  VITAL SIGNS     CURRENT VITAL SIGNS:   Vitals:    06/06/22 1522   BP: 104/68   BP Location: Left arm   Patient Position: Sitting   Cuff Size: Standard   Pulse: 79   Weight: 75 4 kg (166 lb 3 2 oz)   Height: 5' 6" (1 676 m)       BMI: Body mass index is 26 83 kg/m²      WEIGHTS:   Wt Readings from Last 25 Encounters:   06/06/22 75 4 kg (166 lb 3 2 oz)   05/12/22 73 7 kg (162 lb 6 4 oz)   01/12/22 72 6 kg (160 lb)   01/01/22 72 4 kg (159 lb 9 8 oz)   09/15/21 74 4 kg (164 lb)   08/31/21 75 3 kg (166 lb)   08/12/21 73 9 kg (163 lb)   08/03/21 76 2 kg (168 lb)   04/21/21 74 4 kg (164 lb)   04/16/21 74 4 kg (164 lb)   04/01/21 73 5 kg (162 lb)   03/08/21 73 5 kg (162 lb)   01/07/21 72 6 kg (160 lb)   12/01/20 72 6 kg (160 lb)   11/24/20 72 1 kg (159 lb)   10/29/20 75 8 kg (167 lb)   10/09/20 73 9 kg (163 lb)   09/24/20 74 3 kg (163 lb 12 8 oz)   08/19/20 73 9 kg (163 lb)   08/17/20 73 1 kg (161 lb 3 2 oz)   08/14/20 72 5 kg (159 lb 12 8 oz)   08/04/20 73 7 kg (162 lb 6 4 oz)   07/23/20 73 5 kg (162 lb)   07/14/20 73 5 kg (162 lb)   01/02/20 79 1 kg (174 lb 4 8 oz)        *-*-*-*-*-*-*-*-*-*-*-*-*-*-*-*-*-*-*-*-*-*-*-*-*-*-*-*-*-*-*-*-*-*-*-*-*-*-*-*-*-*-*-*-*-*-*-*-*-*-*-*-*-*-  PHYSICAL EXAM     General Appearance:    Alert, cooperative, no distress, appears stated age   Head, Eyes, ENT:    No obvious abnormality, moist mucous mebranes  Neck:   Supple, no carotid bruit or JVD   Back:     Symmetric, no curvature  Lungs:     Respirations unlabored  Clear to auscultation bilaterally,    Chest wall:    No tenderness or deformity   Heart:    Regular rate and rhythm, S1 and S2 normal, no murmur, rub  or gallop  Abdomen:     Soft, non-tender, No obvious masses, or organomegaly   Extremities:   Extremities warm, no cyanosis or edema    Skin:   no venostatic changes in lower extremities  Normal skin color, texture, and turgor   No rashes or lesions     *-*-*-*-*-*-*-*-*-*-*-*-*-*-*-*-*-*-*-*-*-*-*-*-*-*-*-*-*-*-*-*-*-*-*-*-*-*-*-*-*-*-*-*-*-*-*-*-*-*-*-*-*-*-  CURRENT MEDICATIONS LIST     Current Outpatient Medications:     atenolol (TENORMIN) 25 mg tablet, Take 1 tablet (25 mg total) by mouth daily, Disp: 90 tablet, Rfl: 5    celecoxib (CeleBREX) 200 mg capsule, Take 1 capsule (200 mg total) by mouth as needed in the morning for mild pain , Disp: 30 capsule, Rfl: 2    intrauterine copper (PARAGARD) IUD, 1 each by Intrauterine route once, Disp: , Rfl:     multivitamin (THERAGRAN) TABS, Take 1 tablet by mouth, Disp: , Rfl:     Vitamin D, Cholecalciferol, 1000 units CAPS, Take 1,000 Units by mouth, Disp: , Rfl:        ALLERGIES   No Known Allergies    *-*-*-*-*-*-*-*-*-*-*-*-*-*-*-*-*-*-*-*-*-*-*-*-*-*-*-*-*-*-*-*-*-*-*-*-*-*-*-*-*-*-*-*-*-*-*-*-*-*-*-*-*-*-  LABORATORY DATA   No results found for: NA  Potassium   Date Value Ref Range Status   08/10/2021 3 7 3 5 - 5 3 mmol/L Final   11/05/2019 4 3 3 6 - 5 0 mmol/L Final     Chloride   Date Value Ref Range Status   08/10/2021 105 100 - 108 mmol/L Final   11/05/2019 103 97 - 108 mmol/L Final     CO2   Date Value Ref Range Status   08/10/2021 30 21 - 32 mmol/L Final   11/05/2019 29 22 - 30 mmol/L Final     BUN   Date Value Ref Range Status   08/10/2021 7 5 - 25 mg/dL Final   11/05/2019 9 5 - 25 mg/dL Final     Creatinine   Date Value Ref Range Status   08/10/2021 0 73 0 60 - 1 30 mg/dL Final     Comment:     Standardized to IDMS reference method   11/05/2019 0 67 0 60 - 1 20 mg/dL Final     Comment:     Standardized to IDMS reference method     eGFR   Date Value Ref Range Status   08/10/2021 100 ml/min/1 73sq m Final   11/05/2019 109 >60 ml/min/1 73sq m Final     Calcium   Date Value Ref Range Status   08/10/2021 8 8 8 3 - 10 1 mg/dL Final   11/05/2019 9 3 8 4 - 10 2 mg/dL Final     AST   Date Value Ref Range Status   08/10/2021 12 5 - 45 U/L Final     Comment:     Specimen collection should occur prior to Sulfasalazine administration due to the potential for falsely depressed results  11/05/2019 21 14 - 36 U/L Final     Comment:       Specimen collection should occur prior to Sulfasalazine administration due to the potential for falsely depressed results  ALT   Date Value Ref Range Status   08/10/2021 23 12 - 78 U/L Final     Comment:     Specimen collection should occur prior to Sulfasalazine administration due to the potential for falsely depressed results  11/05/2019 22 9 - 52 U/L Final     Comment:       Specimen collection should occur prior to Sulfasalazine administration due to the potential for falsely depressed results        Alkaline Phosphatase   Date Value Ref Range Status   08/10/2021 58 46 - 116 U/L Final   11/05/2019 49 43 - 122 U/L Final     Magnesium   Date Value Ref Range Status   08/10/2021 2 2 1 6 - 2 6 mg/dL Final     WBC   Date Value Ref Range Status   08/10/2021 10 47 (H) 4 31 - 10 16 Thousand/uL Final   2020 8 10 4 50 - 11 00 Thousand/uL Final   2019 9 30 4 50 - 11 00 Thousand/uL Final     Hemoglobin   Date Value Ref Range Status   08/10/2021 12 6 11 5 - 15 4 g/dL Final   2020 12 7 12 0 - 16 0 g/dL Final   2019 12 8 12 0 - 16 0 g/dL Final     Platelets   Date Value Ref Range Status   08/10/2021 319 149 - 390 Thousands/uL Final   2020 261 150 - 450 Thousands/uL Final   2019 264 150 - 450 Thousands/uL Final     No results found for: PT, PTT, INR  No results found for: CKMB, DIGOXIN  No results found for: TSH  No results found for: CHOL   No results found for: HGBA1C  No results found for: Ricks , GRAMSTAIN, URINECX, WOUNDCULT, BODYFLUIDCUL, MRSACULTURE, INFLUAPCR, INFLUBPCR, RSVPCR, LEGIONELLAUR, CDIFFTOXINB    *-*-*-*-*-*-*-*-*-*-*-*-*-*-*-*-*-*-*-*-*-*-*-*-*-*-*-*-*-*-*-*-*-*-*-*-*-*-*-*-*-*-*-*-*-*-*-*-*-*-*-*-*-*-  PREVIOUS CARDIOLOGY & RADIOLOGY TEST RESULTS   I have personally reviewed pertinent results of cardiovascular tests noted below      Results for orders placed during the hospital encounter of 18    Echo complete with contrast if indicated    Narrative  [facility] [employer] [address]    Transthoracic Echocardiogram  2D, M-mode, Doppler, and Color Doppler    Study date:  2018    Patient: Lauren Tsai  MR number: [id number]  Account number: [de-identified]  : 1975  Age: 41 years  Gender: Female  Status: Outpatient  Location: [address] [place] [address]  Height: 67 in  Weight: 160 lb  BP: 108/ 70 mmHg    Indications: Tachycardia, palpitations    Diagnoses: R00 0 - Tachycardia, unspecified    Sonographer:  Ghassan Class, CCT,RCS  Primary Physician:  Puneet Ureña MD  Referring Physician:  Jaye Arnold MD  Group:  [facility]  Interpreting Physician:  Jaye Arnold MD    IMPRESSIONS:  - Normal left ventricular cavity size, wall thickness and systolic and diastolic function  EF approximately 60-65%  - No significant chamber hypertrophy or enlargement  - Mild aortic valve sclerosis, no aortic valve stenosis or regurgitation  - mild mitral valve leaflet sclerosis and early changes of mitral annular calcification, trace mitral and tricuspid valve regurgitation   - No obvious pulmonary hypertension   - No pericardial effusion  No previous echocardiogram is available for comparison  SUMMARY    LEFT VENTRICLE:  Normal left ventricular cavity size, normal wall thickness, normal left ventricle systolic function and wall motion  Ejection fraction is estimated as around 60-65%  Normal diastolic function  RIGHT VENTRICLE:  Normal right ventricular size and systolic function  Normal estimated right ventricular systolic pressure  LEFT ATRIUM:  Normal left atrial cavity size  Intact interatrial septum  RIGHT ATRIUM:  Normal right atrial cavity size  MITRAL VALVE:  Mild mitral valve leaflet sclerosis, early changes of mitral annular calcification  Trace mitral valve regurgitation  AORTIC VALVE:  Tricuspid aortic valve with mild sclerosis  No aortic valve stenosis or regurgitation  TRICUSPID VALVE:  Trace tricuspid valve regurgitation  PULMONIC VALVE:  No significant pulmonic valve regurgitation  AORTA:  Aortic root and proximal ascending aorta are normal in size on 2D imaging  IVC, HEPATIC VEINS:  Inferior vena cava is normal in size and demonstrates appropriate respiratory phasic changes in diameter  PERICARDIUM:  No pericardial effusion  HISTORY: PRIOR HISTORY: Palpitations, former smoker    PROCEDURE: The study was performed in the [employer]  This was a routine study  The transthoracic approach was used  The study included complete 2D imaging, M-mode, complete spectral Doppler, and color Doppler  The heart rate was  72 bpm, at the start of the study   Images were obtained from the parasternal, apical, subcostal, and suprasternal notch acoustic windows  Image quality was adequate  LEFT VENTRICLE: Normal left ventricular cavity size, normal wall thickness, normal left ventricle systolic function and wall motion  Ejection fraction is estimated as around 60-65%  Normal diastolic function  RIGHT VENTRICLE: Normal right ventricular size and systolic function  Normal estimated right ventricular systolic pressure  LEFT ATRIUM: Normal left atrial cavity size  Intact interatrial septum  RIGHT ATRIUM: Normal right atrial cavity size  MITRAL VALVE: Mild mitral valve leaflet sclerosis, early changes of mitral annular calcification  Trace mitral valve regurgitation  AORTIC VALVE: Tricuspid aortic valve with mild sclerosis  No aortic valve stenosis or regurgitation  TRICUSPID VALVE: Trace tricuspid valve regurgitation  PULMONIC VALVE: No significant pulmonic valve regurgitation  PERICARDIUM: No pericardial effusion  AORTA: Aortic root and proximal ascending aorta are normal in size on 2D imaging  SYSTEMIC VEINS: IVC: Inferior vena cava is normal in size and demonstrates appropriate respiratory phasic changes in diameter      SYSTEM MEASUREMENT TABLES    2D  %FS: 32 43 %  Ao Diam: 2 76 cm  EDV(Teich): 84 73 ml  EF(Teich): 61 01 %  ESV(Teich): 33 04 ml  IVSd: 0 93 cm  LA Area: 14 46 cm2  LA Diam: 2 87 cm  LVEDV MOD A4C: 98 34 ml  LVEF MOD A4C: 64 89 %  LVESV MOD A4C: 34 52 ml  LVIDd: 4 34 cm  LVIDs: 2 93 cm  LVLd A4C: 8 36 cm  LVLs A4C: 6 8 cm  LVPWd: 0 86 cm  RA Area: 9 46 cm2  RVIDd: 3 02 cm  SV MOD A4C: 63 81 ml  SV(Teich): 51 7 ml    CW  AV Vmax: 1 62 m/s  AV maxPG: 10 44 mmHg  RAP: 10 mmHg  TR Vmax: 2 27 m/s  TR maxP 6 mmHg    PW  E': 0 15 m/s  E' Sept: 0 15 m/s  E/E': 5 13  E/E' Sept: 5 05  LVOT Vmax: 0 96 m/s  LVOT maxPG: 3 69 mmHg  MV A [place]: 0 65 m/s  MV Dec [place]: 3 72 m/s2  MV DecT: 207 9 ms  MV E Ramsey: 0 77 m/s  MV E/A Ratio: 1 19  MV PHT: 60 29 ms  MVA By PHT: 3 65 cm2  PAEDP: 15 56 mmHg  PRend P 56 mmHg  PRend Vmax: 1 18 m/s  RVSP: 30 6 mmHg    [facility]    Prepared and electronically signed by    Lauren Sapp MD  Signed [id number] 10:30:11    No results found for this or any previous visit  No results found for this or any previous visit  No results found for this or any previous visit  XR knee 4+ vw left injury  Narrative: LEFT KNEE    INDICATION:   M17 12: Unilateral primary osteoarthritis, left knee  COMPARISON:  None    VIEWS:  XR KNEE 4+ VW LEFT INJURY     FINDINGS:    Bones are intact  Alignment and joint spaces preserved  Bone mineralization is within normal limits  Small medial and patellofemoral osteophytes  Soft tissues are within normal limits  No evidence of effusion  Impression: No acute osseous abnormality  Mild osteoarthritis  Workstation performed: [id number]  XR knee 4+ vw right injury  Narrative: RIGHT KNEE    INDICATION:   M17 11: Unilateral primary osteoarthritis, right knee  COMPARISON:  4/28/2018    VIEWS:  XR KNEE 4+ VW RIGHT INJURY     FINDINGS:    Bones are intact  Alignment and joint spaces preserved  Bone mineralization is within normal limits  Similar small tricompartment osteophytes, greatest patellofemoral compartment  Similar ossicle or enthesopathy lateral to the patella, questioned related to the lateral retinacula  Soft tissues are within normal limits  No evidence of effusion  No acute change  Impression: No acute osseous abnormality compared to 2018  Mild osteoarthritis and other nonemergent findings described above         Workstation performed: [id number]        *-*-*-*-*-*-*-*-*-*-*-*-*-*-*-*-*-*-*-*-*-*-*-*-*-*-*-*-*-*-*-*-*-*-*-*-*-*-*-*-*-*-*-*-*-*-*-*-*-*-*-*-*-*-  SIGNATURES:   @QZQ@   Lauren Sapp MD; MHA    *-*-*-*-*-*-*-*-*-*-*-*-*-*-*-*-*-*-*-*-*-*-*-*-*-*-*-*-*-*-*-*-*-*-*-*-*-*-*-*-*-*-*-*-*-*-*-*-*-*-*-*-*-*-  PAST MEDICAL HISTORY:  Past Medical History:   Diagnosis Date    Diverticulitis     Heart palpitations 2020    Varicella vaccination     PAST SURGICAL HISTORY:  Past Surgical History:   Procedure Laterality Date    BREAST BIOPSY Left 2021    lt stereo bx calcs benign     SECTION      x4    COLONOSCOPY      2 benign polyps repeat age 50    INSERTION OF INTRAUTERINE DEVICE (IUD)  2020    Mirena    MAMMO STEREOTACTIC BREAST BIOPSY LEFT (ALL INC) Left 2021    MOUTH SURGERY      implant of tooth x 2    UT COLONOSCOPY FLX DX W/COLLJ SPEC WHEN PFRMD N/A 5/15/2017    Procedure: COLONOSCOPY;  Surgeon: Enoch Haddad MD;  [facility]: [facility];   Service: Gastroenterology    REMOVAL OF INTRAUTERINE DEVICE (IUD)  2020    Paraguard    WISDOM TOOTH EXTRACTION           FAMILY HISTORY:  Family History   Problem Relation Age of Onset    Uterine cancer Mother     Diabetes Mother     Hypertension Mother     No Known Problems Father     Fibroids Sister         hysterectomy    Other Sister         bilateral hip replacement    Liver disease Sister         cyst on liver    Other Sister         hysterectomy due to menorrhagia    Kidney disease Sister     No Known Problems Sister     No Known Problems Sister     Hypertension Brother     No Known Problems Brother     Lung disease Brother     Hypertension Brother     No Known Problems Brother     Other Brother         palpitations    Brain cancer Maternal Grandmother     No Known Problems Maternal Grandfather     Hypertension Paternal Grandmother     Diabetes Paternal Grandmother     No Known Problems Paternal Grandfather     No Known Problems Daughter     No Known Problems Daughter     No Known Problems Daughter     Breast cancer Maternal Aunt 79    Fibroids Maternal Aunt 40        hysterectomy    Lung cancer Maternal Uncle     Stroke Maternal Uncle     No Known Problems Half-Brother     Ovarian cancer Neg Hx     Colon cancer Neg Hx SOCIAL HISTORY:  Social History     Tobacco Use   Smoking Status Light Tobacco Smoker   Smokeless Tobacco Never Used   Tobacco Comment    Hooka occasions only      Social History     Substance and Sexual Activity   Alcohol Use No     Social History     Substance and Sexual Activity   Drug Use No    [id number]     *-*-*-*-*-*-*-*-*-*-*-*-*-*-*-*-*-*-*-*-*-*-*-*-*-*-*-*-*-*-*-*-*-*-*-*-*-*-*-*-*-*-*-*-*-*-*-*-*-*-*-*-*-*  ALLERGIES:  No Known Allergies CURRENT SCHEDULED MEDICATIONS:    Current Outpatient Medications:     atenolol (TENORMIN) 25 mg tablet, Take 1 tablet (25 mg total) by mouth daily, Disp: 90 tablet, Rfl: 5    celecoxib (CeleBREX) 200 mg capsule, Take 1 capsule (200 mg total) by mouth as needed in the morning for mild pain , Disp: 30 capsule, Rfl: 2    intrauterine copper (PARAGARD) IUD, 1 each by Intrauterine route once, Disp: , Rfl:     multivitamin (THERAGRAN) TABS, Take 1 tablet by mouth, Disp: , Rfl:     Vitamin D, Cholecalciferol, 1000 units CAPS, Take 1,000 Units by mouth, Disp: , Rfl:      *-*-*-*-*-*-*-*-*-*-*-*-*-*-*-*-*-*-*-*-*-*-*-*-*-*-*-*-*-*-*-*-*-*-*-*-*-*-*-*-*-*-*-*-*-*-*-*-*-*-*-*-*-*

## 2022-06-11 DIAGNOSIS — I49.3 PVC'S (PREMATURE VENTRICULAR CONTRACTIONS): ICD-10-CM

## 2022-06-11 DIAGNOSIS — R00.2 PALPITATIONS: ICD-10-CM

## 2022-06-13 RX ORDER — ATENOLOL 25 MG/1
TABLET ORAL
Qty: 90 TABLET | Refills: 0 | Status: SHIPPED | OUTPATIENT
Start: 2022-06-13

## 2022-06-27 ENCOUNTER — OFFICE VISIT (OUTPATIENT)
Dept: DENTISTRY | Facility: CLINIC | Age: 47
End: 2022-06-27

## 2022-06-27 VITALS — DIASTOLIC BLOOD PRESSURE: 70 MMHG | TEMPERATURE: 97.7 F | HEART RATE: 86 BPM | SYSTOLIC BLOOD PRESSURE: 104 MMHG

## 2022-06-27 DIAGNOSIS — Z01.21 ENCOUNTER FOR DENTAL EXAMINATION AND CLEANING WITH ABNORMAL FINDINGS: Primary | ICD-10-CM

## 2022-06-27 PROBLEM — K05.30 PERIODONTITIS: Status: ACTIVE | Noted: 2022-06-27

## 2022-06-27 PROCEDURE — D4910 PERIODONTAL MAINTENANCE: HCPCS

## 2022-06-27 NOTE — PROGRESS NOTES
Prophy    Dental procedures in this visit     - PERIODONTAL MAINTENANCE (Completed)     Service provider: Chhaya Bailey [facility], [address]     Billing provider: Kasi De Souza DDS   Rev med/dent hist with pt in Epic  ASA-II    Pts CC: " I had a scan completed here in 2/22 to evaluate bone for implants and no one called me to let me know how things looked  I moved forward and got a flipper made at my friends/neighbors office  It fits well and I am happy with it  I no longer need implants done here "    Rec patient return for implant eval with Dr VALENZUELA/New resident to review CBCT  Patient can then decide to move forward with implants or continue wearing max flipper  Method Used:Cleaned max flipper in the ultrasonic   · Prophy Method Used: Hand Scaling  · Polished  · Flossed    Radiographs Taken:  · None    Intra/Extra Oral Cancer Screening:  · Within normal limits      Oral Hygiene:  · Good-Patient states that she is no longer seeing blood  Plaque:  · Generalized  · Light    Calculus:  · Localized  · Light  · Lower anteriors    Bleeding:  · Bleeding on probing: 3 51% -#7-B and #8-MB readings  · Localized  · Light    Stain:  · Generalized  · Light    Periodontal Charting:SG = 2B  Full probing  / Shows great improvement in all quads    Periodontal Classification:Max ants  · Localized  · Mild  · Gingivitis      Nutritional Counseling:  · N/A    OHI: Patient is flossing daily and using floss threaders under crs #20 ,21 that are attached  Patient demonstrated how she is getting under the ging  Brushing 2 times a day for 2 mins  Tc Lucio    No orders of the defined types were placed in this encounter              NV:3 mths perio maint with periodic exam and FMX

## 2022-06-30 ENCOUNTER — OFFICE VISIT (OUTPATIENT)
Dept: OBGYN CLINIC | Facility: CLINIC | Age: 47
End: 2022-06-30
Payer: MEDICARE

## 2022-06-30 VITALS
DIASTOLIC BLOOD PRESSURE: 74 MMHG | WEIGHT: 164.5 LBS | SYSTOLIC BLOOD PRESSURE: 115 MMHG | HEIGHT: 66 IN | HEART RATE: 83 BPM | BODY MASS INDEX: 26.44 KG/M2

## 2022-06-30 DIAGNOSIS — M17.11 PATELLOFEMORAL ARTHRITIS OF RIGHT KNEE: ICD-10-CM

## 2022-06-30 DIAGNOSIS — M17.11 ARTHRITIS OF RIGHT KNEE: Primary | ICD-10-CM

## 2022-06-30 DIAGNOSIS — M17.12 PRIMARY OSTEOARTHRITIS OF LEFT KNEE: ICD-10-CM

## 2022-06-30 PROCEDURE — 99213 OFFICE O/P EST LOW 20 MIN: CPT | Performed by: ORTHOPAEDIC SURGERY

## 2022-06-30 NOTE — PROGRESS NOTES
Assessment/Plan:  1  Arthritis of right knee    2  Patellofemoral arthritis of right knee    3  Primary osteoarthritis of left knee      No orders of the defined types were placed in this encounter  · Patient has moderate right knee patellofemoral OA and mild left knee OA  · Patient is feeling much improved since her previous visit  She is aware she could have steroid injection if her pain worsens  · Continue home exercises from PT    · May take celebrex for pain as needed  · May wear knee brace for comfort  Return if symptoms worsen or fail to improve  I answered all of the patient's questions during the visit and provided education of the patient's condition during the visit  The patient verbalized understanding of the information given and agrees with the plan  This note was dictated using [employer] software  It may contain errors including improperly dictated words  Please contact physician directly for any questions  Subjective   Chief Complaint:   Chief Complaint   Patient presents with    Right Knee - Follow-up       HPI  Raudel Benito is a 41 y o  female who presents for follow up for right knee pain due to moderate patellofemoral arthritis and mild left knee osteoarthritis  Patient states she is doing better since last office visit, 80% overall  At the last office visit she did decline a steroid injection  She states she did go to physical therapy and she does see improvement with her mobility  She still having pain over the anterior anterior lateral aspect of the right knee that comes and goes  She states it is a squeezing pain  She denies any instability or locking  Pain is worse with standing and squatting  She has been taking Celebrex as needed for pain relief  She occasionally wears a knee brace as needed for comfort  Review of Systems  ROS:    See HPI for musculoskeletal review     All other systems reviewed are negative     History:  Past Medical History:   Diagnosis Date    Diverticulitis     Heart palpitations 2020    Varicella vaccination      Past Surgical History:   Procedure Laterality Date    BREAST BIOPSY Left 2021    lt stereo bx calcs benign     SECTION      x4    COLONOSCOPY      2 benign polyps repeat age 50    INSERTION OF INTRAUTERINE DEVICE (IUD)  2020    Mirena    MAMMO STEREOTACTIC BREAST BIOPSY LEFT (ALL INC) Left 2021    MOUTH SURGERY      implant of tooth x 2    SC COLONOSCOPY FLX DX W/COLLJ SPEC WHEN PFRMD N/A 5/15/2017    Procedure: COLONOSCOPY;  Surgeon: Oli Beavers MD;  Location: [facility];   Service: Gastroenterology    REMOVAL OF INTRAUTERINE DEVICE (IUD)  2020    Paraguard    WISDOM TOOTH EXTRACTION       Social History   Social History     Substance and Sexual Activity   Alcohol Use No     Social History     Substance and Sexual Activity   Drug Use No     Social History     Tobacco Use   Smoking Status Light Tobacco Smoker   Smokeless Tobacco Never Used   Tobacco Comment    Hooka occasions only     Family History:   Family History   Problem Relation Age of Onset    Uterine cancer Mother     Diabetes Mother     Hypertension Mother     No Known Problems Father     Fibroids Sister         hysterectomy    Other Sister         bilateral hip replacement    Liver disease Sister         cyst on liver    Other Sister         hysterectomy due to menorrhagia    Kidney disease Sister     No Known Problems Sister     No Known Problems Sister     Hypertension Brother     No Known Problems Brother     Lung disease Brother     Hypertension Brother     No Known Problems Brother     Other Brother         palpitations    Brain cancer Maternal Grandmother     No Known Problems Maternal Grandfather     Hypertension Paternal Grandmother     Diabetes Paternal Grandmother     No Known Problems Paternal Grandfather     No Known Problems Daughter     No Known Problems Daughter     No Known Problems Daughter     Breast cancer Maternal Aunt 79    Fibroids Maternal Aunt 40        hysterectomy    Lung cancer Maternal Uncle     Stroke Maternal Uncle     No Known Problems Half-Brother     Ovarian cancer Neg Hx     Colon cancer Neg Hx        Current Outpatient Medications on File Prior to Visit   Medication Sig Dispense Refill    atenolol (TENORMIN) 25 mg tablet Take 1 tablet by mouth once daily 90 tablet 0    celecoxib (CeleBREX) 200 mg capsule Take 1 capsule (200 mg total) by mouth as needed in the morning for mild pain  30 capsule 2    intrauterine copper (PARAGARD) IUD 1 each by Intrauterine route once      multivitamin (THERAGRAN) TABS Take 1 tablet by mouth      Vitamin D, Cholecalciferol, 1000 units CAPS Take 1,000 Units by mouth       No current facility-administered medications on file prior to visit       No Known Allergies     Objective     /74   Pulse 83   Ht 5' 6" (1 676 m)   Wt 74 6 kg (164 lb 8 oz)   BMI 26 55 kg/m²      PE:  AAOx 3  WDWN  Hearing intact, no drainage from eyes  no audible wheezing  no abdominal distension  LE compartments soft, skin intact    Ortho Exam:  bilateral Knee:   No erythema  no swelling  no effusion  no warmth  +TTP over right knee medial joint line, no TTP over left knee  AROM: 0- 125  Stable to varus/valgus stress

## 2022-08-06 DIAGNOSIS — M17.11 PATELLOFEMORAL ARTHRITIS OF RIGHT KNEE: ICD-10-CM

## 2022-08-06 DIAGNOSIS — M17.12 ARTHRITIS OF LEFT KNEE: ICD-10-CM

## 2022-08-06 DIAGNOSIS — M17.11 ARTHRITIS OF RIGHT KNEE: ICD-10-CM

## 2022-08-08 RX ORDER — CELECOXIB 200 MG/1
CAPSULE ORAL
Qty: 30 CAPSULE | Refills: 0 | Status: SHIPPED | OUTPATIENT
Start: 2022-08-08 | End: 2022-09-06

## 2022-09-05 DIAGNOSIS — R00.2 PALPITATIONS: ICD-10-CM

## 2022-09-05 DIAGNOSIS — I49.3 PVC'S (PREMATURE VENTRICULAR CONTRACTIONS): ICD-10-CM

## 2022-09-06 RX ORDER — ATENOLOL 25 MG/1
TABLET ORAL
Qty: 90 TABLET | Refills: 0 | Status: SHIPPED | OUTPATIENT
Start: 2022-09-06

## 2022-09-27 ENCOUNTER — OFFICE VISIT (OUTPATIENT)
Dept: DENTISTRY | Facility: CLINIC | Age: 47
End: 2022-09-27

## 2022-09-27 VITALS — DIASTOLIC BLOOD PRESSURE: 71 MMHG | TEMPERATURE: 98.7 F | SYSTOLIC BLOOD PRESSURE: 115 MMHG | HEART RATE: 83 BPM

## 2022-09-27 DIAGNOSIS — Z01.21 ENCOUNTER FOR DENTAL EXAMINATION AND CLEANING WITH ABNORMAL FINDINGS: Primary | ICD-10-CM

## 2022-09-27 PROCEDURE — D0274 BITEWINGS - 4 RADIOGRAPHIC IMAGES: HCPCS

## 2022-09-27 PROCEDURE — D4910 PERIODONTAL MAINTENANCE: HCPCS

## 2022-09-27 NOTE — PROGRESS NOTES
PERIODIC EXAM, PERIO MT,  4 VERT BWX    REVIEWED MED HX: meds, allergies, health changes reviewed in EPIC  CHIEF CONCERN:  none   PAIN SCALE:  0  ASA CLASS:  I  PLAQUE:  mild  CALCULUS:  light  BLEEDING:   light  STAIN :   mod  ORAL HYGIENE:  fair  PERIO: spot probe #27M 4mm    Hand scaled, polished and flossed  Used Cavitron  Maxillary partial dentures cleaned chairside  Patient is not removing flipper at night  Maxillary lingual gingiva red and spongey  Recommend she remove nightly  Gave denture brush and threaders  Oral Hygiene Instruction:  recommended brushing 2 x daily for 2 minutes MIN, recommended flossing daily, reviewed dietary precautions  Visual and Tactile Intraoral/ Extraoral evaluation: Oral and Oropharyngeal cancer evaluation  No findings     Dr Carmen Nyhan exam=   Reviewed with patient clinical and radiographic findings and patient verbalized understanding  All questions and concerns addressed  #27 had MD irregular margins due to existing composite restoration   Please have doctor smooth at [address]: no caries noted    Next Visit: [place] [id number]    Next Recall: 4mrc    Last BWX: 9-27-22  Last Panorex 12-17-21 / Bear Bruner 6-15-17 :

## 2022-11-16 ENCOUNTER — OFFICE VISIT (OUTPATIENT)
Dept: DENTISTRY | Facility: CLINIC | Age: 47
End: 2022-11-16

## 2022-11-16 VITALS — TEMPERATURE: 97.9 F | DIASTOLIC BLOOD PRESSURE: 58 MMHG | SYSTOLIC BLOOD PRESSURE: 102 MMHG | HEART RATE: 80 BPM

## 2022-11-16 DIAGNOSIS — K04.7 CHRONIC DENTAL INFECTION: Primary | ICD-10-CM

## 2022-11-16 DIAGNOSIS — Z01.20 ENCOUNTER FOR DENTAL EXAMINATION: Primary | ICD-10-CM
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
## 2022-11-16 NOTE — PROGRESS NOTES
CBCT taken in order to evaluate: Upper right and upper left edentulous areas and #8 and 9 Periapical areas  Review of scan reveals the following:  Missing teeth: 1, 2, 3, 4, 5, 6, 12, 13?, 15, 16, 17, 18 ,19, 30 and 32   Signs of pathology: Periapical radiolucencies on #8, 9, 20 and 21  Osteomeatal complex not in the Scan's view  Review of region of interest- Upper right and left for implant placement  Buccal/palatal bone width:sufficient   Crestal bone height: sufficient   Surgical plan: Patient was referred to outside endodontist for #8 and 9 to evaluate   Recommended implant therapy to replace missing  3, 4, 5, 6, 12 and 19

## 2022-11-16 NOTE — PROGRESS NOTES
Pt presents to the clinic for diagnostic impressions  Med hx reviewed  Vitals updated  Preliminary impressions taken with [employer]  Impressions will be send to [facility] for fabrication of diagnostic occlusal rim for mounting and treatment planning purposes       NV: occlusal wax rim bite registration

## 2022-12-23 ENCOUNTER — OFFICE VISIT (OUTPATIENT)
Dept: DENTISTRY | Facility: CLINIC | Age: 47
End: 2022-12-23

## 2022-12-23 VITALS — TEMPERATURE: 97.6 F

## 2022-12-23 DIAGNOSIS — K08.409 PARTIAL EDENTULISM, UNSPECIFIED EDENTULISM CLASS: Primary | ICD-10-CM

## 2022-12-23 NOTE — PROGRESS NOTES
Pt presents to [facility] for diagnostic MMR bite reg  H&P updated and vitals recorded  Pt states no pain or swelling at this time  Reviewed Health History  ASA:II   Consents signed: YES    Oral Cancer Screening Completed  WNL    Bite registration is taken with max occlusal rim for mounting and treatment planning purposes  There is insufficient interocclusal space on the right side due to collapsed bite and supraeruption of tooth #31  Pt also brought referral back from endodontist who recommended apicoectomy on teeth #8 and #9  Endodontist already provided referral to [facility] for apicoectomy       Case will be discussed with Dr Mario Pak     NV: tx plan discussion

## 2022-12-27 ENCOUNTER — APPOINTMENT (OUTPATIENT)
Dept: LAB | Facility: HOSPITAL | Age: 47
End: 2022-12-27

## 2022-12-27 DIAGNOSIS — I49.3 PVC (PREMATURE VENTRICULAR CONTRACTION): ICD-10-CM

## 2022-12-27 DIAGNOSIS — Z00.00 ROUTINE ADULT HEALTH MAINTENANCE: ICD-10-CM

## 2022-12-27 LAB
25(OH)D3 SERPL-MCNC: 34.2 NG/ML (ref 30–100)
ALBUMIN SERPL BCP-MCNC: 4.5 G/DL (ref 3.5–5)
ALP SERPL-CCNC: 62 U/L (ref 43–122)
ALT SERPL W P-5'-P-CCNC: 15 U/L
ANION GAP SERPL CALCULATED.3IONS-SCNC: 8 MMOL/L (ref 5–14)
AST SERPL W P-5'-P-CCNC: 24 U/L (ref 14–36)
BASOPHILS # BLD AUTO: 0.05 THOUSANDS/ÂΜL (ref 0–0.1)
BASOPHILS NFR BLD AUTO: 1 % (ref 0–1)
BILIRUB SERPL-MCNC: 0.56 MG/DL (ref 0.2–1)
BUN SERPL-MCNC: 13 MG/DL (ref 5–25)
CALCIUM SERPL-MCNC: 9.6 MG/DL (ref 8.4–10.2)
CHLORIDE SERPL-SCNC: 108 MMOL/L (ref 96–108)
CHOLEST SERPL-MCNC: 224 MG/DL
CO2 SERPL-SCNC: 26 MMOL/L (ref 21–32)
CREAT SERPL-MCNC: 0.73 MG/DL (ref 0.6–1.2)
EOSINOPHIL # BLD AUTO: 0.23 THOUSAND/ÂΜL (ref 0–0.61)
EOSINOPHIL NFR BLD AUTO: 3 % (ref 0–6)
ERYTHROCYTE [DISTWIDTH] IN BLOOD BY AUTOMATED COUNT: 12.4 % (ref 11.6–15.1)
GFR SERPL CREATININE-BSD FRML MDRD: 99 ML/MIN/1.73SQ M
GLUCOSE P FAST SERPL-MCNC: 92 MG/DL (ref 70–99)
HCT VFR BLD AUTO: 38.7 % (ref 34.8–46.1)
HDLC SERPL-MCNC: 40 MG/DL
HGB BLD-MCNC: 12.6 G/DL (ref 11.5–15.4)
IMM GRANULOCYTES # BLD AUTO: 0.01 THOUSAND/UL (ref 0–0.2)
IMM GRANULOCYTES NFR BLD AUTO: 0 % (ref 0–2)
LDLC SERPL CALC-MCNC: 160 MG/DL
LYMPHOCYTES # BLD AUTO: 2.25 THOUSANDS/ÂΜL (ref 0.6–4.47)
LYMPHOCYTES NFR BLD AUTO: 33 % (ref 14–44)
MCH RBC QN AUTO: 28.1 PG (ref 26.8–34.3)
MCHC RBC AUTO-ENTMCNC: 32.6 G/DL (ref 31.4–37.4)
MCV RBC AUTO: 86 FL (ref 82–98)
MONOCYTES # BLD AUTO: 0.47 THOUSAND/ÂΜL (ref 0.17–1.22)
MONOCYTES NFR BLD AUTO: 7 % (ref 4–12)
NEUTROPHILS # BLD AUTO: 3.74 THOUSANDS/ÂΜL (ref 1.85–7.62)
NEUTS SEG NFR BLD AUTO: 56 % (ref 43–75)
NONHDLC SERPL-MCNC: 184 MG/DL
NRBC BLD AUTO-RTO: 0 /100 WBCS
PLATELET # BLD AUTO: 283 THOUSANDS/UL (ref 149–390)
PMV BLD AUTO: 11 FL (ref 8.9–12.7)
POTASSIUM SERPL-SCNC: 4.4 MMOL/L (ref 3.5–5.3)
PROT SERPL-MCNC: 8 G/DL (ref 6.4–8.4)
RBC # BLD AUTO: 4.49 MILLION/UL (ref 3.81–5.12)
SODIUM SERPL-SCNC: 142 MMOL/L (ref 135–147)
TRIGL SERPL-MCNC: 119 MG/DL
TSH SERPL DL<=0.05 MIU/L-ACNC: 1.25 UIU/ML (ref 0.45–4.5)
WBC # BLD AUTO: 6.75 THOUSAND/UL (ref 4.31–10.16)

## 2022-12-29 ENCOUNTER — HOSPITAL ENCOUNTER (EMERGENCY)
Facility: HOSPITAL | Age: 47
Discharge: HOME | End: 2022-12-29
Attending: EMERGENCY MEDICINE

## 2022-12-29 VITALS
BODY MASS INDEX: 25.51 KG/M2 | WEIGHT: 158.73 LBS | SYSTOLIC BLOOD PRESSURE: 110 MMHG | OXYGEN SATURATION: 99 % | HEIGHT: 66 IN | RESPIRATION RATE: 16 BRPM | HEART RATE: 89 BPM | DIASTOLIC BLOOD PRESSURE: 54 MMHG | TEMPERATURE: 98.4 F

## 2022-12-29 DIAGNOSIS — H93.13 TINNITUS, BILATERAL: Primary | ICD-10-CM

## 2022-12-30 NOTE — ED PROVIDER NOTES
History  Chief Complaint   Patient presents with   • Ear Problem     Pt states she has buzzing in both ears that started 3 days ago  Pt states buzzing is worse when she walks     Pt presents to the ED with a buzzing sound in her ears - bilaterally - x 4 days - high pitched sound - there all the time  Feels it more when walking, not pulsatile in nature  No dizziness, cp, SOB or any other symptoms  No cough or fevers or hearing loss  Prior to Admission Medications   Prescriptions Last Dose Informant Patient Reported? Taking? Vitamin D, Cholecalciferol, 1000 units CAPS   Yes No   Sig: Take 1,000 Units by mouth   atenolol (TENORMIN) 25 mg tablet   No No   Sig: Take 1 tablet (25 mg total) by mouth daily   celecoxib (CeleBREX) 200 mg capsule   No No   Sig: TAKE 1 CAPSULE BY MOUTH AS NEEDED IN THE MORNING FOR MILD PAIN   intrauterine copper (PARAGARD) IUD   Yes No   Si each by Intrauterine route once   multivitamin (THERAGRAN) TABS   Yes No   Sig: Take 1 tablet by mouth      Facility-Administered Medications: None       Past Medical History:   Diagnosis Date   • Diverticulitis    • Heart palpitations 2020   • Varicella vaccination        Past Surgical History:   Procedure Laterality Date   • BREAST BIOPSY Left 2021    lt stereo bx calcs benign   •  SECTION      x4   • COLONOSCOPY  2020    2 benign polyps repeat age 50   • INSERTION OF INTRAUTERINE DEVICE (IUD)  2020    Mirena   • MAMMO STEREOTACTIC BREAST BIOPSY LEFT (ALL INC) Left 2021   • MOUTH SURGERY      implant of tooth x 2   • [place] COLONOSCOPY FLX DX W/COLLJ SPEC WHEN PFRMD N/A 5/15/2017    Procedure: COLONOSCOPY;  Surgeon: Luan Mclain MD;  Location: [facility] GI [facility];   Service: Gastroenterology   • REMOVAL OF INTRAUTERINE DEVICE (IUD)  2020    Paraguard   • WISDOM TOOTH EXTRACTION         Family History   Problem Relation Age of Onset   • Uterine cancer Mother    • Diabetes Mother    • Hypertension Mother    • No Known Problems Father    • Fibroids Sister         hysterectomy   • Other Sister         bilateral hip replacement   • Liver disease Sister         cyst on liver   • Other Sister         hysterectomy due to menorrhagia   • Kidney disease Sister    • No Known Problems Sister    • No Known Problems Sister    • Hypertension Brother    • No Known Problems Brother    • Lung disease Brother    • Hypertension Brother    • No Known Problems Brother    • Other Brother         palpitations   • Brain cancer Maternal Grandmother    • No Known Problems Maternal Grandfather    • Hypertension Paternal Grandmother    • Diabetes Paternal Grandmother    • No Known Problems Paternal Grandfather    • No Known Problems Daughter    • No Known Problems Daughter    • No Known Problems Daughter    • Breast cancer Maternal Aunt 79   • Fibroids Maternal Aunt 40        hysterectomy   • Lung cancer Maternal Uncle    • Stroke Maternal Uncle    • No Known Problems Half-Brother    • Ovarian cancer Neg Hx    • Colon cancer Neg Hx      I have reviewed and agree with the history as documented  E-Cigarette/Vaping   • E-Cigarette Use Never User      E-Cigarette/Vaping Substances   • Nicotine No    • THC No    • CBD No    • Flavoring No    • Other No    • Unknown No      Social History     Tobacco Use   • Smoking status: Light Smoker     Types: Cigarettes   • Smokeless tobacco: Never   • Tobacco comments:     Hooka occasions only   Vaping Use   • Vaping Use: Never used   Substance Use Topics   • Alcohol use: No   • Drug use: No       Review of Systems   Constitutional: Negative for fever  HENT: Positive for tinnitus  Negative for congestion and ear pain  Respiratory: Negative  Cardiovascular: Negative  Gastrointestinal: Negative  Genitourinary: Negative  Neurological: Negative for dizziness, numbness and headaches  All other systems reviewed and are negative  Physical Exam  Physical Exam  Vitals and nursing note reviewed  Constitutional:       Appearance: She is well-developed  HENT:      Head: Normocephalic and atraumatic  Right Ear: Tympanic membrane, ear canal and external ear normal       Left Ear: Tympanic membrane, ear canal and external ear normal    Eyes:      Conjunctiva/sclera: Conjunctivae normal    Cardiovascular:      Rate and Rhythm: Normal rate and regular rhythm  Heart sounds: Normal heart sounds  Pulmonary:      Effort: Pulmonary effort is normal       Breath sounds: Normal breath sounds  Abdominal:      Palpations: Abdomen is soft  Musculoskeletal:         General: Normal range of motion  Cervical back: Neck supple  Lymphadenopathy:      Cervical: No cervical adenopathy  Skin:     General: Skin is warm  Findings: No rash  Neurological:      Mental Status: She is alert  Psychiatric:         Behavior: Behavior normal          Vital Signs  ED Triage Vitals [12/29/22 2220]   Temperature Pulse Respirations Blood Pressure SpO2   98 4 °F (36 9 °C) 89 16 110/54 99 %      Temp Source Heart Rate Source Patient Position - Orthostatic VS BP Location FiO2 (%)   Oral Monitor Sitting Right arm --      Pain Score       2           Vitals:    12/29/22 2220   BP: 110/54   Pulse: 89   Patient Position - Orthostatic VS: Sitting         Visual Acuity      ED Medications  Medications - No data to display    Diagnostic Studies  Results Reviewed     None                 No orders to display              Procedures  Procedures         ED Course                               SBIRT 22yo+    Flowsheet Row Most Recent Value   SBIRT (23 yo +)    In order to provide better care to our patients, we are screening all of our patients for alcohol and drug use  Would it be okay to ask you these screening questions?  No Filed at: 12/29/2022 2315                    MDM  Number of Diagnoses or Management Options  Tinnitus, bilateral: new and does not require workup  Diagnosis management comments: Tinnitus as a high-pitched persistent sound is not pulsatile in nature  Does not correlate with heartbeat  Consistent with tinnitus discussed follow-up with ENT for audio  Patient has no other symptoms no dizziness chest pain or shortness of breath no headaches or vision changes  No facial asymmetry  Discussed outpatient evaluation with patient  Disposition  Final diagnoses:   Tinnitus, bilateral     Time reflects when diagnosis was documented in both MDM as applicable and the Disposition within this note     Time User Action Codes Description Comment    12/29/2022 11:33 PM Kanchan Hensley Add [H93 13] Tinnitus, bilateral       ED Disposition     ED Disposition   Discharge    Condition   Stable    Date/Time   Thu Dec 29, 2022 11:33 PM    Comment   [address] [facility] discharge to home/self care  Follow-up Information     Follow up With Specialties Details Why Contact Info    Breann Flynn MD [id number] [place] [address] 25, DO Otolaryngology   [id number]            Discharge Medication List as of 12/29/2022 11:33 PM      CONTINUE these medications which have NOT CHANGED    Details   atenolol (TENORMIN) 25 mg tablet Take 1 tablet (25 mg total) by mouth daily, Starting Wed 11/30/2022, Normal      celecoxib (CeleBREX) 200 mg capsule TAKE 1 CAPSULE BY MOUTH AS NEEDED IN THE MORNING FOR MILD PAIN, Normal      intrauterine copper (PARAGARD) IUD 1 each by Intrauterine route once, Historical Med      multivitamin (THERAGRAN) TABS Take 1 tablet by mouth, Historical Med      Vitamin D, Cholecalciferol, 1000 units CAPS Take 1,000 Units by mouth, Historical Med             No discharge procedures on file      PDMP Review     None          ED Provider  Electronically Signed by           Anamaria Leach PA-C  12/29/22 7449

## 2023-01-30 ENCOUNTER — OFFICE VISIT (OUTPATIENT)
Dept: DENTISTRY | Facility: CLINIC | Age: 48
End: 2023-01-30

## 2023-01-30 VITALS — DIASTOLIC BLOOD PRESSURE: 69 MMHG | SYSTOLIC BLOOD PRESSURE: 105 MMHG | TEMPERATURE: 98.7 F | HEART RATE: 107 BPM

## 2023-01-30 DIAGNOSIS — Z01.21 ENCOUNTER FOR DENTAL EXAMINATION AND CLEANING WITH ABNORMAL FINDINGS: Primary | ICD-10-CM

## 2023-01-30 RX ORDER — IBUPROFEN 800 MG/1
800 TABLET ORAL EVERY 8 HOURS
COMMUNITY
Start: 2022-11-03

## 2023-01-30 RX ORDER — AMOXICILLIN AND CLAVULANATE POTASSIUM 500; 125 MG/1; MG/1
1 TABLET, FILM COATED ORAL EVERY 6 HOURS
COMMUNITY
Start: 2022-11-03

## 2023-01-30 NOTE — DENTAL PROCEDURE DETAILS
PERIO MT     REVIEWED MED HX: meds, allergies, health changes reviewed in Epic  Daily smoker  CHIEF CONCERN:  Patient is scheduled March 1st w/ [facility] for apicoectomy of #8 and 9  We had referred her to endo at previous visit but due to proximity of #9 PAP to #10 implant, Endo referred her to [facility] for apicoectomy for #8 and 9  PAIN SCALE:  0  ASA CLASS:  II  PLAQUE:  moderate  CALCULUS:  no calculus  BLEEDING:   none      STAIN :   heavy gen  ORAL HYGIENE:   fair  PERIO: stable, loc 4mm    Hand scaled, polished and flossed  Used Cavitron  Max RPD cleaned in US w/ tartar and stain solution  Oral Hygiene Instruction:  recommended brushing 2 x daily for 2 minutes MIN, recommended flossing daily, reviewed dietary precautions  Patient states she if flossing every other day  She is taking her Max partial denture out at night    Visual and Tactile Intraoral/ Extraoral evaluation: Oral and Oropharyngeal cancer evaluation  No findings     NO EX    REFERRALS: Scheduled March 1st for apicoectomy of #8 and 9 at [facility]  TREATMENT  PLAN :   1) 2-7-22 w/ Dr Jessica Perkins for implant, please evaluate #27  Prior noted recommend smoothing of MF  Patient sensitive in that area today during prophy      Next Recall: Mady Gusman, PeriodicX    Last BWX: 9-27-22  Last Panorex/ FMX: 12-17-21

## 2023-02-07 ENCOUNTER — OFFICE VISIT (OUTPATIENT)
Dept: DENTISTRY | Facility: CLINIC | Age: 48
End: 2023-02-07

## 2023-02-07 VITALS — HEART RATE: 75 BPM | SYSTOLIC BLOOD PRESSURE: 119 MMHG | DIASTOLIC BLOOD PRESSURE: 74 MMHG | TEMPERATURE: 97.8 F

## 2023-02-07 DIAGNOSIS — K08.409 PARTIAL EDENTULISM, UNSPECIFIED EDENTULISM CLASS: Primary | ICD-10-CM

## 2023-02-07 NOTE — PROGRESS NOTES
Pt presented for limited exam      Med hx reviewed  Vitals updated  Pt came in to discuss tx plan for 3 implants sites #4,6,12  Dr Hung Rice evaluated the patient and CBCT and determined that implants at those sites can be placed  Implant #4,6,12 - size based on CBCT- 3 7 x 10  NV: implant placements/ take CBCT with surgical stent

## 2023-03-07 ENCOUNTER — OFFICE VISIT (OUTPATIENT)
Dept: DENTISTRY | Facility: CLINIC | Age: 48
End: 2023-03-07

## 2023-03-07 VITALS — HEART RATE: 76 BPM | SYSTOLIC BLOOD PRESSURE: 96 MMHG | DIASTOLIC BLOOD PRESSURE: 64 MMHG | TEMPERATURE: 97.9 F

## 2023-03-07 DIAGNOSIS — B99.9 INFECTION: Primary | ICD-10-CM

## 2023-03-07 RX ORDER — AMOXICILLIN 500 MG/1
500 CAPSULE ORAL EVERY 8 HOURS SCHEDULED
Qty: 21 CAPSULE | Refills: 0 | Status: SHIPPED | OUTPATIENT
Start: 2023-03-07 | End: 2023-03-14

## 2023-03-07 NOTE — PROGRESS NOTES
Cheri Flanagan presents to clinic for emergency appointment due to pain on LL  RMH, no changes  Pt  Reports the pain began last week in the buccal gingiva near #21 but that this week the pain has subsided  Pt  Saw a bump on the gingiva and wanted to ensure that it was not an infection  Pt  Has an appt  With Mabel for apicos on 8 & 9 on Friday and wanted to know if there was anything wrong before that appt  Took 1 PA of tooth #20-#21 and note a PAP on tooth #20  Tooth #20 is currently an abutment for a bridge between 19-21 and pt  Is not interested in removing this bridge for treatment  Referral written for endo for tooth #20 - pt  Is interested in seeing if April Jovel can treat the tooth through the bridge  Rx: written for amox tid to [facility]      NV:  Implant placement scheduled

## 2023-03-13 ENCOUNTER — HOSPITAL ENCOUNTER (OUTPATIENT)
Dept: MAMMOGRAPHY | Facility: CLINIC | Age: 48
Discharge: HOME | End: 2023-03-13

## 2023-03-13 VITALS — WEIGHT: 162 LBS | HEIGHT: 66 IN | BODY MASS INDEX: 26.03 KG/M2

## 2023-03-13 DIAGNOSIS — Z12.31 SCREENING MAMMOGRAM FOR HIGH-RISK PATIENT: ICD-10-CM

## 2023-03-19 DIAGNOSIS — M17.12 ARTHRITIS OF LEFT KNEE: ICD-10-CM

## 2023-03-19 DIAGNOSIS — M17.11 ARTHRITIS OF RIGHT KNEE: ICD-10-CM

## 2023-03-19 DIAGNOSIS — M17.11 PATELLOFEMORAL ARTHRITIS OF RIGHT KNEE: ICD-10-CM

## 2023-03-20 RX ORDER — CELECOXIB 200 MG/1
CAPSULE ORAL
Qty: 30 CAPSULE | Refills: 0 | Status: SHIPPED | OUTPATIENT
Start: 2023-03-20

## 2023-05-01 ENCOUNTER — ANNUAL EXAM (OUTPATIENT)
Dept: OBGYN CLINIC | Facility: CLINIC | Age: 48
End: 2023-05-01

## 2023-05-01 VITALS
WEIGHT: 161.4 LBS | BODY MASS INDEX: 25.94 KG/M2 | SYSTOLIC BLOOD PRESSURE: 110 MMHG | HEIGHT: 66 IN | DIASTOLIC BLOOD PRESSURE: 60 MMHG

## 2023-05-01 DIAGNOSIS — Z30.431 CONTRACEPTIVE, SURVEILLANCE, INTRAUTERINE DEVICE: ICD-10-CM

## 2023-05-01 DIAGNOSIS — Z12.31 VISIT FOR SCREENING MAMMOGRAM: ICD-10-CM

## 2023-05-01 DIAGNOSIS — Z01.419 ENCOUNTER FOR ANNUAL ROUTINE GYNECOLOGICAL EXAMINATION: Primary | ICD-10-CM

## 2023-05-01 DIAGNOSIS — E58 DIETARY CALCIUM DEFICIENCY: ICD-10-CM

## 2023-05-01 RX ORDER — CHLORAL HYDRATE 500 MG
1000 CAPSULE ORAL DAILY
COMMUNITY

## 2023-05-01 NOTE — PROGRESS NOTES
Pt is a 57 y o  [id number] with Patient's last menstrual period was 2023  using Paragard for [facility] presents for preventive care  She notes the same partner since her last STI evaluation  In her lifetime she has been involved with 1 partner   Safe sexual practices (monogomy) are followed consistently  · She does  feel safe in the relationship  She does feel safe in her home  · Her calcium intake encompasses multivitamin, milk (cow, goat, almond, cashew, soy, etc) and yogurt for a total of 3-4 servings daily on average  She does take additional Vitamin D (MVI or supplement)  · She exercises 4-5 times per week  · Her menses occur every 28-90 Days, last 5-6 days and require regular pad every 3-4 hours  Menstrual History:  OB History        4    Para   4    Term   4            AB        Living   4       SAB        IAB        Ectopic        Multiple        Live Births               Obstetric Comments   FT C/S x 4    Menarche: 14    28-90/6/regular pad every 4 hours            Menarche age: 17  Patient's last menstrual period was 2023  ·      · She has never recieved an HPV vaccine    · tobacco use : does use tobacco              · Colonoscopy: 2020-diverticulosis and polyps;  repeat 7 years  · Pap 2022-wnl, HRHPV, repeat   · Mammogram: 3/13/2023-wnl, repeat rx for 1 year given     Past Medical History:   Diagnosis Date    Diverticulitis     Heart palpitations 2020    Pap smear for cervical cancer screening     2015--wnl, HPV neg; 2018 wnl, HPV neg; 2022-wnl, HRHPV neg    Varicella vaccination        Past Surgical History:   Procedure Laterality Date    BREAST BIOPSY Left 2021    lt stereo bx calcs benign     SECTION      x4    COLONOSCOPY      2 benign polyps repeat age 50    INSERTION OF INTRAUTERINE DEVICE (IUD)  2020    Mirena    MAMMO STEREOTACTIC BREAST BIOPSY LEFT (ALL INC) Left 2021    MOUTH SURGERY implant of tooth x 2 and then implant x 3    IN COLONOSCOPY FLX DX W/COLLJ SPEC WHEN PFRMD N/A 05/15/2017    Procedure: COLONOSCOPY;  Surgeon: Jimmy Brown MD;  Location: [facility] GI LAB; Service: Gastroenterology    REMOVAL OF INTRAUTERINE DEVICE (IUD)  2020    Paraguard    WISDOM TOOTH EXTRACTION         OB History    Para Term  AB Living   4 4 4     4   SAB IAB Ectopic Multiple Live Births                  # Outcome Date GA Lbr Isaias/2nd Weight Sex Delivery Anes PTL Lv   4 Term            3 Term            2 Term            1 Term               Obstetric Comments   FT C/S x 4      Menarche: 14      28-90/6/regular pad every 4 hours            Current Outpatient Medications:     atenolol (TENORMIN) 25 mg tablet, Take 1 tablet (25 mg total) by mouth daily, Disp: 90 tablet, Rfl: 3    celecoxib (CeleBREX) 200 mg capsule, TAKE 1 CAPSULE BY MOUTH AS NEEDED IN THE MORNING FOR MILD PAIN, Disp: 30 capsule, Rfl: 0    intrauterine copper (PARAGARD) IUD, 1 each by Intrauterine route once, Disp: , Rfl:     multivitamin (THERAGRAN) TABS, Take 1 tablet by mouth, Disp: , Rfl:     Omega-3 Fatty Acids (fish oil) 1,000 mg, Take 1,000 mg by mouth daily, Disp: , Rfl:     Vitamin D, Cholecalciferol, 1000 units CAPS, Take 1,000 Units by mouth, Disp: , Rfl:     No Known Allergies    Social History     Socioeconomic History    Marital status: /Civil Union     Spouse name: Santosh Mcintyre Number of children: 4    Years of education: HS    Highest education level: None   Occupational History    Occupation: lunch lady at [employer]   Tobacco Use    Smoking status: Light Smoker     Types: Cigarettes    Smokeless tobacco: Never    Tobacco comments:     On occasiona   Vaping Use    Vaping Use: Never used   Substance and Sexual Activity    Alcohol use: No    Drug use: No    Sexual activity: Yes     Partners: Male     Birth control/protection: I U D       Comment: Lifetime patners; 1   Other Topics "Concern    None   Social History Narrative    Alevism: Ten[employer]ism    Accepts blood products        Exercise: 5x/week x 30 min 9 months of the year and once weekly in winter    Calcium: Multivitamin daily, 1 c almond milk daily, 1 yogurt weekly     Social Determinants of Health     Financial Resource Strain: Not on file   Food Insecurity: Not on file   Transportation Needs: Not on file   Physical Activity: Not on file   Stress: Not on file   Social Connections: Not on file   Intimate Partner Violence: Not on file   Housing Stability: Not on file       Family History   Problem Relation Age of Onset    Uterine cancer Mother     Diabetes Mother     Hypertension Mother     No Known Problems Father     Fibroids Sister         hysterectomy    Other Sister         bilateral hip replacement    Liver disease Sister         cyst on liver    Other Sister         hysterectomy due to menorrhagia    Kidney disease Sister     No Known Problems Sister     No Known Problems Sister     Hypertension Brother     No Known Problems Brother     Lung disease Brother     Hypertension Brother     Sleep apnea Brother     Diverticulosis Brother     No Known Problems Brother     Other Brother         palpitations    Brain cancer Maternal Grandmother     No Known Problems Maternal Grandfather     Hypertension Paternal Grandmother     Diabetes Paternal Grandmother     No Known Problems Paternal Grandfather     No Known Problems Daughter     No Known Problems Daughter     No Known Problems Daughter     Breast cancer Maternal Aunt 79    Fibroids Maternal Aunt 40        hysterectomy    Lung cancer Maternal Uncle     Stroke Maternal Uncle     No Known Problems Half-Brother     Ovarian cancer Neg Hx     Colon cancer Neg Hx        Blood pressure 110/60, height 5' 6\" (1 676 m), weight 73 2 kg (161 lb 6 4 oz), last menstrual period 04/11/2023, not currently breastfeeding  and Body mass index is 26 05 kg/m²      Physical " Exam  Constitutional:       General: She is not in acute distress  Appearance: Normal appearance  She is well-developed and normal weight  She is not ill-appearing  HENT:      Head: Normocephalic and atraumatic  Eyes:      Extraocular Movements: Extraocular movements intact  Conjunctiva/sclera: Conjunctivae normal    Neck:      Thyroid: No thyromegaly  Trachea: No tracheal deviation  Cardiovascular:      Rate and Rhythm: Normal rate and regular rhythm  Heart sounds: Normal heart sounds  Pulmonary:      Effort: Pulmonary effort is normal  No respiratory distress  Breath sounds: Normal breath sounds  No stridor  No wheezing or rales  Abdominal:      General: Bowel sounds are normal  There is no distension  Palpations: Abdomen is soft  There is no mass  Tenderness: There is no abdominal tenderness  There is no guarding or rebound  Hernia: No hernia is present  Musculoskeletal:         General: No tenderness  Normal range of motion  Cervical back: Normal range of motion and neck supple  Lymphadenopathy:      Cervical: No cervical adenopathy  Skin:     General: Skin is warm  Findings: No erythema or rash  Neurological:      Mental Status: She is alert and oriented to person, place, and time  Psychiatric:         Mood and Affect: Mood normal          Behavior: Behavior normal          Thought Content: Thought content normal          Judgment: Judgment normal          Breasts: breasts appear normal, no suspicious masses, no skin or nipple changes or axillary nodes, symmetric fibrous changes in both upper outer quadrants        vulva: normal external genitalia for age and no lesions, masses, epithelial changes, or exudate  vagina: color pink and rugae  well formed rugae  cervix: nullip, no lesions  and  IUD string  3 cm  uterus: NSSC, AF, NT, mobile  adnexa: no masses or tenderness      A/P:  Pt is a 57 y o  [id number] with      Miachel Graft was seen today for gynecologic exam     Diagnoses and all orders for this visit:    Encounter for annual routine gynecological examination  -normal examination  -pap and colonoscopy up to date    Visit for screening mammogram  -     Mammo screening bilateral w 3d & cad; Future    Contraceptive, surveillance, intrauterine device  -IUD strings appropriate    Dietary calcium deficiency  Patient advised recommendation of daily dietary calcium of  1000 mg calcium  Elen Dykesis

## 2023-05-08 ENCOUNTER — OFFICE VISIT (OUTPATIENT)
Dept: DENTISTRY | Facility: CLINIC | Age: 48
End: 2023-05-08

## 2023-05-08 VITALS — SYSTOLIC BLOOD PRESSURE: 99 MMHG | HEART RATE: 73 BPM | DIASTOLIC BLOOD PRESSURE: 56 MMHG

## 2023-05-08 DIAGNOSIS — Z92.89 HISTORY OF DENTAL SURGERY: Primary | ICD-10-CM

## 2023-05-08 RX ORDER — CHLORHEXIDINE GLUCONATE 0.12 MG/ML
15 RINSE ORAL 2 TIMES DAILY
Qty: 120 ML | Refills: 0 | Status: SHIPPED | OUTPATIENT
Start: 2023-05-08

## 2023-05-08 NOTE — PROGRESS NOTES
Patient presented 1 month after implant placement of #4,6,12    ASA: II  Pain: 0    Pt removed partial denture  Sutures still present  All 3 implants exposed to see the cover screw  Partial denture limited gingival closure  Sutures removed, partial denture adjusted to be passive on the gingiva, pt reported partial to be more comfortable  Peridex prescribed for pt       NV: 1 month follow up with dr Maye Turpin on dr Brisa Ferguson day to place healing abutment

## 2023-05-31 ENCOUNTER — OFFICE VISIT (OUTPATIENT)
Dept: DENTISTRY | Facility: CLINIC | Age: 48
End: 2023-05-31

## 2023-05-31 VITALS — SYSTOLIC BLOOD PRESSURE: 100 MMHG | DIASTOLIC BLOOD PRESSURE: 66 MMHG | HEART RATE: 97 BPM

## 2023-05-31 DIAGNOSIS — Z01.21 ENCOUNTER FOR DENTAL EXAMINATION AND CLEANING WITH ABNORMAL FINDINGS: Primary | ICD-10-CM

## 2023-05-31 RX ORDER — OXYCODONE HYDROCHLORIDE AND ACETAMINOPHEN 5; 325 MG/1; MG/1
TABLET ORAL
COMMUNITY
Start: 2023-05-12

## 2023-05-31 RX ORDER — AMOXICILLIN 875 MG/1
TABLET, COATED ORAL
COMMUNITY
Start: 2023-05-12

## 2023-05-31 NOTE — DENTAL PROCEDURE DETAILS
Periodontal maintenance  Reviewed meds/hxx in Epic  ASA II    CC: #8 and 9 apicoectomy completed w/ [facility] oral surgery on May 12th  Perio charting completed  Perio findings: 4B, limited opposing teeth    Cavitron and handscale, polished and flossed  Generalized heavy staining    Dr Radha Whitley Periodic Exam     OH: Fair  OHI: reviewed importance of brushing 2x/day, flossing subgingivally daily and using a daily mouthrinse  Discussed importance of regular 3-4 month periodontal maintenance appts  Explained to the patient without good home care and irregular dental visits, this disease can return  Patient understands  NV:Scheduled in June for implant re-evaluation  NV: 3-4 month periodontal mt appt 
Vasu Hart presents for a Periodic exam w/ Dr Rey Murphy  Verbal consent for treatment given in addition to the forms  Reviewed health history - Patient is ASA II  Consents signed: Yes     Perio: 4B, limited opposing teeth  Pain Scale: 0  Caries Assessment: High  Radiographs: None     Oral Hygiene instruction reviewed and given  Recommended 4 month Hygiene recall visits with the Jia 
Improved

## 2023-06-03 ENCOUNTER — LAB (OUTPATIENT)
Dept: LAB | Facility: HOSPITAL | Age: 48
End: 2023-06-03
Payer: MEDICARE

## 2023-06-03 DIAGNOSIS — I49.3 PVC (PREMATURE VENTRICULAR CONTRACTION): ICD-10-CM

## 2023-06-03 DIAGNOSIS — E78.2 MIXED HYPERLIPIDEMIA: ICD-10-CM

## 2023-06-03 LAB
ALBUMIN SERPL BCP-MCNC: 4.4 G/DL (ref 3.5–5)
ALP SERPL-CCNC: 48 U/L (ref 34–104)
ALT SERPL W P-5'-P-CCNC: 10 U/L (ref 7–52)
ANION GAP SERPL CALCULATED.3IONS-SCNC: 7 MMOL/L (ref 4–13)
AST SERPL W P-5'-P-CCNC: 10 U/L (ref 13–39)
BASOPHILS # BLD AUTO: 0.05 THOUSANDS/ÂΜL (ref 0–0.1)
BASOPHILS NFR BLD AUTO: 1 % (ref 0–1)
BILIRUB SERPL-MCNC: 0.57 MG/DL (ref 0.2–1)
BUN SERPL-MCNC: 8 MG/DL (ref 5–25)
CALCIUM SERPL-MCNC: 9.1 MG/DL (ref 8.4–10.2)
CHLORIDE SERPL-SCNC: 106 MMOL/L (ref 96–108)
CHOLEST SERPL-MCNC: 169 MG/DL
CO2 SERPL-SCNC: 28 MMOL/L (ref 21–32)
CREAT SERPL-MCNC: 0.67 MG/DL (ref 0.6–1.3)
EOSINOPHIL # BLD AUTO: 0.25 THOUSAND/ÂΜL (ref 0–0.61)
EOSINOPHIL NFR BLD AUTO: 3 % (ref 0–6)
ERYTHROCYTE [DISTWIDTH] IN BLOOD BY AUTOMATED COUNT: 11.9 % (ref 11.6–15.1)
GFR SERPL CREATININE-BSD FRML MDRD: 105 ML/MIN/1.73SQ M
GLUCOSE P FAST SERPL-MCNC: 94 MG/DL (ref 65–99)
HCT VFR BLD AUTO: 40.6 % (ref 34.8–46.1)
HDLC SERPL-MCNC: 47 MG/DL
HGB BLD-MCNC: 13.2 G/DL (ref 11.5–15.4)
IMM GRANULOCYTES # BLD AUTO: 0.02 THOUSAND/UL (ref 0–0.2)
IMM GRANULOCYTES NFR BLD AUTO: 0 % (ref 0–2)
LDLC SERPL CALC-MCNC: 89 MG/DL (ref 0–100)
LYMPHOCYTES # BLD AUTO: 2.4 THOUSANDS/ÂΜL (ref 0.6–4.47)
LYMPHOCYTES NFR BLD AUTO: 31 % (ref 14–44)
MCH RBC QN AUTO: 28.6 PG (ref 26.8–34.3)
MCHC RBC AUTO-ENTMCNC: 32.5 G/DL (ref 31.4–37.4)
MCV RBC AUTO: 88 FL (ref 82–98)
MONOCYTES # BLD AUTO: 0.5 THOUSAND/ÂΜL (ref 0.17–1.22)
MONOCYTES NFR BLD AUTO: 6 % (ref 4–12)
NEUTROPHILS # BLD AUTO: 4.6 THOUSANDS/ÂΜL (ref 1.85–7.62)
NEUTS SEG NFR BLD AUTO: 59 % (ref 43–75)
NONHDLC SERPL-MCNC: 122 MG/DL
NRBC BLD AUTO-RTO: 0 /100 WBCS
PLATELET # BLD AUTO: 266 THOUSANDS/UL (ref 149–390)
PMV BLD AUTO: 10.7 FL (ref 8.9–12.7)
POTASSIUM SERPL-SCNC: 3.8 MMOL/L (ref 3.5–5.3)
PROT SERPL-MCNC: 7 G/DL (ref 6.4–8.4)
RBC # BLD AUTO: 4.61 MILLION/UL (ref 3.81–5.12)
SODIUM SERPL-SCNC: 141 MMOL/L (ref 135–147)
TRIGL SERPL-MCNC: 163 MG/DL
WBC # BLD AUTO: 7.82 THOUSAND/UL (ref 4.31–10.16)

## 2023-06-03 PROCEDURE — 80053 COMPREHEN METABOLIC PANEL: CPT

## 2023-06-03 PROCEDURE — 85025 COMPLETE CBC W/AUTO DIFF WBC: CPT

## 2023-06-03 PROCEDURE — 80061 LIPID PANEL: CPT

## 2023-06-03 PROCEDURE — 36415 COLL VENOUS BLD VENIPUNCTURE: CPT

## 2023-06-19 ENCOUNTER — OFFICE VISIT (OUTPATIENT)
Dept: DENTISTRY | Facility: CLINIC | Age: 48
End: 2023-06-19

## 2023-06-19 VITALS — DIASTOLIC BLOOD PRESSURE: 57 MMHG | SYSTOLIC BLOOD PRESSURE: 99 MMHG

## 2023-06-19 DIAGNOSIS — K08.109 TEETH MISSING: Primary | ICD-10-CM

## 2023-06-19 PROCEDURE — WIS6000 HEALING ABUTMENT

## 2023-06-19 NOTE — PROGRESS NOTES
Second Stage Implant Uncovery     Pt presents for uncovery of maxillary implants in area of #4,6, 12  Applied topical benzocaine, administered 1 carp 4 % septo 1:100k epi via infiltration    Implant in area of #6 and 12 already exposed  Incision made in area of #4  Cover screws removed and healing abutments placed  Verified seating w/ radiographs       NV: final impressions

## 2023-06-29 ENCOUNTER — OFFICE VISIT (OUTPATIENT)
Dept: DENTISTRY | Facility: CLINIC | Age: 48
End: 2023-06-29

## 2023-06-29 VITALS — TEMPERATURE: 98 F | DIASTOLIC BLOOD PRESSURE: 68 MMHG | SYSTOLIC BLOOD PRESSURE: 99 MMHG | HEART RATE: 76 BPM

## 2023-06-29 DIAGNOSIS — K08.109 TEETH MISSING: Primary | ICD-10-CM

## 2023-06-29 NOTE — PROGRESS NOTES
Denture Adjustment    Pt presents for denture adjustment  Denture seated  Verified internal and removed any interferences that prevent complete and comfortable seating  Pt stated she liked the aesthetics, feel, and comfort  Advised patient to return if he feels any pain or discomfort when wearing dentures for adjustments  2 Updated PA of #20, 21 were taken and reviewed  RCT completed on teeth #20 and #21  Pt understood and left in good spirits  NV: final impression for implants

## 2023-10-02 ENCOUNTER — OFFICE VISIT (OUTPATIENT)
Dept: DENTISTRY | Facility: CLINIC | Age: 48
End: 2023-10-02

## 2023-10-02 DIAGNOSIS — K05.6 PERIODONTAL DISEASE: Primary | ICD-10-CM

## 2023-10-02 PROCEDURE — D4910 PERIODONTAL MAINTENANCE: HCPCS

## 2023-10-02 NOTE — DENTAL PROCEDURE DETAILS
3 / 4 MTH PERIO MAINTENANCE     REVIEWED MED HX: meds, allergies, health changes reviewed in EPIC  CHIEF CONCERN: Looking forward to the final step for implants ! PAIN SCALE:  0  ASA CLASS:  II  PLAQUE:  mild     CALCULUS:  light   BLEEDING:   none    light - moderate  STAIN : moderate  -  heavy  ORAL HYGIENE:  good  -  fair   PERIO: 4B limited opposing teeth    Hand scaled, polished and flossed. Used Cavitron. Oral Hygiene Instruction:  recommended brushing 2 x daily for 2 minutes MIN, recommended flossing daily, reviewed dietary precautions. Visual and Tactile Intraoral/ Extraoral evaluation: Oral and Oropharyngeal cancer evaluation.  No findings     REFERRALS: no referrals provided    CARIES FINDINGS: Patient returning 10/23/2023 for final impressions for implants    Next Recall: 4 month perio maintenance/periodic exam and updated radiographs     NV:10/23/2023 Final impressions for implants  NV2:4 mth perio maint with periodic exam and radiographs

## 2023-10-23 ENCOUNTER — OFFICE VISIT (OUTPATIENT)
Dept: DENTISTRY | Facility: CLINIC | Age: 48
End: 2023-10-23

## 2023-10-23 VITALS — DIASTOLIC BLOOD PRESSURE: 74 MMHG | SYSTOLIC BLOOD PRESSURE: 110 MMHG | HEART RATE: 84 BPM

## 2023-10-23 DIAGNOSIS — K08.409 PARTIAL EDENTULISM, UNSPECIFIED EDENTULISM CLASS: Primary | ICD-10-CM

## 2023-10-23 PROCEDURE — WIS2001 FINAL IMPRESSION - CROWN OR IMPLANT

## 2023-10-23 NOTE — DENTAL PROCEDURE DETAILS
Cheri Flanagan presents to [address] [place] [address] for crown impressions for implants at sites 5, 6, and 12. H&P reviewed with no changes. Pain 0/10, ASA class II. Patient had 3.7mm diameter x 10mm length implants placed at sites #s 5, 6, and 12 in April 2023 and had second stage surgery in June 2023 with healing abutment placement. Implant sites have healed well with good soft-tissue adaptation. Patient lacks stable posterior occlusion so lab fabrication will be conducted in two steps-- patient will require visit for custom abutment try-in with jig to determine proper bite. Healing abutments removed from each implant and impression copings screwed into each implant to hand tightness. Full seating verified radiographically. Closed-tray impression taken of upper arch in light body PVS with medium body wash. Silginate impression taken of lower arch. Impressions sent to lab with instructions to return custom abutments for cementable implant crowns with jig to determine bite.      NV: Custom abutment try-in, bite registration, select tooth shade

## 2023-10-24 ENCOUNTER — HOSPITAL ENCOUNTER (OUTPATIENT)
Dept: RADIOLOGY | Facility: HOSPITAL | Age: 48
Discharge: HOME | End: 2023-10-24
Payer: MEDICARE

## 2023-10-24 DIAGNOSIS — R05.1 ACUTE COUGH: ICD-10-CM

## 2023-10-24 PROCEDURE — 71046 X-RAY EXAM CHEST 2 VIEWS: CPT

## 2023-11-27 ENCOUNTER — OFFICE VISIT (OUTPATIENT)
Dept: DENTISTRY | Facility: CLINIC | Age: 48
End: 2023-11-27

## 2023-11-27 VITALS — HEART RATE: 85 BPM | DIASTOLIC BLOOD PRESSURE: 80 MMHG | SYSTOLIC BLOOD PRESSURE: 116 MMHG

## 2023-11-27 DIAGNOSIS — K08.109 TEETH MISSING: Primary | ICD-10-CM

## 2023-11-27 PROCEDURE — WIS2001 FINAL IMPRESSION - CROWN OR IMPLANT

## 2023-11-27 NOTE — PROGRESS NOTES
Eva Mueller presented for implant abutment try in  Tx Details: Throat pack placed. Removed healing abutments and irrigated with chx. Tried in custom abutments for [facility] implants #5, #6, and #12. Obtained PA radiograph, confirmed abutment fully seated. Bite registration taken with bluebite. Selected shade A2 and pt confirmed with mirror. Will send to [place] for implant crown fabrication.    NV: delivery of implant crowns

## 2023-12-22 ENCOUNTER — APPOINTMENT (OUTPATIENT)
Dept: LAB | Facility: HOSPITAL | Age: 48
End: 2023-12-22
Payer: MEDICARE

## 2023-12-22 ENCOUNTER — OFFICE VISIT (OUTPATIENT)
Dept: DENTISTRY | Facility: CLINIC | Age: 48
End: 2023-12-22

## 2023-12-22 VITALS — HEART RATE: 82 BPM | SYSTOLIC BLOOD PRESSURE: 105 MMHG | DIASTOLIC BLOOD PRESSURE: 57 MMHG

## 2023-12-22 DIAGNOSIS — K08.409 TOOTH MISSING: Primary | ICD-10-CM

## 2023-12-22 DIAGNOSIS — Z00.00 ROUTINE ADULT HEALTH MAINTENANCE: ICD-10-CM

## 2023-12-22 DIAGNOSIS — E55.9 VITAMIN D DEFICIENCY: ICD-10-CM

## 2023-12-22 LAB
25(OH)D3 SERPL-MCNC: 23.8 NG/ML (ref 30–100)
ALBUMIN SERPL BCP-MCNC: 4.3 G/DL (ref 3.5–5)
ALP SERPL-CCNC: 51 U/L (ref 34–104)
ALT SERPL W P-5'-P-CCNC: 11 U/L (ref 7–52)
ANION GAP SERPL CALCULATED.3IONS-SCNC: 6 MMOL/L
AST SERPL W P-5'-P-CCNC: 11 U/L (ref 13–39)
BASOPHILS # BLD AUTO: 0.06 THOUSANDS/ÂΜL (ref 0–0.1)
BASOPHILS NFR BLD AUTO: 1 % (ref 0–1)
BILIRUB SERPL-MCNC: 0.32 MG/DL (ref 0.2–1)
BUN SERPL-MCNC: 12 MG/DL (ref 5–25)
CALCIUM SERPL-MCNC: 8.5 MG/DL (ref 8.4–10.2)
CHLORIDE SERPL-SCNC: 107 MMOL/L (ref 96–108)
CHOLEST SERPL-MCNC: 181 MG/DL
CO2 SERPL-SCNC: 28 MMOL/L (ref 21–32)
CREAT SERPL-MCNC: 0.61 MG/DL (ref 0.6–1.3)
EOSINOPHIL # BLD AUTO: 0.28 THOUSAND/ÂΜL (ref 0–0.61)
EOSINOPHIL NFR BLD AUTO: 3 % (ref 0–6)
ERYTHROCYTE [DISTWIDTH] IN BLOOD BY AUTOMATED COUNT: 12.4 % (ref 11.6–15.1)
GFR SERPL CREATININE-BSD FRML MDRD: 109 ML/MIN/1.73SQ M
GLUCOSE P FAST SERPL-MCNC: 92 MG/DL (ref 65–99)
HCT VFR BLD AUTO: 38.2 % (ref 34.8–46.1)
HDLC SERPL-MCNC: 50 MG/DL
HGB BLD-MCNC: 12.3 G/DL (ref 11.5–15.4)
IMM GRANULOCYTES # BLD AUTO: 0.02 THOUSAND/UL (ref 0–0.2)
IMM GRANULOCYTES NFR BLD AUTO: 0 % (ref 0–2)
LDLC SERPL CALC-MCNC: 104 MG/DL (ref 0–100)
LYMPHOCYTES # BLD AUTO: 2.16 THOUSANDS/ÂΜL (ref 0.6–4.47)
LYMPHOCYTES NFR BLD AUTO: 25 % (ref 14–44)
MCH RBC QN AUTO: 28.3 PG (ref 26.8–34.3)
MCHC RBC AUTO-ENTMCNC: 32.2 G/DL (ref 31.4–37.4)
MCV RBC AUTO: 88 FL (ref 82–98)
MONOCYTES # BLD AUTO: 0.42 THOUSAND/ÂΜL (ref 0.17–1.22)
MONOCYTES NFR BLD AUTO: 5 % (ref 4–12)
NEUTROPHILS # BLD AUTO: 5.81 THOUSANDS/ÂΜL (ref 1.85–7.62)
NEUTS SEG NFR BLD AUTO: 66 % (ref 43–75)
NONHDLC SERPL-MCNC: 131 MG/DL
NRBC BLD AUTO-RTO: 0 /100 WBCS
PLATELET # BLD AUTO: 273 THOUSANDS/UL (ref 149–390)
PMV BLD AUTO: 10.7 FL (ref 8.9–12.7)
POTASSIUM SERPL-SCNC: 4.2 MMOL/L (ref 3.5–5.3)
PROT SERPL-MCNC: 6.6 G/DL (ref 6.4–8.4)
RBC # BLD AUTO: 4.34 MILLION/UL (ref 3.81–5.12)
SODIUM SERPL-SCNC: 141 MMOL/L (ref 135–147)
TRIGL SERPL-MCNC: 133 MG/DL
WBC # BLD AUTO: 8.75 THOUSAND/UL (ref 4.31–10.16)

## 2023-12-22 PROCEDURE — 80061 LIPID PANEL: CPT

## 2023-12-22 PROCEDURE — WIS6065 PB IMPLANT SUPPORTED CROWN

## 2023-12-22 PROCEDURE — 36415 COLL VENOUS BLD VENIPUNCTURE: CPT

## 2023-12-22 PROCEDURE — 85025 COMPLETE CBC W/AUTO DIFF WBC: CPT

## 2023-12-22 PROCEDURE — 80053 COMPREHEN METABOLIC PANEL: CPT

## 2023-12-22 PROCEDURE — 82306 VITAMIN D 25 HYDROXY: CPT

## 2023-12-22 NOTE — DENTAL PROCEDURE DETAILS
Cheri Flanagan presents to [facility] for implant crown delivery teeth #s 4, 6, 12. H&P reviewed with no changes. Pain 0/10, ASA class II.    Removed healing abutments. Inserted custom abutments on all 3 sites and torqued to hand tightness. Verified full seating radiographically.    Torqued abutments to 35 pardo*cm.     Packed Marv tape in screw access holes. Fitted implant crowns and verified full seating, contacts, occlusion. Verified full seating radiographically.     Cemented implant crowns using Calibra biocement. Cleaned excess. Post-op radiographs taken.     Patient satisfied with bite and aesthetics. Patient says she would like to get lower crowns done ASAP as her insurance preauthorization expires at the end of 2023. Asked patient to discuss this with .     NV: #20, #21 crown preps

## 2024-01-02 ENCOUNTER — OFFICE VISIT (OUTPATIENT)
Dept: DENTISTRY | Facility: CLINIC | Age: 49
End: 2024-01-02

## 2024-01-02 VITALS — DIASTOLIC BLOOD PRESSURE: 83 MMHG | HEART RATE: 89 BPM | SYSTOLIC BLOOD PRESSURE: 114 MMHG

## 2024-01-02 DIAGNOSIS — Z98.890 HISTORY OF ORAL SURGERY: Primary | ICD-10-CM

## 2024-01-02 PROCEDURE — D0191 ASSESSMENT OF A PATIENT: HCPCS

## 2024-01-02 NOTE — DENTAL PROCEDURE DETAILS
Patient presents for dental emergency visit.    PMH reviewed, no changes, ASA II.   Patient reports mild discomfort around her recent implant crown.    CC - I can't floss where I had my implant crown in the upper left.    Findings: [place] tight contact around implant crown #12 which shreds the floss as it passes through.    - Used serrated interproximal saws to open the mesial contact, which was adjacent to a natural tooth. Appropriate contact achieved on the mesial.  - Attempted to use Superfloss to remove possible excess cement between #12 Distal and the adjacent implant crown. Attempts at achieving ideal contour on the distal were not successful. We think this is due to the crown contours and not due to excess cement.   - Informed the patient that the definitive treatment is to have lab remake the crown.  - Informed the patient that a compromise is to accept that #12 distal contact is not floss-able, and use proxy brushes to remove plaque underneath the contact. This would still be effective at removing plaque for the goal of preventing gingival inflammation around the implant, and would not be a severe inconvenience since the patient already uses proxy brushes to clean between her few teeth with crowding.  - The patient was happy with this solution and dismissed satisfied.    NV: #20, 21 crown preps.

## 2024-02-21 PROBLEM — Z01.419 ENCOUNTER FOR ANNUAL ROUTINE GYNECOLOGICAL EXAMINATION: Status: RESOLVED | Noted: 2021-01-07 | Resolved: 2024-02-21

## 2024-03-14 ENCOUNTER — HOSPITAL ENCOUNTER (OUTPATIENT)
Dept: MAMMOGRAPHY | Facility: CLINIC | Age: 49
End: 2024-03-14

## 2024-03-14 VITALS — WEIGHT: 166 LBS | BODY MASS INDEX: 26.68 KG/M2 | HEIGHT: 66 IN

## 2024-03-14 DIAGNOSIS — Z12.31 VISIT FOR SCREENING MAMMOGRAM: ICD-10-CM

## 2024-03-14 PROCEDURE — 77067 SCR MAMMO BI INCL CAD: CPT

## 2024-03-14 PROCEDURE — 77063 BREAST TOMOSYNTHESIS BI: CPT

## 2024-05-08 ENCOUNTER — ANNUAL EXAM (OUTPATIENT)
Dept: OBGYN CLINIC | Facility: CLINIC | Age: 49
End: 2024-05-08

## 2024-05-08 VITALS
SYSTOLIC BLOOD PRESSURE: 122 MMHG | BODY MASS INDEX: 26.58 KG/M2 | HEIGHT: 66 IN | WEIGHT: 165.4 LBS | DIASTOLIC BLOOD PRESSURE: 84 MMHG

## 2024-05-08 DIAGNOSIS — Z01.419 ENCOUNTER FOR ANNUAL ROUTINE GYNECOLOGICAL EXAMINATION: Primary | ICD-10-CM

## 2024-05-08 DIAGNOSIS — Z12.31 VISIT FOR SCREENING MAMMOGRAM: ICD-10-CM

## 2024-05-08 DIAGNOSIS — Z30.431 CONTRACEPTIVE, SURVEILLANCE, INTRAUTERINE DEVICE: ICD-10-CM

## 2024-05-08 PROCEDURE — 99396 PREV VISIT EST AGE 40-64: CPT | Performed by: OBSTETRICS & GYNECOLOGY

## 2024-05-08 NOTE — PROGRESS NOTES
Pt is a 49 y.o.  with Patient's last menstrual period was 2024 (approximate). using Paragard for BC presents for preventive care.   She notes the same partner since her last STI evaluation. In her lifetime she has been involved with 1 partner .   Safe sexual practices (monogomy) are followed consistently.         She does  feel safe in the relationship.  She does feel safe in her home.    Her calcium intake encompasses  multivitamin and milk (cow, goat, almond, cashew, soy, etc) for a total of 5-6 servings daily on average.  She does take additional Vitamin D (MVI or supplement).    She exercises 5-6 times per week.  Her menses occur every 28-90 Days, last  6-10  days and require regular pad every 4-5 hours.  Menstrual History:  OB History          4    Para   4    Term   4            AB        Living   4         SAB        IAB        Ectopic        Multiple        Live Births   4           Obstetric Comments   FT C/S x 4    Menarche: 14    28-90/6-10/regular pad every 4 hours              Menarche age: 14  Patient's last menstrual period was 2024 (approximate).          She has never recieved an HPV vaccine.  tobacco use : does use tobacco              Colonoscopy: 10/9/2020-polyp, repeat 7 years  Mammogram: 3/14/2024-wnl, repeat 1 year rx given  Pap: 2022-wnl, HRHPV neg, repeat next year    Past Medical History:   Diagnosis Date    Colon polyp     Diverticulitis     H. pylori duodenitis     Heart palpitations 2020    Pap smear for cervical cancer screening     2015--wnl, HPV neg; 2018 wnl, HPV neg; 2022-wnl, HRHPV neg    Varicella vaccination        Past Surgical History:   Procedure Laterality Date    BREAST BIOPSY Left 2021    lt stereo bx calcs benign     SECTION      x4    COLONOSCOPY      2 benign polyps repeat age 52    INSERTION OF INTRAUTERINE DEVICE (IUD)  2020    Mirena    MAMMO STEREOTACTIC BREAST BIOPSY LEFT (ALL INC) Left  2021    MOUTH SURGERY      implant of tooth x 2 and then implant x 3    OR COLONOSCOPY FLX DX W/COLLJ SPEC WHEN PFRMD N/A 05/15/2017    Procedure: COLONOSCOPY;  Surgeon: Lane Rajput MD;  Location: [facility] GI LAB;  Service: Gastroenterology    REMOVAL OF INTRAUTERINE DEVICE (IUD)  2020    Paraguard    WISDOM TOOTH EXTRACTION         OB History    Para Term  AB Living   4 4 4     4   SAB IAB Ectopic Multiple Live Births           4      # Outcome Date GA Lbr Isaias/2nd Weight Sex Delivery Anes PTL Lv   4 Term            3 Term            2 Term            1 Term               Obstetric Comments   FT C/S x 4      Menarche: 14      28-90/6-10/regular pad every 4 hours            Current Outpatient Medications:     atenolol (TENORMIN) 25 mg tablet, TAKE ONE TABLET BY MOUTH EVERY DAY, Disp: 90 tablet, Rfl: 1    Cholecalciferol (Vitamin D3) 50 MCG (2000 UT) capsule, Take 1 capsule (2,000 Units total) by mouth daily, Disp: 90 capsule, Rfl: 3    intrauterine copper (PARAGARD) IUD, 1 each by Intrauterine route once, Disp: , Rfl:     multivitamin (THERAGRAN) TABS, Take 1 tablet by mouth, Disp: , Rfl:     Omega-3 Fatty Acids (fish oil) 1,000 mg, Take 1,000 mg by mouth daily, Disp: , Rfl:     No Known Allergies    Social History     Socioeconomic History    Marital status: /Civil Union     Spouse name: Maurice     Number of children: 4    Years of education: HS    Highest education level: None   Occupational History    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     Comment: Lifetime patners; 1   Other Topics Concern    None   Social History Narrative    Congregation: [employer]    Accepts  "blood products        Exercise: 5x/week x 60 minutes kickboxing    Calcium: Multivitamin daily, 2 c almond milk daily, 1 yogurt weekly     Social Determinants of Health     Financial Resource Strain: Not on file   Food Insecurity: Not on file   Transportation Needs: Not on file   Physical Activity: Not on file   Stress: Not on file   Social Connections: Not on file   Intimate Partner Violence: Not on file   Housing Stability: Not on file       Family History   Problem Relation Age of Onset    Uterine cancer Mother     Diabetes Mother     Hypertension Mother     No Known Problems Father     Fibroids Sister         hysterectomy    Other Sister         bilateral hip replacement    Liver disease Sister         cyst on liver    Other Sister         hysterectomy due to menorrhagia    Kidney disease Sister     No Known Problems Sister     No Known Problems Sister     Hypertension Brother     No Known Problems Brother     Lung disease Brother     Hypertension Brother     Sleep apnea Brother     Diverticulosis Brother     No Known Problems Brother     Other Brother         palpitations    Brain cancer Maternal Grandmother     No Known Problems Maternal Grandfather     Hypertension Paternal Grandmother     Diabetes Paternal Grandmother     No Known Problems Paternal Grandfather         old age >100 years    No Known Problems Daughter     No Known Problems Daughter     No Known Problems Daughter     Breast cancer Maternal Aunt 70    Fibroids Maternal Aunt 40        hysterectomy    Lung cancer Maternal Uncle     Stroke Maternal Uncle     No Known Problems Half-Brother     Ovarian cancer Neg Hx     Colon cancer Neg Hx        Blood pressure 122/84, height 5' 6\" (1.676 m), weight 75 kg (165 lb 6.4 oz), last menstrual period 02/12/2024, not currently breastfeeding. and Body mass index is 26.7 kg/m².    Physical Exam  Constitutional:       General: She is not in acute distress.     Appearance: Normal appearance. She is " well-developed and normal weight. She is not ill-appearing.   HENT:      Head: Normocephalic and atraumatic.   Eyes:      Extraocular Movements: Extraocular movements intact.      Conjunctiva/sclera: Conjunctivae normal.   Neck:      Thyroid: No thyromegaly.      Trachea: No tracheal deviation.   Cardiovascular:      Rate and Rhythm: Normal rate and regular rhythm.      Heart sounds: Normal heart sounds.   Pulmonary:      Effort: Pulmonary effort is normal. No respiratory distress.      Breath sounds: Normal breath sounds. No stridor. No wheezing or rales.   Abdominal:      General: Bowel sounds are normal. There is no distension.      Palpations: Abdomen is soft. There is no mass.      Tenderness: There is no abdominal tenderness. There is no guarding or rebound.      Hernia: No hernia is present.   Musculoskeletal:         General: No tenderness. Normal range of motion.      Cervical back: Normal range of motion and neck supple.   Lymphadenopathy:      Cervical: No cervical adenopathy.   Skin:     General: Skin is warm.      Findings: No erythema or rash.   Neurological:      Mental Status: She is alert and oriented to person, place, and time.   Psychiatric:         Mood and Affect: Mood normal.         Behavior: Behavior normal.         Thought Content: Thought content normal.         Judgment: Judgment normal.         Breasts: breasts appear normal, no suspicious masses, no skin or nipple changes or axillary nodes, symmetric fibrous changes in both upper outer quadrants.      vulva: normal external genitalia for age and no lesions, masses, epithelial changes, or exudate  vagina: color pink and rugae  well formed rugae  cervix: parous, no lesions , and  IUD string  3 cm  uterus: NSSC, AF, NT, mobile  adnexa: no masses or tenderness  rectum: no masses or nodularity      A/P:  Pt is a 49 y.o.  with      Cheri was seen today for gynecologic exam.    Diagnoses and all orders for this visit:    Encounter for  annual routine gynecological examination  -stable examination  -pap up to date  -colonoscopy up to date    Visit for screening mammogram  -     Mammo screening bilateral w 3d & cad; Future    Contraceptive, surveillance, intrauterine device  IUD strings normal length

## 2024-09-06 ENCOUNTER — NURSE TRIAGE (OUTPATIENT)
Age: 49
End: 2024-09-06

## 2024-09-06 NOTE — TELEPHONE ENCOUNTER
"LMP was in February   Patient called with painful intercourse over the last month, she reports pain is in vaginal area and subsides when relations are over. No bleeding, no vaginal discharge, no abrasions, no rash, no fever, no urinary symptoms, just dryness. Has not used any lubricant or other creams. Advised to try and use coconut oil or water based vaginal lubricant that can be purchased OTC. If symptoms become worse- discharge, constant pain, or if this is not working to call office back for appointment. Patient verbalized understanding, she asked if Dr. Roland has any other recommendations to send to her via [facility].     Reason for Disposition   MILD to MODERATE pelvic pain lasting < 2 hours or comes and goes (cramps)    Answer Assessment - Initial Assessment Questions  1. LOCATION: \"Where does it hurt?\"       [place] pain - burning and discomfort   2. RADIATION: \"Does the pain shoot anywhere else?\" (e.g., lower back, groin, thighs)      Just in vagina   3. ONSET: \"When did the pain begin?\" (e.g., minutes, hours or days ago)        1 month   4. SUDDEN: \"Gradual or sudden onset?\"      Sudden   5. PATTERN \"Does the pain come and go, or is it constant?\"     - If constant: \"Is it getting better, staying the same, or worsening?\"       (Note: Constant means the pain never goes away completely; most serious pain is constant and gets worse over time)      - If intermittent: \"How long does it last?\" \"Do you have pain now?\"      (Note: Intermittent means the pain goes away completely between bouts)      Constant pain with intercourse, pain is only with intercourse  6. SEVERITY: \"How bad is the pain?\"  (e.g., Scale 1-10; mild, moderate, or severe)    - MILD (1-3): doesn't interfere with normal activities, area soft and not tender to touch     - MODERATE (4-7): interferes with normal activities or awakens from sleep, tender to touch     - SEVERE (8-10): excruciating pain, doubled over, unable to do any normal activities " "      Severe   7. RECURRENT SYMPTOM: \"Have you ever had this type of pelvic pain before?\" If Yes, ask: \"When was the last time?\" and \"What happened that time?\"       1 month has had this symptoms  8. CAUSE: \"What do you think is causing the pelvic pain?\"      Dryness   9. RELIEVING/AGGRAVATING FACTORS: \"What makes it better or worse?\" (e.g., activity/rest, sexual intercourse, voiding, passing stool)      When intercourse is over pain done  10. OTHER SYMPTOMS: \"Has there been any other symptoms?\" (e.g., fever, vaginal bleeding, vaginal discharge, diarrhea, constipation, or voiding problems?\"        denies  11. PREGNANCY: \"Is there any chance you are pregnant?\" \"When was your last menstrual period?\"        denies    Protocols used: Pelvic Pain - Female-ADULT-[place]    "

## 2024-09-06 NOTE — TELEPHONE ENCOUNTER
Regarding: Pain  ----- Message from Yuko WALTER sent at 9/6/2024  2:54 PM EDT -----  Pt called requesting to speak with a nurse or  about pain during intercourse. Asked if she can be called back to discuss the pain she is having. Unable to transfer to [facility] due to transfer button not working please call pt back to discuss.

## 2024-09-14 ENCOUNTER — NURSE TRIAGE (OUTPATIENT)
Dept: OTHER | Facility: OTHER | Age: 49
End: 2024-09-14

## 2024-09-14 NOTE — TELEPHONE ENCOUNTER
"Reason for Disposition  • Tender lump (swelling or \"ball\") at vaginal opening    Answer Assessment - Initial Assessment Questions  1. SYMPTOM: \"What's the main symptom you're concerned about?\" (e.g., pain, itching, dryness)      Patient experiencing painful intercourse. Was advised to use lubricant or coconut oil for dryness. Patient states she felt a mass or \"ball\" while showering tonight. States, as soon as she inserts a finger, she is able to feel it. It is bigger than a quarter and round.    2. LOCATION: \"Where is the mass located?\" (e.g., inside/outside, left/right)      Inside vagina    3. ONSET: \"When did the symptoms  start?\"      Noticed mass tonight.     4. PAIN: \"Is there any pain?\" If Yes, ask: \"How bad is it?\" (Scale: 1-10; mild, moderate, severe)      Denies pain currently    5. ITCHING: \"Is there any itching?\" If Yes, ask: \"How bad is it?\" (Scale: 1-10; mild, moderate, severe)      Denies    6. CAUSE: \"What do you think is causing the discharge?\" \"Have you had the same problem before?\" \"What happened then?\"      Denies vaginal discharge    7. OTHER SYMPTOMS: \"Do you have any other symptoms?\" (e.g., fever, itching, vaginal bleeding, pain with urination, injury to genital area, vaginal foreign body)      \"Tired feet\", gassiness    8. PREGNANCY: \"Is there any chance you are pregnant?\" \"When was your last menstrual period?\"      LMP 2/2024    Protocols used: Vaginal Symptoms-Adult-[facility]    "

## 2024-09-14 NOTE — TELEPHONE ENCOUNTER
"Regarding: Vaginal concern  ----- Message from Zuleyma JONES sent at 9/14/2024  6:58 PM EDT -----  \"I went to the doctor the other day because during sex I was experiencing a lot of pain. I was told to try coconut oil because vaginal dryness was normal during menopause. As I was putting oil, I noticed like a 'ball' like coming out of my vagina.\"    "

## 2024-09-16 ENCOUNTER — NURSE TRIAGE (OUTPATIENT)
Age: 49
End: 2024-09-16

## 2024-09-16 ENCOUNTER — OFFICE VISIT (OUTPATIENT)
Dept: OBGYN CLINIC | Facility: CLINIC | Age: 49
End: 2024-09-16

## 2024-09-16 VITALS
HEIGHT: 66 IN | DIASTOLIC BLOOD PRESSURE: 72 MMHG | SYSTOLIC BLOOD PRESSURE: 108 MMHG | BODY MASS INDEX: 27.16 KG/M2 | WEIGHT: 169 LBS

## 2024-09-16 DIAGNOSIS — N89.8 VAGINAL MASS: ICD-10-CM

## 2024-09-16 DIAGNOSIS — B96.89 BACTERIAL VAGINITIS: Primary | ICD-10-CM

## 2024-09-16 DIAGNOSIS — N94.9 VAGINAL BURNING: ICD-10-CM

## 2024-09-16 DIAGNOSIS — N76.0 BACTERIAL VAGINITIS: Primary | ICD-10-CM

## 2024-09-16 DIAGNOSIS — N94.10 DYSPAREUNIA IN FEMALE: ICD-10-CM

## 2024-09-16 LAB
BV WHIFF TEST VAG QL: POSITIVE
CLUE CELLS SPEC QL WET PREP: ABNORMAL
PH SMN: ABNORMAL [PH]
SL AMB POCT WET MOUNT: ABNORMAL
T VAGINALIS VAG QL WET PREP: NEGATIVE
YEAST VAG QL WET PREP: NEGATIVE

## 2024-09-16 PROCEDURE — 87210 SMEAR WET MOUNT SALINE/INK: CPT | Performed by: OBSTETRICS & GYNECOLOGY

## 2024-09-16 PROCEDURE — 99214 OFFICE O/P EST MOD 30 MIN: CPT | Performed by: OBSTETRICS & GYNECOLOGY

## 2024-09-16 RX ORDER — METRONIDAZOLE 500 MG/1
500 TABLET ORAL EVERY 12 HOURS SCHEDULED
Qty: 14 TABLET | Refills: 0 | Status: SHIPPED | OUTPATIENT
Start: 2024-09-16 | End: 2024-09-23

## 2024-09-16 NOTE — TELEPHONE ENCOUNTER
"Pt called in stating she started using coconut oil within the last couple of weeks during intercourse. Since then has noticed a lump on her right labia, causes pain during intercourse. States lump is larger than a quarter but smaller than a golf ball. Denies any drainage, bleeding or fever. Pt given appointment for tomorrow and is aware to call back with any worsening symptoms. Pt thankful.    Reason for Disposition   Tender lump (swelling or \"ball\") at vaginal opening    Answer Assessment - Initial Assessment Questions  1. SYMPTOM: \"What's the main symptom you're concerned about?\" (e.g., pain, itching, dryness)      Lump on right side of vaginal wall  2. LOCATION: \"Where is the  symptom located?\" (e.g., inside/outside, left/right)      See above  3. ONSET: \"When did the  symptom  start?\"      Within last two weeks  4. PAIN: \"Is there any pain?\" If Yes, ask: \"How bad is it?\" (Scale: 1-10; mild, moderate, severe)      denies  5. ITCHING: \"Is there any itching?\" If Yes, ask: \"How bad is it?\" (Scale: 1-10; mild, moderate, severe)      denies  6. CAUSE: \"What do you think is causing the discharge?\" \"Have you had the same problem before? What happened then?\"      Denies discharge  7. OTHER SYMPTOMS: \"Do you have any other symptoms?\" (e.g., fever, itching, vaginal bleeding, pain with urination, injury to genital area, vaginal foreign body)      denies  8. PREGNANCY: \"Is there any chance you are pregnant?\" \"When was your last menstrual period?\"      Denies, LMP in February- has IUD    Protocols used: Vaginal Symptoms-ADULT-[place]    "

## 2024-09-16 NOTE — PROGRESS NOTES
Patient is a 49 y.o.  with No LMP recorded (lmp unknown). (Menstrual status: Amenorrheic other). who presents requesting evaluation of feeling a ball in her vagina.    She reports that she has been recently having discomfort with intercourse. She reports she called the office and spoke to someone who advised her to try coconut oil. She reports when she went to apply it she felt something like a ball in her vagina.    She denies any abnormal vaginal bleeding. She reports her LMP was in 2024.     She reports she feels burning with intercourse, not like something is blocked. She denies any vaginal discharge.     Past Medical History:   Diagnosis Date    Colon polyp     Diverticulitis     H. pylori duodenitis     Heart palpitations 2020    Pap smear for cervical cancer screening     2015--wnl, HPV neg; 2018 wnl, HPV neg; 2022-wnl, HRHPV neg    Varicella vaccination        Past Surgical History:   Procedure Laterality Date    BREAST BIOPSY Left 2021    lt stereo bx calcs benign     SECTION      x4    COLONOSCOPY      2 benign polyps repeat age 52    INSERTION OF INTRAUTERINE DEVICE (IUD)  2020    Mirena    MAMMO STEREOTACTIC BREAST BIOPSY LEFT (ALL INC) Left 2021    MOUTH SURGERY      implant of tooth x 2 and then implant x 3    KY COLONOSCOPY FLX DX W/COLLJ SPEC WHEN PFRMD N/A 05/15/2017    Procedure: COLONOSCOPY;  Surgeon: Lane Rajput MD;  Location: [facility];  Service: Gastroenterology    REMOVAL OF INTRAUTERINE DEVICE (IUD)  2020    Paraguard    WISDOM TOOTH EXTRACTION         OB History    Para Term  AB Living   4 4 4     4   SAB IAB Ectopic Multiple Live Births           4      # Outcome Date GA Lbr Isaias/2nd Weight Sex Type Anes PTL Lv   4 Term            3 Term            2 Term            1 Term               Obstetric Comments   FT C/S x 4      Menarche: 14      28-90/6-10/regular pad every 4 hours           Current Outpatient  Medications:     atenolol (TENORMIN) 25 mg tablet, TAKE ONE TABLET BY MOUTH EVERY DAY, Disp: 90 tablet, Rfl: 1    Cholecalciferol (Vitamin D3) 50 MCG (2000 UT) capsule, Take 1 capsule (2,000 Units total) by mouth daily, Disp: 90 capsule, Rfl: 3    intrauterine copper (PARAGARD) IUD, 1 each by Intrauterine route once, Disp: , Rfl:     metroNIDAZOLE (FLAGYL) 500 mg tablet, Take 1 tablet (500 mg total) by mouth every 12 (twelve) hours for 7 days, Disp: 14 tablet, Rfl: 0    multivitamin (THERAGRAN) TABS, Take 1 tablet by mouth, Disp: , Rfl:     Omega-3 Fatty Acids (fish oil) 1,000 mg, Take 1,000 mg by mouth daily, Disp: , Rfl:     No Known Allergies    Social History     Socioeconomic History    Marital status: /Civil Union     Spouse name: Maurice     Number of children: 4    Years of education:     Highest education level: Not on file   Occupational History    Occupation: lunch lady at [employer]   Tobacco Use    Smoking status: Light Smoker     Types: Cigarettes    Smokeless tobacco: Never    Tobacco comments:     Social smoker   Vaping Use    Vaping status: Never Used   Substance and Sexual Activity    Alcohol use: No    Drug use: No    Sexual activity: Yes     Partners: Male     Birth control/protection: I.U.D.     Comment: Lifetime patners; 1   Other Topics Concern    Not on file   Social History Narrative    Restoration: Orthodox    Accepts blood products        Exercise: 5x/week x 60 minutes kickboxing    Calcium: Multivitamin daily, 2 c almond milk daily, 1 yogurt weekly     Social Determinants of Health     Financial Resource Strain: Not on file   Food Insecurity: Not on file   Transportation Needs: Not on file   Physical Activity: Not on file   Stress: Not on file   Social Connections: Not on file   Intimate Partner Violence: Not on file   Housing Stability: Not on file       Family History   Problem Relation Age of Onset    Uterine cancer Mother     Diabetes Mother     Hypertension Mother     No  Known Problems Father     Fibroids Sister         hysterectomy    Other Sister         bilateral hip replacement    Liver disease Sister         cyst on liver    Other Sister         hysterectomy due to menorrhagia    Kidney disease Sister     No Known Problems Sister     No Known Problems Sister     Hypertension Brother     No Known Problems Brother     Lung disease Brother     Hypertension Brother     Sleep apnea Brother     Diverticulosis Brother     No Known Problems Brother     Other Brother         palpitations    Brain cancer Maternal Grandmother     No Known Problems Maternal Grandfather     Hypertension Paternal Grandmother     Diabetes Paternal Grandmother     No Known Problems Paternal Grandfather         old age >100 years    No Known Problems Daughter     No Known Problems Daughter     No Known Problems Daughter     Breast cancer Maternal Aunt 70    Fibroids Maternal Aunt 40        hysterectomy    Lung cancer Maternal Uncle     Stroke Maternal Uncle     No Known Problems Half-Brother     Ovarian cancer Neg Hx     Colon cancer Neg Hx        Review of Systems   Constitutional:  Negative for chills, fatigue, fever and unexpected weight change.   HENT:  Negative for congestion, mouth sores and sore throat.    Respiratory:  Negative for cough, chest tightness, shortness of breath and wheezing.    Cardiovascular:  Negative for chest pain and palpitations.   Gastrointestinal:  Negative for abdominal distention, abdominal pain, constipation, diarrhea, nausea and vomiting.   Endocrine: Negative for cold intolerance and heat intolerance.   Genitourinary:  Positive for dyspareunia. Negative for dysuria, genital sores, menstrual problem, pelvic pain, vaginal bleeding, vaginal discharge and vaginal pain.   Musculoskeletal:  Negative for arthralgias.   Skin:  Negative for color change and rash.   Neurological:  Negative for dizziness, light-headedness and headaches.   Hematological:  Negative for adenopathy.  "      Blood pressure 108/72, height 5' 6\" (1.676 m), weight 76.7 kg (169 lb), not currently breastfeeding. and Body mass index is 27.28 kg/m².    Physical Exam  Constitutional:       General: She is not in acute distress.     Appearance: Normal appearance. She is normal weight. She is not ill-appearing.   HENT:      Head: Normocephalic and atraumatic.   Eyes:      Extraocular Movements: Extraocular movements intact.      Conjunctiva/sclera: Conjunctivae normal.   Pulmonary:      Effort: Pulmonary effort is normal.   Abdominal:      General: Bowel sounds are normal. There is no distension.      Palpations: Abdomen is soft. There is no mass.      Tenderness: There is no abdominal tenderness. There is no guarding or rebound.      Hernia: No hernia is present.   Musculoskeletal:         General: Normal range of motion.      Cervical back: Normal range of motion.      Right lower leg: No edema.      Left lower leg: No edema.   Skin:     General: Skin is warm.      Findings: No erythema or rash.   Neurological:      Mental Status: She is alert and oriented to person, place, and time.   Psychiatric:         Mood and Affect: Mood normal.         Behavior: Behavior normal.         Thought Content: Thought content normal.         Judgment: Judgment normal.          vulva: normal external genitalia for age and no lesions, masses, epithelial changes, or exudate  vagina: color pink, rugae  well formed rugae, and discharge  yellow, mucoid, and odiferous  cervix: parous, no lesions , and  IUD string  3 cm  uterus: NSSC, AF, NT, mobile  adnexa: no masses or tenderness  No prolapse noted  No masses in vaginal noted    Wet mount/koh: +BV    A/P:  Pt is a 49 y.o.  with      Cheri was seen today for gynecology problem.    Diagnoses and all orders for this visit:    Bacterial vaginitis  -     metroNIDAZOLE (FLAGYL) 500 mg tablet; Take 1 tablet (500 mg total) by mouth every 12 (twelve) hours for 7 days  -     POCT wet " mount    Vaginal burning  -     POCT wet mount  -likely due to BV    Dyspareunia in female  -     POCT wet mount  -likely due to BV    Vaginal mass  No mass noted on exam. Pt reassured

## 2025-01-24 ENCOUNTER — OFFICE VISIT (OUTPATIENT)
Dept: DENTISTRY | Facility: CLINIC | Age: 50
End: 2025-01-24

## 2025-01-24 VITALS — SYSTOLIC BLOOD PRESSURE: 126 MMHG | HEART RATE: 104 BPM | DIASTOLIC BLOOD PRESSURE: 66 MMHG

## 2025-01-24 DIAGNOSIS — Z01.20 ENCOUNTER FOR DENTAL EXAMINATION: Primary | ICD-10-CM

## 2025-01-24 PROCEDURE — D0140 LIMITED ORAL EVALUATION - PROBLEM FOCUSED: HCPCS

## 2025-01-24 PROCEDURE — D0220 INTRAORAL - PERIAPICAL FIRST RADIOGRAPHIC IMAGE: HCPCS

## 2025-01-24 NOTE — PROGRESS NOTES
Limited Exam    Cheri Flanagan 49 y.o. female presents with self to [facility] for Limited exam  PMH reviewed, no changes, ASA II. Significant medical history: Premature ventricular contractions. Significant allergies: NKA. Significant medications: NSF.    Chief complaint:  My bridge is cracked    Consent:  Discussed that limited exam focuses on problem area, and same day tx is not guaranteed.  Patient explained to if they wish to have anything else evaluated, they need to return to the practice at which they are a patient of record or schedule a comprehensive exam afterwards.  Patient understands and consent was given by self via verbal consent.    Subjective history:   Pt #19-21 bridge with #19 as cantilever and #21/20 as abutments that was done 15 years ago. Pt had RCT #20/21 completed 2023 by endo specialist. Access was never definitively addressed. Pt says she noticed fracture of porcelain of her teeth but she is in no pain.     Objective clinical findings:   Oral cancer screening: normal.   Extraoral exam:  Pt has yellowish 2 mm vesicle on upper lip. No symptoms. Been there for 5 months. Recommended pt to let their PCP know and if needed derm referral.  .  Intraoral exam: no remarkable findings.     Radiographs: Select PA(s) - #20/21 .     Pulp testing:  #20/21  Percussion: Normal; Palpation: Normal.    Assessment:  #20/21 open access holes, porcelain chip ( no sharp edges), mobility of #19-21 bridge  Suspicious apical #21, periapical of #20 hasn't changed much    Plan:   Remove bridge #19-21 to determine restorability, if restorable, temporize #20-21 (we will not be doing cantilever bridge)  Offer patient options to retreat RCT vs. Monitor w/ temp. Vs continue with definitive crown with risk of needing retreatment in future   Periodic/perio eval (last maintenance 10/2/23)  Definitive tx plan    Grossly cleaned access chamber. Rinsed with peridex. Temporarily filled access with IRM. Post op instructions  given    Referral(s): None needed.  Rx: None.  Comprehensive care disposition:  Pt to continue care at [facility] .    Patient dismissed ambulatory and alert.    NV: Remove #19-21 bridge and eval restorability, determine RCT retreat.    Attending:  Dr. Montejo examined patient

## 2025-02-04 ENCOUNTER — OFFICE VISIT (OUTPATIENT)
Dept: DENTISTRY | Facility: CLINIC | Age: 50
End: 2025-02-04

## 2025-02-04 DIAGNOSIS — Z01.20 ENCOUNTER FOR DENTAL EXAMINATION: Primary | ICD-10-CM

## 2025-02-04 PROCEDURE — D0220 INTRAORAL - PERIAPICAL FIRST RADIOGRAPHIC IMAGE: HCPCS

## 2025-02-04 PROCEDURE — D0140 LIMITED ORAL EVALUATION - PROBLEM FOCUSED: HCPCS

## 2025-02-04 NOTE — PROGRESS NOTES
Limited Exam    Cheri Flanagan 49 y.o. female presents with self to [facility] for Limited exam  PMH reviewed, no changes, ASA II. Significant medical history: see chart. Significant allergies: NKA. Significant medications: see chart.    Chief complaint:  My bridge came off while I was flossing    Consent:  Discussed that limited exam focuses on problem area, and same day tx is not guaranteed.  Patient explained to if they wish to have anything else evaluated, they need to return to the practice at which they are a patient of record or schedule a comprehensive exam afterwards.  Patient understands and consent was given by self via verbal consent.    Subjective history:    Pt was recently flossing and #19-21 cantilever cantilever bridge came off. Pt temporarily recemented it with over the counter cement.     Objective clinical findings:   Oral cancer screening: normal.   Extraoral exam: no remarkable findings.  Intraoral exam: no remarkable findings.     Radiographs: Single PA - 20/21 .     Pulp testing:  #20 previously RCT, distal caries, wnl percussion/palpation  #21 previously RCT, wnl percussion/palpation    Assessment:  #21/20 previously RCT    Plan:   #21/20 post, core, crown  Pt wants to delay implant placement #19 for the time being    Procedure   #19 cantilever was removed from #19-21 bridge   Recemented #20-21 with IRM, cleaned excess cement  Checked occlusion, smoothed fractured porcelain    Referral(s): None needed.  Rx: None.  Comprehensive care disposition:  Pt is of record .    Patient dismissed ambulatory and alert.    NV: #20/21 P/C & prep for #20/21 crowns.    Attending: Dr. Warren checked xrays .

## 2025-02-26 ENCOUNTER — OFFICE VISIT (OUTPATIENT)
Dept: DENTISTRY | Facility: CLINIC | Age: 50
End: 2025-02-26

## 2025-02-26 DIAGNOSIS — Z98.890 PREVIOUSLY INITIATED ENDODONTIC THERAPY COMPLETED: Primary | ICD-10-CM

## 2025-02-26 DIAGNOSIS — Z91.89 AT RISK FOR FRACTURE OF TOOTH DUE TO PREVIOUS ENDODONTIC TREATMENT: ICD-10-CM

## 2025-02-26 PROCEDURE — WIS2001 FINAL IMPRESSION - CROWN OR IMPLANT

## 2025-02-26 PROCEDURE — WIS2000 CROWN PREP

## 2025-02-26 PROCEDURE — D2954 PREFABRICATED POST AND CORE IN ADDITION TO CROWN: HCPCS

## 2025-02-26 NOTE — PROGRESS NOTES
Post and Core #20, 21, [place] prep & impression #20, 21    Cheri Flanagan 49 y.o. female presents with self to [facility] for post and core #20, 21.  PMH reviewed, no changes, ASA II. Significant medical history: see chart. Significant allergies: NKA. Significant medications: see chart.  Pain level 0/10    Diagnosis:  Previously completed root canal therapy. Patient is free of symptoms and tooth apex consistent with normal healing.  Post indicated for retention of core.  Core indicated for restoration of lost tooth structure needed prior to crown prep.    Prognosis:  good    Consent:  Risks of specific procedure: perforation of root,.  Risks of any dental procedure: post procedural pain or sensitivity, local anesthetic side effects, allergic reaction to dental materials and medications, breakage of local anesthetic needle, aspiration of small dental tools, injury to nearby hard and soft tissues and anatomical structures.  Benefits: support for definitive restoration if pt desires to save tooth,.  Alternatives: extraction,  no tx.  Tx plan for post and core #20, 21 reviewed. Opportunity to ask questions given, all questions answered to degree of medical and dental certainty.  Patient understands and consent given by self via verbal consent.    Anesthesia:  Topical 20% benzocaine.  1 carps 4% Septocaine 1:100k epi via buccal infiltration and palatal/lingual infiltration.    Procedure details:  Preliminary stent for provisional crown/bridge created with blue bite.  #20, 21 presents with previous splinted crowns #20/21. Removed crowns with crown trever.  Isolation: DryShield  and cotton rolls.  Temporary restorative material removed and jaden percha accessed with round carbide.  Removed necessary amount of jaden percha using heated jaden percha pen.  Post space prepared using post drills for RelyX Glass Fiber Post System up to size 1.3 mm (Yellow).  Corresponding glass fiber post tried in canal.  PA radiograph to verify seating  taken.  Etch 37% H2PO4 20 seconds. Rinsed and suctioned.Dried with paper point.  Applied Multicore adhesive with 20 second scrub once, gentle air dry and light cured for 10s.  Applied Multicore Flow core buildup material to canals.  Glass fiber post placed into canal and build-up placed around core.  Post-op PA radiograph taken.  Checked and adjusted occlusion.    Tooth #20, 21 prepped with reduction for Zirconia.  Made provisional crown/bridge with provisa and matrix, refined with andrew on high speed.  Confirmed provisional covers margins with no overhangs.  Margin isolation: Packed two cords (size 00 and 0) soaked in hemodent. Removed 1 cord after 5 min for final impression, air dried. Removed other cord after successful final impression..  Final Impression made with Travolver scan.   Impression quality examined, confirmed prep(s) captured with no voids or distortions.  Cemented provisional crown/bridge using Provisa temporary cement. Removed excess cement, occlusion and contacts verified.   Selected porcelain shade: A2.     When reviewing final xrays after pt dismissed, there is caries M/D of #22. The distal is gingival resin that can be patched and shouldn't affect distal contour of crown.     Patient dismissed ambulatory and alert.    NV: Resin #[id number], deliver #20, 21 crown.    Attending: Dr. Manzanares was present in clinic.

## 2025-03-12 ENCOUNTER — TELEPHONE (OUTPATIENT)
Facility: HOSPITAL | Age: 50
End: 2025-03-12

## 2025-03-12 NOTE — TELEPHONE ENCOUNTER
Telephone Call    [] Called Patient regarding [facility] Program; Patient enrolled to [facility].    [] Called Patient regarding [facility] Program; Patient answered call and wants to enroll; Asked for a call back.    [] Called Patient regarding [facility] Program; Patient answered call; Declined to enroll in program.    [] Called Patient regarding [facility] Program; Patient did not ; Left voicemail with my name and direct phone number.     [x] Called Patient regarding [facility] Program; Patient did not ; Unable to leave voicemail.    [] Called Patient regarding [facility] Program; Phone number is invalid    Attempts (Max 3): [x]1   []2   []3        Additional Notes:

## 2025-03-19 ENCOUNTER — OFFICE VISIT (OUTPATIENT)
Dept: DENTISTRY | Facility: CLINIC | Age: 50
End: 2025-03-19

## 2025-03-19 VITALS — HEART RATE: 75 BPM | DIASTOLIC BLOOD PRESSURE: 57 MMHG | SYSTOLIC BLOOD PRESSURE: 89 MMHG

## 2025-03-19 DIAGNOSIS — K08.59 FULL RESTORATION OF CROWN OF TOOTH NEEDED DUE TO PREVIOUS LARGE RESTORATION PROCEDURE: Primary | ICD-10-CM

## 2025-03-19 DIAGNOSIS — K02.9 CARIES: ICD-10-CM

## 2025-03-19 PROCEDURE — D2740 CROWN - PORCELAIN/CERAMIC: HCPCS

## 2025-03-19 PROCEDURE — D2332 RESIN-BASED COMPOSITE - 3 SURFACES, ANTERIOR: HCPCS

## 2025-03-19 PROCEDURE — LAB02 LAB FEE CROWN

## 2025-03-19 NOTE — PROGRESS NOTES
Composite Restoration #22DFL, Dresser Delivery #20, 21    Cheri Flanagan 49 y.o. female presents with self to [facility] for composite restoration  PMH reviewed, no changes, ASA II. Significant medical history: see chart. Significant allergies: NKA. Significant medications: NSF.    Diagnosis:  Caries #22MDL  Previously endo tx #20, 21 needing full coverage crown    Prognosis:  good    Consent:  Risks of specific procedure: need for RCT if pulp exposure occurs or in future if pulp is inflamed, need to revise tx plan based on extent of decay, damage to adjacent tooth and/or restoration.  Risks of any dental procedure: post procedural pain or sensitivity, local anesthetic side effects, allergic reaction to dental materials and medications, breakage of local anesthetic needle, aspiration of small dental tools, injury to nearby hard and soft tissues and anatomical structures.  Benefits: prevent further breakdown of tooth and its sequelae.  Alternatives:  no tx.  Tx plan for composite restoration #22MDL reviewed. Opportunity to ask questions given, all questions answered to degree of medical and dental certainty.  Patient understands and consent given by self via verbal consent.    Anesthesia:  Topical 20% benzocaine.  1 carps 4% Septocaine 1:100k epi via buccal infiltration.    Procedure details:  Isolation: cotton rolls  Prepped teeth #22DFL with high speed handpiece. Kept distal old restoration & patched cervical portion because remaining margins DEJ clear of caries and had good access due to #21 being prepped.   Caries removed with round carbide on slow speed.  Band placement: mylar strip.   Etch with 37% H2PO4 15 seconds. Rinsed and suctioned.  Applied  with 20 second scrub, air dried, and light cured.  Restored with packable and flowable (A2 shade) and light cured.  Checked occlusion and adjusted with finishing burs.  Checked contacts with floss    Crown/bridge tried intraorally and checked for marginal fit,  occlusion, and contacts.  Adjusted occlusion and contacts as needed.  Verified seating with radiograph: Select PA(s) - #20, 21.  Confirmed satisfaction of fit and aesthetics with patient mirror. Pt understood this was the last opportunity to request changes if desired. Pt accepted the crown/bridge for cementation.    Isolation: cotton rolls  Treated crown/bridge intaglio with Ivoclean.  Cemented with Calibra resin cement.  Tack cured 3 sec, excess cement removed with  and high speed suction.  After 3 min, floss through contacts to remove interproximal cement.  Remaining cement removed after 5 min final set.  Re-checked occlusion and contacts.  Took final radiograph: Single PA - 20,21.      Patient dismissed ambulatory and alert.    NV: Periodic, update rebeka PA's due to recurrent caries.    Attending: Dr. Warren was present in clinic.

## 2025-03-20 ENCOUNTER — RESULTS FOLLOW-UP (OUTPATIENT)
Dept: OBGYN CLINIC | Facility: CLINIC | Age: 50
End: 2025-03-20

## 2025-03-31 ENCOUNTER — TELEPHONE (OUTPATIENT)
Facility: HOSPITAL | Age: 50
End: 2025-03-31

## 2025-03-31 NOTE — TELEPHONE ENCOUNTER
Telephone Call    [] Called Patient regarding [facility]; Patient enrolled to [facility].    [] Called Patient regarding [facility]; Patient answered call and wants to enroll; Asked for a call back.    [x] Called Patient regarding [facility]; Patient answered call; Declined to enroll in program.    [] Called Patient regarding [facility]; Patient did not ; Left voicemail with my name and direct phone number.     [] Called Patient regarding [facility]; Patient did not ; Unable to leave voicemail.    [] Called Patient regarding [facility]; Phone number is invalid    Attempts (Max 3): []1   []2   []3        Additional Notes:  Patient states her insurance coverage covers the mammogram.

## 2025-04-17 ENCOUNTER — OFFICE VISIT (OUTPATIENT)
Dept: DENTISTRY | Facility: CLINIC | Age: 50
End: 2025-04-17

## 2025-04-17 DIAGNOSIS — Z01.21 ENCOUNTER FOR DENTAL EXAMINATION AND CLEANING WITH ABNORMAL FINDINGS: Primary | ICD-10-CM

## 2025-04-17 PROCEDURE — D4910 PERIODONTAL MAINTENANCE: HCPCS

## 2025-04-17 PROCEDURE — D0220 INTRAORAL - PERIAPICAL FIRST RADIOGRAPHIC IMAGE: HCPCS

## 2025-04-17 PROCEDURE — D0120 PERIODIC ORAL EVALUATION - ESTABLISHED PATIENT: HCPCS

## 2025-04-17 NOTE — PROGRESS NOTES
PERIODIC EXAM, PERIO MAINT. 3/4 mths., 1 PA    REVIEWED MED HX: meds, allergies, health changes reviewed in [facility]. All consents signed.  CHIEF CONCERN: no dental pain or concerns  PAIN SCALE:  0  ASA CLASS:  II  PLAQUE:  moderate  CALCULUS:  moderate  BLEEDING:   moderate  STAIN :   moderate      PERIO: 4B    Hygiene Procedures:  Scaled, Polished, Flossed and Used Cavitron    Oral Hygiene Instruction: Brushing Minimum 2x daily for 2 minutes, daily flossing, Water pik, Interproximal Brush, OTC Fluoride rinse, and Electric toothbrush    Dispensed: Toothbrush, Toothpaste, Floss    Visual and Tactile Intraoral/ Extraoral evaluation: Oral and Oropharyngeal cancer evaluation. No findings     Dr. Rodriguez   Reviewed with patient clinical and radiographic findings and patient verbalized understanding. All questions and concerns addressed.   Took PA of #8 and 9 to continue to monitor apex from apicos.    REFERRALS: none    CARIES FINDINGS: #27-MBL       TREATMENT  PLAN :   NV: #27-MBL

## 2025-06-24 ENCOUNTER — OFFICE VISIT (OUTPATIENT)
Dept: DENTISTRY | Facility: CLINIC | Age: 50
End: 2025-06-24

## 2025-06-24 VITALS — HEART RATE: 84 BPM | DIASTOLIC BLOOD PRESSURE: 73 MMHG | SYSTOLIC BLOOD PRESSURE: 113 MMHG

## 2025-06-24 DIAGNOSIS — K02.9 CARIES: Primary | ICD-10-CM

## 2025-06-24 PROCEDURE — D2332 RESIN-BASED COMPOSITE - 3 SURFACES, ANTERIOR: HCPCS

## 2025-06-24 NOTE — PROGRESS NOTES
Composite Restoration #27    Cheri Flanagan 50 y.o. female presents with self to [facility] for composite restoration  PMH reviewed, no changes, ASA II. Significant medical history: reviewed. Significant allergies: reviewed. Significant medications: reviewed.    Diagnosis:  Caries #27    Prognosis:  good    Consent:  Risks of specific procedure: need for RCT if pulp exposure occurs or in future if pulp is inflamed, need to revise tx plan based on extent of decay, damage to adjacent tooth and/or restoration, .  Risks of any dental procedure: post procedural pain or sensitivity, local anesthetic side effects, allergic reaction to dental materials and medications, breakage of local anesthetic needle, aspiration of small dental tools, injury to nearby hard and soft tissues and anatomical structures.  Benefits: prevent further breakdown of tooth and its sequelae, .  Alternatives: 2nd op, no tx.  Tx plan for composite restoration #27 reviewed. Opportunity to ask questions given, all questions answered to degree of medical and dental certainty.  Patient understands and consent given by self via verbal consent.    Anesthesia:  Topical 20% benzocaine.  1 carps 2% Lidocaine 1:100k epi via buccal infiltration and palatal/lingual infiltration.  1 carps 4% Septocaine 1:100k epi via buccal infiltration and palatal/lingual infiltration.    Procedure details:  Isolation: cotton rolls and high volume suction  Prepped teeth #27 with high speed handpiece.  Caries removed with round carbide on slow speed.  Band placement: mylar strip and wedge.   Etch with 37% H2PO4 15 seconds. Rinsed and suctioned.  Applied  with 20 second scrub, air dried, and light cured.  Restored with packable and flowable (A2 shade) and light cured.  Checked occlusion and adjusted with finishing burs.  Checked contacts with floss  Polished with enhance point.  Verified occlusion and contacts.    Patient dismissed ambulatory and alert.    NOTE: Tooth  malposed with 26 and 28. Cervical thinner; recommend full coverage crown in future.    NV: 6mrc // perio maintenance 3mrc.    Attending: Dr. Hoffman was present in clinic.

## 2025-08-19 ENCOUNTER — TELEPHONE (OUTPATIENT)
Dept: DENTISTRY | Facility: CLINIC | Age: 50
End: 2025-08-19